# Patient Record
Sex: FEMALE | Race: WHITE | NOT HISPANIC OR LATINO | Employment: OTHER | URBAN - NONMETROPOLITAN AREA
[De-identification: names, ages, dates, MRNs, and addresses within clinical notes are randomized per-mention and may not be internally consistent; named-entity substitution may affect disease eponyms.]

---

## 2017-02-17 ENCOUNTER — TELEPHONE (OUTPATIENT)
Dept: ALLERGY | Facility: OTHER | Age: 52
End: 2017-02-17

## 2017-02-17 DIAGNOSIS — J30.89 PERENNIAL ALLERGIC RHINITIS: ICD-10-CM

## 2017-03-08 ENCOUNTER — TELEPHONE (OUTPATIENT)
Dept: ALLERGY | Facility: OTHER | Age: 52
End: 2017-03-08

## 2017-03-08 NOTE — TELEPHONE ENCOUNTER
Patient calls requesting refill of Sublingual immunotherapy drops. Patient is up to date with follow ups.  [facility] pending approval of [facility]. Drops will be sent to patients home via USPS.     Jasmyne Maldonado RN-BSN

## 2017-03-22 ENCOUNTER — OFFICE VISIT (OUTPATIENT)
Dept: FAMILY MEDICINE | Facility: OTHER | Age: 52
End: 2017-03-22
Attending: FAMILY MEDICINE
Payer: COMMERCIAL

## 2017-03-22 VITALS
DIASTOLIC BLOOD PRESSURE: 64 MMHG | TEMPERATURE: 97.7 F | BODY MASS INDEX: 25.16 KG/M2 | WEIGHT: 166 LBS | SYSTOLIC BLOOD PRESSURE: 96 MMHG | OXYGEN SATURATION: 100 % | HEIGHT: 68 IN | RESPIRATION RATE: 16 BRPM | HEART RATE: 67 BPM

## 2017-03-22 DIAGNOSIS — L72.9 SKIN CYST: ICD-10-CM

## 2017-03-22 DIAGNOSIS — J01.00 ACUTE MAXILLARY SINUSITIS, RECURRENCE NOT SPECIFIED: Primary | ICD-10-CM

## 2017-03-22 PROCEDURE — 99213 OFFICE O/P EST LOW 20 MIN: CPT | Performed by: FAMILY MEDICINE

## 2017-03-22 ASSESSMENT — PAIN SCALES - GENERAL: PAINLEVEL: SEVERE PAIN (6)

## 2017-03-22 NOTE — PROGRESS NOTES
Luly Freeman    2017    Chief Complaint   Patient presents with     Sinus Problem     Pt has left sinus pain and ear pain. Pt has had a cold since January. Pt does not have dental pain. Pt has tried a mouthgaurd without success. Pt has PND which causes ST.     Mass     Pt has a lump in her right calf for 10 weeks. The lump has increased in size. Lump is painful with touch.       SUBJECTIVE:  Here for ongoing sinus sx.  Having some facial pain.  Has had many issues with allergies and sinuses.  Sx since January.  Affecting left side of face.  Also, lump in right calf for aobut 2 months or more.  It has grown.  It is slightly tender.   No redness.  No excess swelling.  Has known varicosities and spider veins.      Past Medical History:   Diagnosis Date     Allergic rhinitis 2011     Lump or mass in breast 2007     Recurrent sinusitis 2011     Varicose veins 2011       Past Surgical History:   Procedure Laterality Date     BUNIONECTOMY RT/LT  2008     ENT SURGERY  10/2013    nose surgery, Dr. Elliott            laparoscopic supracervical hysterectomy      Fibroids, ovaries intact     Left Breast Bx  3/2007    Fibrocystic breast disease     PE tube placement       ventilation tube, left         Current Outpatient Prescriptions   Medication Sig Dispense Refill     amoxicillin-clavulanate (AUGMENTIN) 875-125 MG per tablet Take 1 tablet by mouth 2 times daily 20 tablet 0     SUBLINGUAL IMMUNOTHERAPY, SLIT, Continue SLIT, 1 drop 3 x day (SL), following standard maintenance protocol. 1 Bottle 3     fluticasone (FLONASE) 50 MCG/ACT nasal spray Spray 2 sprays into both nostrils daily       ALBUTEROL 108 (90 BASE) MCG/ACT inhaler INHALE 2 PUFFS BY MOUTH EVERY 6 HOURS AS NEEDED FOR SHORTNESS OF BREATH 8.5 g 0     EPINEPHrine (AUVI-Q) 0.3 MG/0.3ML injection Inject 0.3 mLs into the muscle once as needed for anaphylaxis for 1 dose. 2 each 2     fexofenadine (ALLEGRA) 180 MG  tablet Take 1 tablet by mouth every morning. 30 tablet 11       No Known Allergies    Family History   Problem Relation Age of Onset     C.A.D. Father      DIABETES No family hx of      CEREBROVASCULAR DISEASE No family hx of        Social History     Social History     Marital status:      Spouse name: N/A     Number of children: N/A     Years of education: N/A     Occupational History     cosmotologist      Full-time     Social History Main Topics     Smoking status: Never Smoker     Smokeless tobacco: Never Used      Comment: no passive exposure     Alcohol use Yes      Comment: rarely     Drug use: No     Sexual activity: Not on file     Other Topics Concern     Caffeine Concern Yes     coffee, 1 cup daily     Parent/Sibling W/ Cabg, Mi Or Angioplasty Before 65f 55m? No     Social History Narrative       5 point ROS negative except as noted above in HPI, including Gen., Resp., CV, GI &  system review.     OBJECTIVE:  B/P: 96/64, T: 97.7, P: 67, R: 16    GENERAL APPEARANCE: Alert, no acute distress  HEENT: probably some serous otitis, minimal.  Nothing infected.  Normal throat.    CV: regular rate and rhythm, no murmur, rub or gallop  RESP: lungs clear to auscultation bilaterally  ABDOMEN: normal bowel sounds, soft, nontender, no hepatosplenomegaly or other masses  SKIN: no suspicious lesions or rashes to visualized skin.  Probable cyst in the skin mid calf on the right.  Some spider veins around the area.    NEURO: Alert, oriented x 3, speech and mentation normal    ASSESSMENT and PLAN:  (J01.00) Acute maxillary sinusitis, recurrence not specified  (primary encounter diagnosis)  Comment: discussed.    Plan: amoxicillin-clavulanate (AUGMENTIN) 875-125 MG         per tablet        I think this is likely but I am not sure and she knows it.  It is worth a try.  If the abx fail she is going to f/u with Dr. Matos, whom she has seen many times for similar.     (L72.9) Skin cyst  Comment: right calf  area  Plan: GENERAL SURG ADULT REFERRAL        Discussed.  She would like removed.  I am not 100% sure it's a cyst, so excision certainly appropriate.

## 2017-03-22 NOTE — MR AVS SNAPSHOT
After Visit Summary   3/22/2017    Luly Freeman    MRN: [id number]           Patient Information     Date Of Birth          1965        Visit Information        Provider Department      3/22/2017 11:15 AM Zackary Smyth MD [facility]        Today's Diagnoses     Acute maxillary sinusitis, recurrence not specified    -  1    Skin cyst          Care Instructions    F/u with ongoing concerns.         Follow-ups after your visit        Additional Services     GENERAL SURG ADULT REFERRAL       Your provider has referred you to: Dr. Jenkins    Please be aware that coverage of these services is subject to the terms and limitations of your health insurance plan.  Call member services at your health plan with any benefit or coverage questions.      Please bring the following with you to your appointment:    (1) Any X-Rays, CTs or MRIs which have been performed.  Contact the facility where they were done to arrange for  prior to your scheduled appointment.   (2) List of current medications   (3) This referral request   (4) Any documents/labs given to you for this referral                  Your next 10 appointments already scheduled     Apr 19, 2017  9:30 AM CDT   (Arrive by 9:15 AM)   New Visit with Erma Jenkins MD   [facility]    [address] [place] [address] [facility] [address]   [phone]              Who to contact     If you have questions or need follow up information about today's clinic visit or your schedule please contact [facility] directly at [phone].  Normal or non-critical lab and imaging results will be communicated to you by MyChart, letter or phone within 4 business days after the clinic has received the results. If you do not hear from us within 7 days, please contact the clinic through MyChart or phone. If you have a critical or abnormal lab result, we will notify you by phone as soon as possible.  Submit refill  "requests through Innovega or call your pharmacy and they will forward the refill request to us. Please allow 3 business days for your refill to be completed.          Additional Information About Your Visit        Satori Brandshart Information     In[employer]ovega gives you secure access to your electronic health record. If you see a primary care provider, you can also send messages to your care team and make appointments. If you have questions, please call your primary care clinic.  If you do not have a primary care provider, please call [phone] and they will assist you.        Care EveryWhere ID     This is your Care EveryWhere ID. This could be used by other organizations to access your [facility] medical records  [id number]        Your Vitals Were     Pulse Temperature Respirations Height Pulse Oximetry BMI (Body Mass Index)    67 97.7  F (36.5  C) (Tympanic) 16 5' 8\" (1.727 m) 100% 25.24 kg/m2       Blood Pressure from Last 3 Encounters:   03/22/17 96/64   11/02/16 112/74   10/19/16 96/62    Weight from Last 3 Encounters:   03/22/17 166 lb (75.3 kg)   11/02/16 160 lb (72.6 kg)   10/19/16 155 lb (70.3 kg)              We Performed the Following     GENERAL SURG ADULT REFERRAL          Today's Medication Changes          These changes are accurate as of: 3/22/17 11:49 AM.  If you have any questions, ask your nurse or doctor.               Start taking these medicines.        Dose/Directions    amoxicillin-clavulanate 875-125 MG per tablet   Commonly known as:  AUGMENTIN   Used for:  Acute maxillary sinusitis, recurrence not specified   Started by:  Zackary Smyth MD        Dose:  1 tablet   Take 1 tablet by mouth 2 times daily   Quantity:  20 tablet   Refills:  0            Where to get your medicines      These medications were sent to [id number] - [place] - [address] AT [facility] [address] [place] [address]     Phone:  [phone]     amoxicillin-clavulanate 875-125 MG per " tablet                Primary Care Provider Office Phone # Fax #    Tere Herrera [phone]       [facility] [place] [address]  [place] [address]        Thank you!     Thank you for choosing [facility]  for your care. Our goal is always to provide you with excellent care. Hearing back from our patients is one way we can continue to improve our services. Please take a few minutes to complete the written survey that you may receive in the mail after your visit with us. Thank you!             Your Updated Medication List - Protect others around you: Learn how to safely use, store and throw away your medicines at www.disposemymeds.org.          This list is accurate as of: 3/22/17 11:49 AM.  Always use your most recent med list.                   Brand Name Dispense Instructions for use    albuterol 108 (90 BASE) MCG/ACT Inhaler   Generic drug:  albuterol     8.5 g    INHALE 2 PUFFS BY MOUTH EVERY 6 HOURS AS NEEDED FOR SHORTNESS OF BREATH       amoxicillin-clavulanate 875-125 MG per tablet    AUGMENTIN    20 tablet    Take 1 tablet by mouth 2 times daily       EPINEPHrine 0.3 MG/0.3ML injection    AUVI-Q    2 each    Inject 0.3 mLs into the muscle once as needed for anaphylaxis for 1 dose.       fexofenadine 180 MG tablet    ALLEGRA    30 tablet    Take 1 tablet by mouth every morning.       fluticasone 50 MCG/ACT spray    FLONASE     Spray 2 sprays into both nostrils daily       SUBLINGUAL IMMUNOTHERAPY (SLIT)     1 Bottle    Continue SLIT, 1 drop 3 x day (SL), following standard maintenance protocol.

## 2017-03-22 NOTE — NURSING NOTE
"Chief Complaint   Patient presents with     Sinus Problem     Pt has left sinus pain and ear pain. Pt has had a cold since January. Pt does not have dental pain. Pt has tried a mouthgaurd without success. Pt has PND which causes ST.     Mass     Pt has a lump in her right calf for 10 weeks. The lump has increased in size. Lump is painful with touch.       Initial BP 96/64 (BP Location: Right arm, Patient Position: Chair, Cuff Size: Adult Regular)  Pulse 67  Temp 97.7  F (36.5  C) (Tympanic)  Resp 16  Ht 5' 8\" (1.727 m)  Wt 166 lb (75.3 kg)  SpO2 100%  BMI 25.24 kg/m2 Estimated body mass index is 25.24 kg/(m^2) as calculated from the following:    Height as of this encounter: 5' 8\" (1.727 m).    Weight as of this encounter: 166 lb (75.3 kg).  Medication Reconciliation: complete   Danielle Hatfield    "

## 2017-04-19 ENCOUNTER — OFFICE VISIT (OUTPATIENT)
Dept: SURGERY | Facility: OTHER | Age: 52
End: 2017-04-19
Attending: FAMILY MEDICINE
Payer: COMMERCIAL

## 2017-04-19 ENCOUNTER — TELEPHONE (OUTPATIENT)
Dept: FAMILY MEDICINE | Facility: OTHER | Age: 52
End: 2017-04-19

## 2017-04-19 VITALS
HEIGHT: 68 IN | SYSTOLIC BLOOD PRESSURE: 98 MMHG | TEMPERATURE: 97.5 F | DIASTOLIC BLOOD PRESSURE: 66 MMHG | HEART RATE: 80 BPM | BODY MASS INDEX: 24.25 KG/M2 | WEIGHT: 160 LBS | OXYGEN SATURATION: 100 %

## 2017-04-19 DIAGNOSIS — L72.9 SKIN CYST: ICD-10-CM

## 2017-04-19 PROCEDURE — 99244 OFF/OP CNSLTJ NEW/EST MOD 40: CPT | Performed by: SURGERY

## 2017-04-19 NOTE — MR AVS SNAPSHOT
After Visit Summary   4/19/2017    Luly Freeman    MRN: [id number]           Patient Information     Date Of Birth          1965        Visit Information        Provider Department      4/19/2017 9:30 AM Erma Jenkins MD [facility]        Today's Diagnoses     Skin cyst          Care Instructions    Thank you for allowing Dr. Jenkins and our surgical team to participate in your care.  If you have a scheduling or an appointment question please contact our Health Unit Coordinator, Margaret,  at her direct line [phone].   ALL nursing questions or concerns can be directed to your surgical nurse at: [phone] -Yamilet    You are scheduled for a:   Excision soft tissue lesion, right lower leg.  Your procedure date is:   Monday, May 22, 2017    We will update our paper work the morning of the procedure - an additional appointment in this office is not required.      Nothing to eat or drink after midnight the evening prior to excision.      HOW TO PREPARE-    You need to have a scheduled Pre-Op with your primary care physician 10 days of the procedure.      You need a friend or family member available to drive you home AND stay with you for 24 hours after you leave the hospital. You will not be allowed to drive yourself. IF you need to take a taxi or the bus you MUST have a responsible person to ride with you. YOUR PROCEDURE WILL BE CANCELLED IF YOU DO NOT HAVE A RESPONSIBLE ADULT TO DRIVE YOU HOME.       You CANNOT have anything to eat or drink after midnight the night before your surgery, ncluding water and coffee. Your stomach needs to be completely empty. Do NOT chew gum, suck on hard candy, or smoke. You can brush your teeth the morning of surgery.       You need to call our Surgery Education Nurses 1-2 weeks prior to your surgery date at  [phone] or toll free [phone]. Please have you medication and allergy lists ready.      Stop your aspirin or other  NSAIDs(Ibuprofen, Motrin, Aleve, Celebrex, Naproxen, etc...) 7 days before your surgery.      Hospital admitting will call you the day before your surgery with your arrival time. If you are scheduled on a Monday admitting will call you the Friday before.      Please call your primary care physician if you should become ill within 24 hours of scheduled surgery. (ex.vomiting, diarrhea, fever)          You will need to wash the night before AND the morning of you procedure with the supplied Hibiclens. Wash your Surgical area with your bare hands, apply friction and rinse. KEEP IT AWAY FROM YOUR EYES, EARS, NOSE AND MOUTH.             Follow-ups after your visit        Who to contact     If you have questions or need follow up information about today's clinic visit or your schedule please contact [facility] directly at [phone].  Normal or non-critical lab and imaging results will be communicated to you by Atticoushart, letter or phone within 4 business days after the clinic has received the results. If you do not hear from us within 7 days, please contact the clinic through Atticoushart or phone. If you have a critical or abnormal lab result, we will notify you by phone as soon as possible.  Submit refill requests through eTukTuk or call your pharmacy and they will forward the refill request to us. Please allow 3 business days for your refill to be completed.          Additional Information About Your Visit        eTukTuk Information     eTukTuk gives you secure access to your electronic health record. If you see a primary care provider, you can also send messages to your care team and make appointments. If you have questions, please call your primary care clinic.  If you do not have a primary care provider, please call [phone] and they will assist you.        Care EveryWhere ID     This is your Care EveryWhere ID. This could be used by other organizations to access your [facility]  "records  [id number]        Your Vitals Were     Pulse Temperature Height Pulse Oximetry BMI (Body Mass Index)       80 97.5  F (36.4  C) (Tympanic) 5' 8\" (1.727 m) 100% 24.33 kg/m2        Blood Pressure from Last 3 Encounters:   04/19/17 98/66   03/22/17 96/64   11/02/16 112/74    Weight from Last 3 Encounters:   04/19/17 160 lb (72.6 kg)   03/22/17 166 lb (75.3 kg)   11/02/16 160 lb (72.6 kg)              Today, you had the following     No orders found for display       Primary Care Provider Office Phone # Fax #    Tere Herrera [phone]       [facility] [place] [address] [facility] [address]  [place] [address]        Thank you!     Thank you for choosing [facility]  for your care. Our goal is always to provide you with excellent care. Hearing back from our patients is one way we can continue to improve our services. Please take a few minutes to complete the written survey that you may receive in the mail after your visit with us. Thank you!             Your Updated Medication List - Protect others around you: Learn how to safely use, store and throw away your medicines at www.disposemymeds.org.          This list is accurate as of: 4/19/17  9:52 AM.  Always use your most recent med list.                   Brand Name Dispense Instructions for use    albuterol 108 (90 BASE) MCG/ACT Inhaler   Generic drug:  albuterol     8.5 g    INHALE 2 PUFFS BY MOUTH EVERY 6 HOURS AS NEEDED FOR SHORTNESS OF BREATH       EPINEPHrine 0.3 MG/0.3ML injection    AUVI-Q    2 each    Inject 0.3 mLs into the muscle once as needed for anaphylaxis for 1 dose.       fexofenadine 180 MG tablet    ALLEGRA    30 tablet    Take 1 tablet by mouth every morning.       fluticasone 50 MCG/ACT spray    FLONASE     Spray 2 sprays into both nostrils daily       SUBLINGUAL IMMUNOTHERAPY (SLIT)     1 Bottle    Continue SLIT, 1 drop 3 x day (SL), following standard maintenance protocol.         "

## 2017-04-19 NOTE — NURSING NOTE
"Chief Complaint   Patient presents with     Consult     Lump right calf X10 weeks, increasing in size and painful to touch       Initial BP 98/66 (BP Location: Left arm, Cuff Size: Adult Regular)  Pulse 80  Temp 97.5  F (36.4  C) (Tympanic)  Ht 5' 8\" (1.727 m)  Wt 160 lb (72.6 kg)  SpO2 100%  BMI 24.33 kg/m2 Estimated body mass index is 24.33 kg/(m^2) as calculated from the following:    Height as of this encounter: 5' 8\" (1.727 m).    Weight as of this encounter: 160 lb (72.6 kg).  Medication Reconciliation: complete   Ara Souza LPN      "

## 2017-04-19 NOTE — PATIENT INSTRUCTIONS
Thank you for allowing Dr. Jenkins and our surgical team to participate in your care.  If you have a scheduling or an appointment question please contact our Health Unit Coordinator, Margaret,  at her direct line [phone].   ALL nursing questions or concerns can be directed to your surgical nurse at: [phone] -Yamilet    You are scheduled for a:   Excision soft tissue lesion, right lower leg.  Your procedure date is:   Monday, May 22, 2017    We will update our paper work the morning of the procedure - an additional appointment in this office is not required.      Nothing to eat or drink after midnight the evening prior to excision.      HOW TO PREPARE-    You need to have a scheduled Pre-Op with your primary care physician 10 days of the procedure.      You need a friend or family member available to drive you home AND stay with you for 24 hours after you leave the hospital. You will not be allowed to drive yourself. IF you need to take a taxi or the bus you MUST have a responsible person to ride with you. YOUR PROCEDURE WILL BE CANCELLED IF YOU DO NOT HAVE A RESPONSIBLE ADULT TO DRIVE YOU HOME.       You CANNOT have anything to eat or drink after midnight the night before your surgery, ncluding water and coffee. Your stomach needs to be completely empty. Do NOT chew gum, suck on hard candy, or smoke. You can brush your teeth the morning of surgery.       You need to call our Surgery Education Nurses 1-2 weeks prior to your surgery date at  [phone] or toll free [phone]. Please have you medication and allergy lists ready.      Stop your aspirin or other NSAIDs(Ibuprofen, Motrin, Aleve, Celebrex, Naproxen, etc...) 7 days before your surgery.      Hospital admitting will call you the day before your surgery with your arrival time. If you are scheduled on a Monday admitting will call you the Friday before.      Please call your primary care physician if you should become ill within 24 hours of scheduled  surgery. (ex.vomiting, diarrhea, fever)          You will need to wash the night before AND the morning of you procedure with the supplied Hibiclens. Wash your Surgical area with your bare hands, apply friction and rinse. KEEP IT AWAY FROM YOUR EYES, EARS, NOSE AND MOUTH.

## 2017-04-19 NOTE — TELEPHONE ENCOUNTER
Pt calling stating that she has a pre op on 05/15/17 and wondering if she could also have her physical at the same time including her pap and breast exam. Please call the pt to let her know if this is possible and ok to leave a message on phone.

## 2017-04-19 NOTE — PROGRESS NOTES
"[facility] Consultation    CC:  Soft tissue mass, right lower leg    HPI:  This 51 year old year old female is seen at the request of Dr. Smyth and ultimately Dr. Herrera for evaluation of an enlarging soft tissue lesion, right lower leg.  The patient noted a \"pea sized\" lesion under the skin on her right lower leg while shaving.  Her massage therapist monitored and she had it examined by Dr. Smyth while being evaluated for a sinus infection.  Definitive excision requested as this has enlarged to now 2.5 - 3.0 cm diameter.  She has superficial spider varicosities.  Excision will require main operating room.  The process was first noted 10 weeks ago with the enlargement described.    Past Medical History:   Diagnosis Date     Allergic rhinitis 2011     Lump or mass in breast 2007     Recurrent sinusitis 2011     Varicose veins 2011     Past Surgical History:   Procedure Laterality Date     BUNIONECTOMY RT/LT  2008     ENT SURGERY  10/2013    nose surgery, Dr. Elliott            laparoscopic supracervical hysterectomy      Fibroids, ovaries intact     Left Breast Bx  3/2007    Fibrocystic breast disease     PE tube placement       ventilation tube, left       Current Outpatient Prescriptions   Medication     SUBLINGUAL IMMUNOTHERAPY, SLIT,     fluticasone (FLONASE) 50 MCG/ACT nasal spray     ALBUTEROL 108 (90 BASE) MCG/ACT inhaler     EPINEPHrine (AUVI-Q) 0.3 MG/0.3ML injection     fexofenadine (ALLEGRA) 180 MG tablet     No current facility-administered medications for this visit.      Allergies   Allergen Reactions     Seasonal Allergies      HABITS:    Social History   Substance Use Topics     Smoking status: Never Smoker     Smokeless tobacco: Never Used      Comment: no passive exposure     Alcohol use Yes      Comment: rarely       Family History   Problem Relation Age of Onset     C.A.D. Father      DIABETES No family hx of      CEREBROVASCULAR " "DISEASE No family hx of        REVIEW OF SYSTEMS:  Ten point review of systems negative except those mentioned in the HPI.     OBJECTIVE:    BP 98/66 (BP Location: Left arm, Cuff Size: Adult Regular)  Pulse 80  Temp 97.5  F (36.4  C) (Tympanic)  Ht 5' 8\" (1.727 m)  Wt 160 lb (72.6 kg)  SpO2 100%  BMI 24.33 kg/m2    GENERAL: Generally appears well, in no distress with appropriate affect.  HEENT:   Sclerae anicteric - No cervical, supra/infraclavciular lymphadenopathy, no thyroid masses  Respiratory:  Lungs clear to ausculation bilaterally with good air excursion  Cardiovascular:  Regular Rate and Rhythm with no murmurs gallops or rubs, normal   Abdomen:  :  deferred  Extremities:  Right lower leg - mid portion laterally, subcutaneous ill defined, infiltrative fibrotic, nontender lesion of uncertain etiology.  2.5 x 3.0 cm and may extend to fascia.  Skin:  no suspicious lesions or rashes; spider veins scattered about lower extremity superficially.  Neurological: grossly intact    Psych:  Alert, oriented, affect appropriate with normal decision making ability.    IMPRESSION:  Soft tissue lesion right lower leg, uncertain etiology with spider varicosities prohibiting safe office procedure (as well as firm, infiltrative, uncertain underlying process) requiring operative excision.    PLAN:  Scheduled, main operating room, local sedation.  Medical decision making exhaustively reviewed as well as approach, cosmetic and recuperative expectations.  She is entirely in agreement with plan.  Preoperative evaluation to be scheduled.    Erma Jenkins MD, FACS    4/19/2017  9:42 AM    cc:  Dr. Herrera  "

## 2017-05-04 NOTE — TELEPHONE ENCOUNTER
Called and left message to schedule an appt for a physical separate. Unable to do the preop and physical together

## 2017-05-04 NOTE — TELEPHONE ENCOUNTER
We have to do 2 separate computer templates each addressing different issues one for surgery and one for health care for this so would need to make 2 appointments

## 2017-05-15 ENCOUNTER — OFFICE VISIT (OUTPATIENT)
Dept: FAMILY MEDICINE | Facility: OTHER | Age: 52
End: 2017-05-15
Attending: FAMILY MEDICINE
Payer: COMMERCIAL

## 2017-05-15 VITALS
OXYGEN SATURATION: 100 % | TEMPERATURE: 97.5 F | HEART RATE: 77 BPM | BODY MASS INDEX: 24.33 KG/M2 | DIASTOLIC BLOOD PRESSURE: 70 MMHG | WEIGHT: 160 LBS | SYSTOLIC BLOOD PRESSURE: 102 MMHG

## 2017-05-15 DIAGNOSIS — Z01.818 PREOP GENERAL PHYSICAL EXAM: Primary | ICD-10-CM

## 2017-05-15 DIAGNOSIS — Z12.31 VISIT FOR SCREENING MAMMOGRAM: Primary | ICD-10-CM

## 2017-05-15 LAB
ERYTHROCYTE [DISTWIDTH] IN BLOOD BY AUTOMATED COUNT: 12.2 % (ref 10–15)
HCT VFR BLD AUTO: 38 % (ref 35–47)
HGB BLD-MCNC: 12.9 G/DL (ref 11.7–15.7)
MCH RBC QN AUTO: 31.3 PG (ref 26.5–33)
MCHC RBC AUTO-ENTMCNC: 33.9 G/DL (ref 31.5–36.5)
MCV RBC AUTO: 92 FL (ref 78–100)
PLATELET # BLD AUTO: 225 10E9/L (ref 150–450)
RBC # BLD AUTO: 4.12 10E12/L (ref 3.8–5.2)
WBC # BLD AUTO: 8.6 10E9/L (ref 4–11)

## 2017-05-15 PROCEDURE — 85027 COMPLETE CBC AUTOMATED: CPT | Performed by: FAMILY MEDICINE

## 2017-05-15 PROCEDURE — 99214 OFFICE O/P EST MOD 30 MIN: CPT | Performed by: FAMILY MEDICINE

## 2017-05-15 PROCEDURE — 36415 COLL VENOUS BLD VENIPUNCTURE: CPT | Performed by: FAMILY MEDICINE

## 2017-05-15 NOTE — MR AVS SNAPSHOT
After Visit Summary   5/15/2017    Luly Freeman    MRN: [id number]           Patient Information     Date Of Birth          1965        Visit Information        Provider Department      5/15/2017 11:00 AM Tere Herrera MD [facility]        Today's Diagnoses     Preop general physical exam    -  1      Care Instructions      Before Your Surgery      Call your surgeon if there is any change in your health. This includes signs of a cold or flu (such as a sore throat, runny nose, cough, rash or fever).    Do not smoke, drink alcohol or take over the counter medicine (unless your surgeon or primary care doctor tells you to) for the 24 hours before and after surgery.    If you take prescribed drugs: Follow your doctor s orders about which medicines to take and which to stop until after surgery.    Eating and drinking prior to surgery: follow the instructions from your surgeon    Take a shower or bath the night before surgery. Use the soap your surgeon gave you to gently clean your skin. If you do not have soap from your surgeon, use your regular soap. Do not shave or scrub the surgery site.  Wear clean pajamas and have clean sheets on your bed.     Hold all medications the morning of surgery    Nothing to eat or drink after midnight the night prior to surgery        Follow-ups after your visit        Your next 10 appointments already scheduled     May 22, 2017   Procedure with Erma Jenkins MD   [facility] ([address] [id number] [phone]              Who to contact     If you have questions or need follow up information about today's clinic visit or your schedule please contact [facility] directly at [phone].  Normal or non-critical lab and imaging results will be communicated to you by MyChart, letter or phone within 4 business days after the clinic has received the results. If you do not hear from us  within 7 days, please contact the clinic through [employer] or phone. If you have a critical or abnormal lab result, we will notify you by phone as soon as possible.  Submit refill requests through [employer] or call your pharmacy and they will forward the refill request to us. Please allow 3 business days for your refill to be completed.          Additional Information About Your Visit        [employer] Information     [employer] gives you secure access to your electronic health record. If you see a primary care provider, you can also send messages to your care team and make appointments. If you have questions, please call your primary care clinic.  If you do not have a primary care provider, please call [phone] and they will assist you.        Care EveryWhere ID     This is your Care EveryWhere ID. This could be used by other organizations to access your [facility] medical records  [id number]        Your Vitals Were     Pulse Temperature Pulse Oximetry BMI (Body Mass Index)          77 97.5  F (36.4  C) (Tympanic) 100% 24.33 kg/m2         Blood Pressure from Last 3 Encounters:   05/15/17 102/70   04/19/17 98/66   03/22/17 96/64    Weight from Last 3 Encounters:   05/15/17 160 lb (72.6 kg)   04/19/17 160 lb (72.6 kg)   03/22/17 166 lb (75.3 kg)              Today, you had the following     No orders found for display       Primary Care Provider Office Phone # Fax #    Tere Herrera [phone]       [facility] [place] [address] [place] [id number]        Thank you!     Thank you for choosing [facility]  for your care. Our goal is always to provide you with excellent care. Hearing back from our patients is one way we can continue to improve our services. Please take a few minutes to complete the written survey that you may receive in the mail after your visit with us. Thank you!             Your Updated Medication List - Protect others around you: Learn how to safely use, store and  throw away your medicines at www.disposemymeds.org.          This list is accurate as of: 5/15/17 11:28 AM.  Always use your most recent med list.                   Brand Name Dispense Instructions for use    albuterol 108 (90 BASE) MCG/ACT Inhaler   Generic drug:  albuterol     8.5 g    INHALE 2 PUFFS BY MOUTH EVERY 6 HOURS AS NEEDED FOR SHORTNESS OF BREATH       EPINEPHrine 0.3 MG/0.3ML injection    AUVI-Q    2 each    Inject 0.3 mLs into the muscle once as needed for anaphylaxis for 1 dose.       fexofenadine 180 MG tablet    ALLEGRA    30 tablet    Take 1 tablet by mouth every morning.       fluticasone 50 MCG/ACT spray    FLONASE     Spray 2 sprays into both nostrils daily       SUBLINGUAL IMMUNOTHERAPY (SLIT)     1 Bottle    Continue SLIT, 1 drop 3 x day (SL), following standard maintenance protocol.

## 2017-05-15 NOTE — PATIENT INSTRUCTIONS
Before Your Surgery      Call your surgeon if there is any change in your health. This includes signs of a cold or flu (such as a sore throat, runny nose, cough, rash or fever).    Do not smoke, drink alcohol or take over the counter medicine (unless your surgeon or primary care doctor tells you to) for the 24 hours before and after surgery.    If you take prescribed drugs: Follow your doctor s orders about which medicines to take and which to stop until after surgery.    Eating and drinking prior to surgery: follow the instructions from your surgeon    Take a shower or bath the night before surgery. Use the soap your surgeon gave you to gently clean your skin. If you do not have soap from your surgeon, use your regular soap. Do not shave or scrub the surgery site.  Wear clean pajamas and have clean sheets on your bed.     Hold all medications the morning of surgery    Nothing to eat or drink after midnight the night prior to surgery

## 2017-05-15 NOTE — PROGRESS NOTES
[facility] [place]  [address]  [place] [address]  [phone]  Dept: [phone]    PRE-OP EVALUATION:  Today's date: 5/15/2017    Luly Freeman (: 1965) presents for pre-operative evaluation assessment as requested by Dr. Jenkins.  She requires evaluation and anesthesia risk assessment prior to undergoing surgery/procedure for treatment of mass LE .  Proposed procedure: excise lower mass Rt leg    Date of Surgery/ Procedure: 17  Time of Surgery/ Procedure: [facility]  [facility]  Primary Physician: Tere Herrera  Type of Anesthesia Anticipated: to be determined    Patient has a Health Care Directive or Living Will:  NO    Preop Questions 2017   1.  Do you have a history of heart attack, stroke, stent, bypass or surgery on an artery in the head, neck, heart or legs? No   2.  Do you ever have any pain or discomfort in your chest? No   3.  Do you have a history of  Heart Failure? No   4.   Are you troubled by shortness of breath when:  walking on a level surface, or up a slight hill, or at night? No   5.  Do you currently have a cold, bronchitis or other respiratory infection? No   6.  Do you have a cough, shortness of breath, or wheezing? No   7.  Do you sometimes get pains in the calves of your legs when you walk? No   8. Do you or anyone in your family have previous history of blood clots? No   9.  Do you or does anyone in your family have a serious bleeding problem such as prolonged bleeding following surgeries or cuts? No   10. Have you ever had problems with anemia or been told to take iron pills? No   11. Have you had any abnormal blood loss such as black, tarry or bloody stools, or abnormal vaginal bleeding? No   12. Have you ever had a blood transfusion? No   13. Have you or any of your relatives ever had problems with anesthesia? No   14. Do you have sleep apnea, excessive snoring or daytime drowsiness? No   15. Do you have any prosthetic heart valves? No    16. Do you have prosthetic joints? No   17. Is there any chance that you may be pregnant? No         HPI:                                                      Brief HPI related to upcoming procedure: mass of right posterior calf          MEDICAL HISTORY:                                                      Patient Active Problem List    Diagnosis Date Noted     ACP (advance care planning) 2016     Priority: Medium     Advance Care Planning 2016: ACP Review of Chart / Resources Provided:  Reviewed chart for advance care plan.  Luly Freeman has no plan or code status on file. Discussed available resources and provided with information. Confirmed code status reflects current choices pending further ACP discussions.  Confirmed/documented legally designated decision makers.  Added by Judith Richey             Multiple respiratory allergies 2013     Priority: Medium      Past Medical History:   Diagnosis Date     Allergic rhinitis 2011     Lump or mass in breast 2007     Recurrent sinusitis 2011     Varicose veins 2011     Past Surgical History:   Procedure Laterality Date     BUNIONECTOMY RT/LT  2008     ENT SURGERY  10/2013    nose surgery, Dr. Elliott            laparoscopic supracervical hysterectomy      Fibroids, ovaries intact     Left Breast Bx  3/2007    Fibrocystic breast disease     PE tube placement       ventilation tube, left       Current Outpatient Prescriptions   Medication Sig Dispense Refill     SUBLINGUAL IMMUNOTHERAPY, SLIT, Continue SLIT, 1 drop 3 x day (SL), following standard maintenance protocol. 1 Bottle 3     fluticasone (FLONASE) 50 MCG/ACT nasal spray Spray 2 sprays into both nostrils daily       ALBUTEROL 108 (90 BASE) MCG/ACT inhaler INHALE 2 PUFFS BY MOUTH EVERY 6 HOURS AS NEEDED FOR SHORTNESS OF BREATH 8.5 g 0     EPINEPHrine (AUVI-Q) 0.3 MG/0.3ML injection Inject 0.3 mLs into the muscle once as needed for anaphylaxis for 1  dose. 2 each 2     fexofenadine (ALLEGRA) 180 MG tablet Take 1 tablet by mouth every morning. 30 tablet 11     OTC products: None, except as noted above    Allergies   Allergen Reactions     Seasonal Allergies       Latex Allergy: NO    Social History   Substance Use Topics     Smoking status: Never Smoker     Smokeless tobacco: Never Used      Comment: no passive exposure     Alcohol use Yes      Comment: rarely     History   Drug Use No       REVIEW OF SYSTEMS:                                                    C: NEGATIVE for fever, chills, change in weight  I: NEGATIVE for worrisome rashes, moles or lesions  E: NEGATIVE for vision changes or irritation  E/M: NEGATIVE for ear, mouth and throat problems  R: NEGATIVE for significant cough or SOB  CV: NEGATIVE for chest pain, palpitations or peripheral edema  GI: NEGATIVE for nausea, abdominal pain, heartburn, or change in bowel habits  : NEGATIVE for frequency, dysuria, or hematuria  M: NEGATIVE for significant arthralgias or myalgia  N: NEGATIVE for weakness, dizziness or paresthesias  E: NEGATIVE for temperature intolerance, skin/hair changes  H: NEGATIVE for bleeding problems  P: NEGATIVE for changes in mood or affect    EXAM:                                                    /70 (BP Location: Left leg, Patient Position: Chair)  Pulse 77  Temp 97.5  F (36.4  C) (Tympanic)  Wt 160 lb (72.6 kg)  SpO2 100%  BMI 24.33 kg/m2    GENERAL APPEARANCE: healthy, alert and no distress     EYES: EOMI, PERRL     HENT: ear canals and TM's normal and nose and mouth without ulcers or lesions     NECK: no adenopathy, no asymmetry, masses, or scars and thyroid normal to palpation     RESP: lungs clear to auscultation - no rales, rhonchi or wheezes     CV: regular rates and rhythm, normal S1 S2, no S3 or S4 and no murmur, click or rub     ABDOMEN:  soft, nontender, no HSM or masses and bowel sounds normal     MS: extremities normal- no gross deformities noted, no  evidence of inflammation in joints, FROM in all extremities.     SKIN: no suspicious lesions or rashes     NEURO: Normal strength and tone, sensory exam grossly normal, mentation intact and speech normal     PSYCH: mentation appears normal. and affect normal/bright     LYMPHATICS: No axillary, cervical, or supraclavicular nodes    DIAGNOSTICS:                                                    EKG: Not indicated due to non-vascular surgery and low risk of event (age <65 and without cardiac risk factors)    Results for orders placed or performed in visit on 05/15/17   CBC with platelets   Result Value Ref Range    WBC 8.6 4.0 - 11.0 10e9/L    RBC Count 4.12 3.8 - 5.2 10e12/L    Hemoglobin 12.9 11.7 - 15.7 g/dL    Hematocrit 38.0 35.0 - 47.0 %    MCV 92 78 - 100 fl    MCH 31.3 26.5 - 33.0 pg    MCHC 33.9 31.5 - 36.5 g/dL    RDW 12.2 10.0 - 15.0 %    Platelet Count 225 150 - 450 10e9/L         IMPRESSION:                                                    Reason for surgery/procedure: mass of right posterior calf    The proposed surgical procedure is considered LOW risk.    REVISED CARDIAC RISK INDEX  The patient has the following serious cardiovascular risks for perioperative complications such as (MI, PE, VFib and 3  AV Block):  No serious cardiac risks  INTERPRETATION: 0 risks: Class I (very low risk - 0.4% complication rate)    The patient has the following additional risks for perioperative complications:  No identified additional risks    No diagnosis found.    RECOMMENDATIONS:                                                          Hold all medications the morning of surgery    Nothing to eat or drink after midnight the night prior to surgery      APPROVAL GIVEN to proceed with proposed procedure, without further diagnostic evaluation       Signed Electronically by: Tere Herrera MD    Copy of this evaluation report is provided to requesting physician.    [place] Preop Guidelines

## 2017-05-15 NOTE — NURSING NOTE
"Chief Complaint   Patient presents with     Pre-Op Exam     5/22/17 Jenkins Mass excision       Initial /70 (BP Location: Left leg, Patient Position: Chair)  Pulse 77  Temp 97.5  F (36.4  C) (Tympanic)  Wt 160 lb (72.6 kg)  SpO2 100%  BMI 24.33 kg/m2 Estimated body mass index is 24.33 kg/(m^2) as calculated from the following:    Height as of 4/19/17: 5' 8\" (1.727 m).    Weight as of this encounter: 160 lb (72.6 kg).  Medication Reconciliation: complete     Scarlet Clayton        "

## 2017-05-16 NOTE — H&P (VIEW-ONLY)
[facility] [place]  [address]  [place] [address]  [phone]  Dept: [phone]    PRE-OP EVALUATION:  Today's date: 5/15/2017    Luly Freeman (: 1965) presents for pre-operative evaluation assessment as requested by Dr. Jenkins.  She requires evaluation and anesthesia risk assessment prior to undergoing surgery/procedure for treatment of mass LE .  Proposed procedure: excise lower mass Rt leg    Date of Surgery/ Procedure: 17  Time of Surgery/ Procedure: [facility]  [place] [facility]  Primary Physician: Tere Herrera  Type of Anesthesia Anticipated: to be determined    Patient has a Health Care Directive or Living Will:  NO    Preop Questions 2017   1.  Do you have a history of heart attack, stroke, stent, bypass or surgery on an artery in the head, neck, heart or legs? No   2.  Do you ever have any pain or discomfort in your chest? No   3.  Do you have a history of  Heart Failure? No   4.   Are you troubled by shortness of breath when:  walking on a level surface, or up a slight hill, or at night? No   5.  Do you currently have a cold, bronchitis or other respiratory infection? No   6.  Do you have a cough, shortness of breath, or wheezing? No   7.  Do you sometimes get pains in the calves of your legs when you walk? No   8. Do you or anyone in your family have previous history of blood clots? No   9.  Do you or does anyone in your family have a serious bleeding problem such as prolonged bleeding following surgeries or cuts? No   10. Have you ever had problems with anemia or been told to take iron pills? No   11. Have you had any abnormal blood loss such as black, tarry or bloody stools, or abnormal vaginal bleeding? No   12. Have you ever had a blood transfusion? No   13. Have you or any of your relatives ever had problems with anesthesia? No   14. Do you have sleep apnea, excessive snoring or daytime drowsiness? No   15. Do you have any prosthetic heart valves? No    16. Do you have prosthetic joints? No   17. Is there any chance that you may be pregnant? No         HPI:                                                      Brief HPI related to upcoming procedure: mass of right posterior calf          MEDICAL HISTORY:                                                      Patient Active Problem List    Diagnosis Date Noted     ACP (advance care planning) 2016     Priority: Medium     Advance Care Planning 2016: ACP Review of Chart / Resources Provided:  Reviewed chart for advance care plan.  Luly Freeman has no plan or code status on file. Discussed available resources and provided with information. Confirmed code status reflects current choices pending further ACP discussions.  Confirmed/documented legally designated decision makers.  Added by Judith Richey             Multiple respiratory allergies 2013     Priority: Medium      Past Medical History:   Diagnosis Date     Allergic rhinitis 2011     Lump or mass in breast 2007     Recurrent sinusitis 2011     Varicose veins 2011     Past Surgical History:   Procedure Laterality Date     BUNIONECTOMY RT/LT  2008     ENT SURGERY  10/2013    nose surgery, Dr. Elliott            laparoscopic supracervical hysterectomy      Fibroids, ovaries intact     Left Breast Bx  3/2007    Fibrocystic breast disease     PE tube placement       ventilation tube, left       Current Outpatient Prescriptions   Medication Sig Dispense Refill     SUBLINGUAL IMMUNOTHERAPY, SLIT, Continue SLIT, 1 drop 3 x day (SL), following standard maintenance protocol. 1 Bottle 3     fluticasone (FLONASE) 50 MCG/ACT nasal spray Spray 2 sprays into both nostrils daily       ALBUTEROL 108 (90 BASE) MCG/ACT inhaler INHALE 2 PUFFS BY MOUTH EVERY 6 HOURS AS NEEDED FOR SHORTNESS OF BREATH 8.5 g 0     EPINEPHrine (AUVI-Q) 0.3 MG/0.3ML injection Inject 0.3 mLs into the muscle once as needed for anaphylaxis for 1  dose. 2 each 2     fexofenadine (ALLEGRA) 180 MG tablet Take 1 tablet by mouth every morning. 30 tablet 11     OTC products: None, except as noted above    Allergies   Allergen Reactions     Seasonal Allergies       Latex Allergy: NO    Social History   Substance Use Topics     Smoking status: Never Smoker     Smokeless tobacco: Never Used      Comment: no passive exposure     Alcohol use Yes      Comment: rarely     History   Drug Use No       REVIEW OF SYSTEMS:                                                    C: NEGATIVE for fever, chills, change in weight  I: NEGATIVE for worrisome rashes, moles or lesions  E: NEGATIVE for vision changes or irritation  E/M: NEGATIVE for ear, mouth and throat problems  R: NEGATIVE for significant cough or SOB  CV: NEGATIVE for chest pain, palpitations or peripheral edema  GI: NEGATIVE for nausea, abdominal pain, heartburn, or change in bowel habits  : NEGATIVE for frequency, dysuria, or hematuria  M: NEGATIVE for significant arthralgias or myalgia  N: NEGATIVE for weakness, dizziness or paresthesias  E: NEGATIVE for temperature intolerance, skin/hair changes  H: NEGATIVE for bleeding problems  P: NEGATIVE for changes in mood or affect    EXAM:                                                    /70 (BP Location: Left leg, Patient Position: Chair)  Pulse 77  Temp 97.5  F (36.4  C) (Tympanic)  Wt 160 lb (72.6 kg)  SpO2 100%  BMI 24.33 kg/m2    GENERAL APPEARANCE: healthy, alert and no distress     EYES: EOMI, PERRL     HENT: ear canals and TM's normal and nose and mouth without ulcers or lesions     NECK: no adenopathy, no asymmetry, masses, or scars and thyroid normal to palpation     RESP: lungs clear to auscultation - no rales, rhonchi or wheezes     CV: regular rates and rhythm, normal S1 S2, no S3 or S4 and no murmur, click or rub     ABDOMEN:  soft, nontender, no HSM or masses and bowel sounds normal     MS: extremities normal- no gross deformities noted, no  evidence of inflammation in joints, FROM in all extremities.     SKIN: no suspicious lesions or rashes     NEURO: Normal strength and tone, sensory exam grossly normal, mentation intact and speech normal     PSYCH: mentation appears normal. and affect normal/bright     LYMPHATICS: No axillary, cervical, or supraclavicular nodes    DIAGNOSTICS:                                                    EKG: Not indicated due to non-vascular surgery and low risk of event (age <65 and without cardiac risk factors)    Results for orders placed or performed in visit on 05/15/17   CBC with platelets   Result Value Ref Range    WBC 8.6 4.0 - 11.0 10e9/L    RBC Count 4.12 3.8 - 5.2 10e12/L    Hemoglobin 12.9 11.7 - 15.7 g/dL    Hematocrit 38.0 35.0 - 47.0 %    MCV 92 78 - 100 fl    MCH 31.3 26.5 - 33.0 pg    MCHC 33.9 31.5 - 36.5 g/dL    RDW 12.2 10.0 - 15.0 %    Platelet Count 225 150 - 450 10e9/L         IMPRESSION:                                                    Reason for surgery/procedure: mass of right posterior calf    The proposed surgical procedure is considered LOW risk.    REVISED CARDIAC RISK INDEX  The patient has the following serious cardiovascular risks for perioperative complications such as (MI, PE, VFib and 3  AV Block):  No serious cardiac risks  INTERPRETATION: 0 risks: Class I (very low risk - 0.4% complication rate)    The patient has the following additional risks for perioperative complications:  No identified additional risks    No diagnosis found.    RECOMMENDATIONS:                                                          Hold all medications the morning of surgery    Nothing to eat or drink after midnight the night prior to surgery      APPROVAL GIVEN to proceed with proposed procedure, without further diagnostic evaluation       Signed Electronically by: Tere Herrera MD    Copy of this evaluation report is provided to requesting physician.    [place] Preop Guidelines

## 2017-05-22 ENCOUNTER — HOSPITAL ENCOUNTER (OUTPATIENT)
Facility: HOSPITAL | Age: 52
Discharge: HOME | End: 2017-05-22
Attending: SURGERY | Admitting: SURGERY
Payer: COMMERCIAL

## 2017-05-22 ENCOUNTER — ANESTHESIA (OUTPATIENT)
Dept: SURGERY | Facility: HOSPITAL | Age: 52
End: 2017-05-22
Payer: COMMERCIAL

## 2017-05-22 ENCOUNTER — ANESTHESIA EVENT (OUTPATIENT)
Dept: SURGERY | Facility: HOSPITAL | Age: 52
End: 2017-05-22
Payer: COMMERCIAL

## 2017-05-22 VITALS
OXYGEN SATURATION: 99 % | SYSTOLIC BLOOD PRESSURE: 112 MMHG | HEIGHT: 68 IN | RESPIRATION RATE: 18 BRPM | DIASTOLIC BLOOD PRESSURE: 74 MMHG | BODY MASS INDEX: 24.44 KG/M2 | WEIGHT: 161.3 LBS | TEMPERATURE: 97.4 F

## 2017-05-22 DIAGNOSIS — M79.89 SOFT TISSUE MASS: Primary | ICD-10-CM

## 2017-05-22 PROCEDURE — 71000027 ZZH RECOVERY PHASE 2 EACH 15 MINS: Performed by: SURGERY

## 2017-05-22 PROCEDURE — 37000009 ZZH ANESTHESIA TECHNICAL FEE, EACH ADDTL 15 MIN: Performed by: SURGERY

## 2017-05-22 PROCEDURE — 25000128 H RX IP 250 OP 636: Performed by: NURSE ANESTHETIST, CERTIFIED REGISTERED

## 2017-05-22 PROCEDURE — 27632 EXC LEG/ANKLE LES SC 3 CM/>: CPT | Mod: RT | Performed by: SURGERY

## 2017-05-22 PROCEDURE — 36000050 ZZH SURGERY LEVEL 2 1ST 30 MIN: Performed by: SURGERY

## 2017-05-22 PROCEDURE — 25000128 H RX IP 250 OP 636: Performed by: ANESTHESIOLOGY

## 2017-05-22 PROCEDURE — 27210995 ZZH RX 272: Performed by: SURGERY

## 2017-05-22 PROCEDURE — 27618 EXC LEG/ANKLE TUM < 3 CM: CPT | Performed by: ANESTHESIOLOGY

## 2017-05-22 PROCEDURE — 25000128 H RX IP 250 OP 636: Performed by: SURGERY

## 2017-05-22 PROCEDURE — 01999 UNLISTED ANES PROCEDURE: CPT | Performed by: NURSE ANESTHETIST, CERTIFIED REGISTERED

## 2017-05-22 PROCEDURE — 25000125 ZZHC RX 250: Performed by: NURSE ANESTHETIST, CERTIFIED REGISTERED

## 2017-05-22 PROCEDURE — 37000008 ZZH ANESTHESIA TECHNICAL FEE, 1ST 30 MIN: Performed by: SURGERY

## 2017-05-22 PROCEDURE — 40000305 ZZH STATISTIC PRE PROC ASSESS I: Performed by: SURGERY

## 2017-05-22 PROCEDURE — 88304 TISSUE EXAM BY PATHOLOGIST: CPT | Mod: TC | Performed by: SURGERY

## 2017-05-22 PROCEDURE — 27110028 ZZH OR GENERAL SUPPLY NON-STERILE: Performed by: SURGERY

## 2017-05-22 PROCEDURE — 36000052 ZZH SURGERY LEVEL 2 EA 15 ADDTL MIN: Performed by: SURGERY

## 2017-05-22 PROCEDURE — 25000125 ZZHC RX 250: Performed by: ANESTHESIOLOGY

## 2017-05-22 RX ORDER — SODIUM CHLORIDE, SODIUM LACTATE, POTASSIUM CHLORIDE, CALCIUM CHLORIDE 600; 310; 30; 20 MG/100ML; MG/100ML; MG/100ML; MG/100ML
INJECTION, SOLUTION INTRAVENOUS CONTINUOUS
Status: DISCONTINUED | OUTPATIENT
Start: 2017-05-22 | End: 2017-05-22 | Stop reason: HOSPADM

## 2017-05-22 RX ORDER — KETOROLAC TROMETHAMINE 30 MG/ML
30 INJECTION, SOLUTION INTRAMUSCULAR; INTRAVENOUS EVERY 6 HOURS PRN
Status: DISCONTINUED | OUTPATIENT
Start: 2017-05-22 | End: 2017-05-22 | Stop reason: HOSPADM

## 2017-05-22 RX ORDER — LIDOCAINE HYDROCHLORIDE 20 MG/ML
INJECTION, SOLUTION INFILTRATION; PERINEURAL PRN
Status: DISCONTINUED | OUTPATIENT
Start: 2017-05-22 | End: 2017-05-22

## 2017-05-22 RX ORDER — FENTANYL CITRATE 50 UG/ML
INJECTION, SOLUTION INTRAMUSCULAR; INTRAVENOUS PRN
Status: DISCONTINUED | OUTPATIENT
Start: 2017-05-22 | End: 2017-05-22

## 2017-05-22 RX ORDER — HYDRALAZINE HYDROCHLORIDE 20 MG/ML
2.5-5 INJECTION INTRAMUSCULAR; INTRAVENOUS EVERY 10 MIN PRN
Status: DISCONTINUED | OUTPATIENT
Start: 2017-05-22 | End: 2017-05-22 | Stop reason: HOSPADM

## 2017-05-22 RX ORDER — LABETALOL HYDROCHLORIDE 5 MG/ML
10 INJECTION, SOLUTION INTRAVENOUS
Status: DISCONTINUED | OUTPATIENT
Start: 2017-05-22 | End: 2017-05-22 | Stop reason: HOSPADM

## 2017-05-22 RX ORDER — MEPERIDINE HYDROCHLORIDE 25 MG/ML
12.5 INJECTION INTRAMUSCULAR; INTRAVENOUS; SUBCUTANEOUS
Status: DISCONTINUED | OUTPATIENT
Start: 2017-05-22 | End: 2017-05-22 | Stop reason: HOSPADM

## 2017-05-22 RX ORDER — FENTANYL CITRATE 50 UG/ML
25-50 INJECTION, SOLUTION INTRAMUSCULAR; INTRAVENOUS
Status: DISCONTINUED | OUTPATIENT
Start: 2017-05-22 | End: 2017-05-22 | Stop reason: HOSPADM

## 2017-05-22 RX ORDER — ONDANSETRON 4 MG/1
4 TABLET, ORALLY DISINTEGRATING ORAL EVERY 30 MIN PRN
Status: DISCONTINUED | OUTPATIENT
Start: 2017-05-22 | End: 2017-05-22 | Stop reason: HOSPADM

## 2017-05-22 RX ORDER — ONDANSETRON 2 MG/ML
4 INJECTION INTRAMUSCULAR; INTRAVENOUS EVERY 30 MIN PRN
Status: DISCONTINUED | OUTPATIENT
Start: 2017-05-22 | End: 2017-05-22 | Stop reason: HOSPADM

## 2017-05-22 RX ORDER — PROPOFOL 10 MG/ML
INJECTION, EMULSION INTRAVENOUS CONTINUOUS PRN
Status: DISCONTINUED | OUTPATIENT
Start: 2017-05-22 | End: 2017-05-22

## 2017-05-22 RX ORDER — DEXAMETHASONE SODIUM PHOSPHATE 4 MG/ML
4 INJECTION, SOLUTION INTRA-ARTICULAR; INTRALESIONAL; INTRAMUSCULAR; INTRAVENOUS; SOFT TISSUE EVERY 10 MIN PRN
Status: DISCONTINUED | OUTPATIENT
Start: 2017-05-22 | End: 2017-05-22 | Stop reason: HOSPADM

## 2017-05-22 RX ORDER — PROMETHAZINE HYDROCHLORIDE 25 MG/ML
12.5 INJECTION, SOLUTION INTRAMUSCULAR; INTRAVENOUS
Status: DISCONTINUED | OUTPATIENT
Start: 2017-05-22 | End: 2017-05-22 | Stop reason: HOSPADM

## 2017-05-22 RX ORDER — SCOLOPAMINE TRANSDERMAL SYSTEM 1 MG/1
1 PATCH, EXTENDED RELEASE TRANSDERMAL ONCE
Status: COMPLETED | OUTPATIENT
Start: 2017-05-22 | End: 2017-05-22

## 2017-05-22 RX ORDER — ALBUTEROL SULFATE 0.83 MG/ML
2.5 SOLUTION RESPIRATORY (INHALATION) EVERY 4 HOURS PRN
Status: DISCONTINUED | OUTPATIENT
Start: 2017-05-22 | End: 2017-05-22 | Stop reason: HOSPADM

## 2017-05-22 RX ORDER — DEXAMETHASONE SODIUM PHOSPHATE 10 MG/ML
INJECTION, SOLUTION INTRAMUSCULAR; INTRAVENOUS PRN
Status: DISCONTINUED | OUTPATIENT
Start: 2017-05-22 | End: 2017-05-22

## 2017-05-22 RX ORDER — NALOXONE HYDROCHLORIDE 0.4 MG/ML
.1-.4 INJECTION, SOLUTION INTRAMUSCULAR; INTRAVENOUS; SUBCUTANEOUS
Status: DISCONTINUED | OUTPATIENT
Start: 2017-05-22 | End: 2017-05-22 | Stop reason: HOSPADM

## 2017-05-22 RX ORDER — ONDANSETRON 2 MG/ML
INJECTION INTRAMUSCULAR; INTRAVENOUS PRN
Status: DISCONTINUED | OUTPATIENT
Start: 2017-05-22 | End: 2017-05-22

## 2017-05-22 RX ORDER — CEFAZOLIN SODIUM 1 G/3ML
INJECTION, POWDER, FOR SOLUTION INTRAMUSCULAR; INTRAVENOUS PRN
Status: DISCONTINUED | OUTPATIENT
Start: 2017-05-22 | End: 2017-05-22

## 2017-05-22 RX ORDER — TRAMADOL HYDROCHLORIDE 50 MG/1
50-100 TABLET ORAL EVERY 6 HOURS PRN
Qty: 20 TABLET | Refills: 0 | Status: SHIPPED | OUTPATIENT
Start: 2017-05-22 | End: 2017-05-31

## 2017-05-22 RX ORDER — PROPOFOL 10 MG/ML
INJECTION, EMULSION INTRAVENOUS PRN
Status: DISCONTINUED | OUTPATIENT
Start: 2017-05-22 | End: 2017-05-22

## 2017-05-22 RX ADMIN — CEFAZOLIN 1 G: 1 INJECTION, POWDER, FOR SOLUTION INTRAMUSCULAR; INTRAVENOUS at 10:08

## 2017-05-22 RX ADMIN — PROPOFOL 50 MG: 10 INJECTION, EMULSION INTRAVENOUS at 09:55

## 2017-05-22 RX ADMIN — ONDANSETRON 4 MG: 2 INJECTION INTRAMUSCULAR; INTRAVENOUS at 09:59

## 2017-05-22 RX ADMIN — DEXAMETHASONE SODIUM PHOSPHATE 6 MG: 10 INJECTION, SOLUTION INTRAMUSCULAR; INTRAVENOUS at 10:00

## 2017-05-22 RX ADMIN — SODIUM CHLORIDE, POTASSIUM CHLORIDE, SODIUM LACTATE AND CALCIUM CHLORIDE: 600; 310; 30; 20 INJECTION, SOLUTION INTRAVENOUS at 10:15

## 2017-05-22 RX ADMIN — MIDAZOLAM HYDROCHLORIDE 2 MG: 1 INJECTION, SOLUTION INTRAMUSCULAR; INTRAVENOUS at 09:51

## 2017-05-22 RX ADMIN — SCOPALAMINE 1 PATCH: 1 PATCH, EXTENDED RELEASE TRANSDERMAL at 08:55

## 2017-05-22 RX ADMIN — SODIUM CHLORIDE, POTASSIUM CHLORIDE, SODIUM LACTATE AND CALCIUM CHLORIDE: 600; 310; 30; 20 INJECTION, SOLUTION INTRAVENOUS at 08:56

## 2017-05-22 RX ADMIN — LIDOCAINE HYDROCHLORIDE 40 MG: 20 INJECTION, SOLUTION INFILTRATION; PERINEURAL at 09:54

## 2017-05-22 RX ADMIN — FENTANYL CITRATE 50 MCG: 50 INJECTION, SOLUTION INTRAMUSCULAR; INTRAVENOUS at 09:51

## 2017-05-22 RX ADMIN — FENTANYL CITRATE 50 MCG: 50 INJECTION, SOLUTION INTRAMUSCULAR; INTRAVENOUS at 10:07

## 2017-05-22 RX ADMIN — PROPOFOL 100 MCG/KG/MIN: 10 INJECTION, EMULSION INTRAVENOUS at 09:56

## 2017-05-22 NOTE — IP AVS SNAPSHOT
MRN:[id number]                      After Visit Summary   5/22/2017    Luly Freeman    MRN: [id number]           Thank you!     Thank you for choosing [facility] for your care. Our goal is always to provide you with excellent care. Hearing back from our patients is one way we can continue to improve our services. Please take a few minutes to complete the written survey that you may receive in the mail after you visit with us. Thank you!        Patient Information     Date Of Birth          1965        About your hospital stay     You were admitted on:  May 22, 2017 You last received care in the:  [facility]    You were discharged on:  May 22, 2017       Who to Call     For medical emergencies, please call 911.  For non-urgent questions about your medical care, please call your primary care provider or clinic, [phone]  For questions related to your surgery, please call your surgery clinic        Attending Provider     Provider Specialty    Erma Jenkins MD General Surgery       Primary Care Provider Office Phone # Fax #    Tere Herrera [phone]       [facility] [place] [address]  [place] [address]        Your next 10 appointments already scheduled     May 31, 2017  9:00 AM CDT   (Arrive by 8:45 AM)   Post Op with Erma Jenkins MD   [facility] ([facility] [place] [facility])    [address]  [place] [address]   [phone]            Jun 05, 2017  2:00 PM CDT   MAMMOGRAM with  MAMMO[facility] ([facility] [place] [facility])    [address]  [place] [address]   [phone]           Do not wear any body powder, lotions, deodorant or perfume the day of the exam. Bring a list of all medications, especially hormones.  If your last mammogram was not done at [facility], please bring your mammogram films. We will need the name of your MD/PA to send a copy of your report.            Jun 05, 2017  3:00 PM CDT   (Arrive by  2:45 PM)   PHYSICAL with Tere eHrrera MD   [facility] ([facility])    [place] [address]   [phone]              Further instructions from your care team       Remove the scopolamine patch behind your left  ear after 24 hours after application.   After removing the patch, wash your hands and the area behind your ear thoroughly with soap and water.   The patch will still contain some medicine after use.   To avoid accidental contact or ingestion by children or pets, fold the used patch in half with the sticky side together and throw away in the trash out of the reach of children and pets.       Post-Anesthesia Patient Instructions    IMMEDIATELY FOLLOWING SURGERY:  Do not drive or operate machinery for the first twenty four hours after surgery.  Do not make any important decisions for twenty four hours after surgery or while taking narcotic pain medications or sedatives.  If you develop intractable nausea and vomiting or a severe headache please notify your doctor immediately.    FOLLOW-UP:  Please make an appointment with your surgeon as instructed. You do not need to follow up with anesthesia unless specifically instructed to do so.    WOUND CARE INSTRUCTIONS (if applicable):  Keep a dry clean dressing on the anesthesia/puncture wound site if there is drainage.  Once the wound has quit draining you may leave it open to air.  Generally you should leave the bandage intact for twenty four hours unless there is drainage.  If the epidural site drains for more than 36-48 hours please call the anesthesia department.    QUESTIONS?:  Please feel free to call your physician or the hospital  if you have any questions, and they will be happy to assist you.   Thank you for allowing Dr Jenkins and our surgical team to participate in your care. Please call with any questions or concerns to our direct line at [phone].      Please contact the office or return for questions,  "concerns or new complaints.  Margaret  may be reached at [phone]  Leave your dressing on for 48 hours, remove dressing and keep the area clean and dry. Do not soak and scrub the area.  You may shower with wound to air on Thursday morning.  Elevate right leg when seated to reduce edema.    Return to clinic in 10 days to have sutures (5-31-17) removed and Dr Jenkins will check your site.  We will also have the results of the pathology examination at that time.    Pending Results     No orders found from 5/20/2017 to 5/23/2017.            Admission Information     Date & Time Provider Department Dept. Phone    5/22/2017 Erma Jenkins MD [facility] [phone]      Your Vitals Were     Blood Pressure Temperature Respirations Height Weight Pulse Oximetry    113/70 97.4  F (36.3  C) (Oral) 18 1.727 m (5' 8\") 73.2 kg (161 lb 4.8 oz) 100%    BMI (Body Mass Index)                   24.53 kg/m2           [employer] Information     [employer] gives you secure access to your electronic health record. If you see a primary care provider, you can also send messages to your care team and make appointments. If you have questions, please call your primary care clinic.  If you do not have a primary care provider, please call [phone] and they will assist you.        Care EveryWhere ID     This is your Care EveryWhere ID. This could be used by other organizations to access your [facility] medical records  [id number]           Review of your medicines      START taking        Dose / Directions    traMADol 50 MG tablet   Commonly known as:  ULTRAM   Used for:  Soft tissue mass        Dose:   mg   Take 1-2 tablets ( mg) by mouth every 6 hours as needed for pain maximum 12 tablet(s) per day   Quantity:  20 tablet   Refills:  0         CONTINUE these medicines which have NOT CHANGED        Dose / Directions    albuterol 108 (90 BASE) MCG/ACT Inhaler   Used for:  Acute bronchitis   Generic drug:  " albuterol        INHALE 2 PUFFS BY MOUTH EVERY 6 HOURS AS NEEDED FOR SHORTNESS OF BREATH   Quantity:  8.5 g   Refills:  0       EPINEPHrine 0.3 MG/0.3ML injection   Commonly known as:  AUVI-Q   Used for:  Anaphylactic reaction        Dose:  0.3 mg   Inject 0.3 mLs into the muscle once as needed for anaphylaxis for 1 dose.   Quantity:  2 each   Refills:  2       fexofenadine 180 MG tablet   Commonly known as:  ALLEGRA        Dose:  180 mg   Take 1 tablet by mouth every morning.   Quantity:  30 tablet   Refills:  11       fluticasone 50 MCG/ACT spray   Commonly known as:  FLONASE        Dose:  2 spray   Spray 2 sprays into both nostrils daily   Refills:  0       SUBLINGUAL IMMUNOTHERAPY (SLIT)   Used for:  Perennial allergic rhinitis        Continue SLIT, 1 drop 3 x day (SL), following standard maintenance protocol.   Quantity:  1 Bottle   Refills:  3            Where to get your medicines      Some of these will need a paper prescription and others can be bought over the counter. Ask your nurse if you have questions.     Bring a paper prescription for each of these medications     traMADol 50 MG tablet                Protect others around you: Learn how to safely use, store and throw away your medicines at www.disposemymeds.org.             Medication List: This is a list of all your medications and when to take them. Check marks below indicate your daily home schedule. Keep this list as a reference.      Medications           Morning Afternoon Evening Bedtime As Needed    albuterol 108 (90 BASE) MCG/ACT Inhaler   INHALE 2 PUFFS BY MOUTH EVERY 6 HOURS AS NEEDED FOR SHORTNESS OF BREATH   Generic drug:  albuterol                                EPINEPHrine 0.3 MG/0.3ML injection   Commonly known as:  AUVI-Q   Inject 0.3 mLs into the muscle once as needed for anaphylaxis for 1 dose.                                fexofenadine 180 MG tablet   Commonly known as:  ALLEGRA   Take 1 tablet by mouth every morning.                                 fluticasone 50 MCG/ACT spray   Commonly known as:  FLONASE   Spray 2 sprays into both nostrils daily                                SUBLINGUAL IMMUNOTHERAPY (SLIT)   Continue SLIT, 1 drop 3 x day (SL), following standard maintenance protocol.                                traMADol 50 MG tablet   Commonly known as:  ULTRAM   Take 1-2 tablets ( mg) by mouth every 6 hours as needed for pain maximum 12 tablet(s) per day

## 2017-05-22 NOTE — ANESTHESIA PREPROCEDURE EVALUATION
Anesthesia Evaluation     . Pt has had prior anesthetic.     History of anesthetic complications   - PONV        ROS/MED HX    ENT/Pulmonary:     (+)allergic rhinitis, other ENT- s/p FESS 10/2013, asthma Last exacerbation: RAD,, . .    Neurologic:  - neg neurologic ROS     Cardiovascular:  - neg cardiovascular ROS       METS/Exercise Tolerance:     Hematologic:  - neg hematologic  ROS       Musculoskeletal:   (+) , , other musculoskeletal- SOFT TISSUE MASS RIGHT LOWER LEG      GI/Hepatic:  - neg GI/hepatic ROS       Renal/Genitourinary:  - ROS Renal section negative       Endo:  - neg endo ROS       Psychiatric:  - neg psychiatric ROS       Infectious Disease:  - neg infectious disease ROS       Malignancy:      - no malignancy   Other: Comment: S/p laparoscopic supracervical hysterectomy   (+) No chance of pregnancy   - neg other ROS                 Physical Exam  Normal systems: cardiovascular, pulmonary and dental    Airway   Mallampati: II  TM distance: >3 FB  Neck ROM: full    Dental     Cardiovascular   Rhythm and rate: regular and normal      Pulmonary    breath sounds clear to auscultation                    Anesthesia Plan      History & Physical Review  History and physical reviewed and following examination; no interval change.    ASA Status:  2 .    NPO Status:  > 8 hours    Plan for MAC with Intravenous and Propofol induction. Maintenance will be TIVA.  Reason for MAC:  Deep or markedly invasive procedure (G8)  PONV prophylaxis:  Ondansetron (or other 5HT-3), Scopolamine patch and Dexamethasone or Solumedrol  Patient notes significant PONV and requests additional prophylaxis       Postoperative Care  Postoperative pain management:  IV analgesics and Oral pain medications.      Consents  Anesthetic plan, risks, benefits and alternatives discussed with:  Patient..                          .

## 2017-05-22 NOTE — BRIEF OP NOTE
[facility] Brief Operative Note    Pre-operative diagnosis: Soft tissue mass, right lower extremity.   Post-operative diagnosis Soft tissue mass, right lower extremity, varicose vein, RLE   Procedure: Procedure(s):  EXCISION SOFT TISSUE MASS RIGHT LOWER LEG - Wound Class: I-Clean   Surgeon: Erma Jenkins MD   Anesthesia: Local, MAC   Estimated blood loss: 1 mL    Specimens: Soft tissue mass, RLE   Findings: Fat necrosis with overlying varicose vein.     Erma Jenkins MD, FACS    5/22/2017  10:36 AM

## 2017-05-22 NOTE — DISCHARGE INSTRUCTIONS
Remove the scopolamine patch behind your left  ear after 24 hours after application.   After removing the patch, wash your hands and the area behind your ear thoroughly with soap and water.   The patch will still contain some medicine after use.   To avoid accidental contact or ingestion by children or pets, fold the used patch in half with the sticky side together and throw away in the trash out of the reach of children and pets.       Post-Anesthesia Patient Instructions    IMMEDIATELY FOLLOWING SURGERY:  Do not drive or operate machinery for the first twenty four hours after surgery.  Do not make any important decisions for twenty four hours after surgery or while taking narcotic pain medications or sedatives.  If you develop intractable nausea and vomiting or a severe headache please notify your doctor immediately.    FOLLOW-UP:  Please make an appointment with your surgeon as instructed. You do not need to follow up with anesthesia unless specifically instructed to do so.    WOUND CARE INSTRUCTIONS (if applicable):  Keep a dry clean dressing on the anesthesia/puncture wound site if there is drainage.  Once the wound has quit draining you may leave it open to air.  Generally you should leave the bandage intact for twenty four hours unless there is drainage.  If the epidural site drains for more than 36-48 hours please call the anesthesia department.    QUESTIONS?:  Please feel free to call your physician or the hospital  if you have any questions, and they will be happy to assist you.   Thank you for allowing Dr Jenkins and our surgical team to participate in your care. Please call with any questions or concerns to our direct line at [phone].      Please contact the office or return for questions, concerns or new complaints.  Lucius Roblesr may be reached at [phone]  Leave your dressing on for 48 hours, remove dressing and keep the area clean and dry. Do not soak and scrub the area.  You may  shower with wound to air on Thursday morning.  Elevate right leg when seated to reduce edema.    Return to clinic in 10 days to have sutures (5-31-17) removed and Dr Jenkins will check your site.  We will also have the results of the pathology examination at that time.

## 2017-05-22 NOTE — CONSULTS
"[facility] Consultation    CC:  Right lower leg soft tissue mass for excision.    HPI:  This 52 year old year old female presents for excision of a soft tissue mass, right lower extremity.  She was seen in the office and the size, nature and proximity to multiple spider veins prompted suggestion that the procedure would be more safely performed in the main OR with monitored anesthetic care and local anesthesia. The patient noted a \"pea sized\" lesion under the skin on her right lower leg while shaving. Her massage therapist monitored and she had it examined by Dr. Smyth while being evaluated for a sinus infection. Definitive excision requested as this has enlarged to now 2.5 - 3.0 cm diameter. The process was first noted 10 weeks ago with the enlargement described.    Past Medical History:   Diagnosis Date     Allergic rhinitis 2011     Lump or mass in breast 2007     PONV (postoperative nausea and vomiting)      Recurrent sinusitis 2011     Varicose veins 2011     Past Surgical History:   Procedure Laterality Date     BUNIONECTOMY RT/LT  2008     ENT SURGERY  10/2013    nose surgery, Dr. Elliott            laparoscopic supracervical hysterectomy  2011    Fibroids, ovaries intact     Left Breast Bx  3/2007    Fibrocystic breast disease     PE tube placement       ventilation tube, left       Current Facility-Administered Medications   Medication     lidocaine 1 % 1 mL     sodium chloride (PF) 0.9% PF flush 3 mL     lactated ringers infusion     scopolamine (TRANSDERM) 1 MG/3DAYS 72 hr patch 1 patch     Allergies   Allergen Reactions     Seasonal Allergies        HABITS:    Social History   Substance Use Topics     Smoking status: Never Smoker     Smokeless tobacco: Never Used      Comment: no passive exposure     Alcohol use Yes      Comment: rarely       Family History   Problem Relation Age of Onset     C.A.D. Father      DIABETES No family hx of      " "CEREBROVASCULAR DISEASE No family hx of        REVIEW OF SYSTEMS:  Ten point review of systems negative except those mentioned in the HPI.     OBJECTIVE:    Ht 1.727 m (5' 8\")  Wt 73.2 kg (161 lb 4.8 oz)  BMI 24.53 kg/m2    GENERAL: Generally appears well, in no distress with appropriate affect.  HEENT:   Sclerae anicteric - No cervical, supra/infraclavciular lymphadenopathy, no thyroid masses  Respiratory:  Lungs clear to ausculation bilaterally with good air excursion  Cardiovascular:  Regular Rate and Rhythm with no murmurs gallops or rubs, normal   Extremities:  Right lower leg soft tissue mass for excision located mid portion laterally, subcutaneous ill defined, infiltrative fibrotic, nontender lesion of uncertain etiology. 2.5 x 3.0 cm and may extend to fascia.    Skin:  no suspicious lesions or rashes  Neurological: grossly intact    Psych:  Alert, oriented, affect appropriate with normal decision making ability.    IMPRESSION:  Soft tissue mass, Right lower leg for excision.    PLAN:  Risk, benefit and recuperative expectations outlined.  Will proceed.  Rick Jenkins MD, FACS    5/22/2017  8:48 AM    cc:  Dr. Herrera  "

## 2017-05-22 NOTE — ANESTHESIA POSTPROCEDURE EVALUATION
Patient: Luly Freeman    Procedure(s):  EXCISION SOFT TISSUE MASS RIGHT LOWER LEG - Wound Class: I-Clean    Diagnosis:SKIN CYST  Diagnosis Additional Information: No value filed.    Anesthesia Type:  MAC    Note:  Anesthesia Post Evaluation    Patient location during evaluation: Phase 2 and Bedside  Patient participation: Able to fully participate in evaluation  Level of consciousness: awake and alert  Pain management: adequate  Airway patency: patent  Cardiovascular status: acceptable  Respiratory status: acceptable  Hydration status: stable  PONV: none     Anesthetic complications: None          Last vitals:  Vitals:    05/22/17 0839 05/22/17 1035   BP: 114/80 (!) 85/65   Resp: 18    Temp: 97.4  F (36.3  C)    SpO2: 100% 100%         Electronically Signed By: Ravi Leija MD  May 22, 2017  10:47 AM

## 2017-05-22 NOTE — ANESTHESIA CARE TRANSFER NOTE
Patient: Luly Freeman    Procedure(s):  EXCISION SOFT TISSUE MASS RIGHT LOWER LEG - Wound Class: I-Clean    Diagnosis: SKIN CYST  Diagnosis Additional Information: No value filed.    Anesthesia Type:   MAC     Note:  Airway :Nasal Cannula  Patient transferred to:Phase II        Vitals: (Last set prior to Anesthesia Care Transfer)    CRNA VITALS  5/22/2017 1002 - 5/22/2017 1037      5/22/2017             Resp Rate (set): 8                Electronically Signed By: JORDYN Kraft CRNA  May 22, 2017  10:37 AM

## 2017-05-22 NOTE — IP AVS SNAPSHOT
[facility] Operating Room    [address] [place] [id number]    Phone:  [phone]                                       After Visit Summary   5/22/2017    Luly Freeman    MRN: [id number]           After Visit Summary Signature Page     I have received my discharge instructions, and my questions have been answered. I have discussed any challenges I see with this plan with the nurse or doctor.    ..........................................................................................................................................  Patient/Patient Representative Signature      ..........................................................................................................................................  Patient Representative Print Name and Relationship to Patient    ..................................................               ................................................  Date                                            Time    ..........................................................................................................................................  Reviewed by Signature/Title    ...................................................              ..............................................  Date                                                            Time

## 2017-05-22 NOTE — OP NOTE
PREOPERATIVE DIAGNOSIS:  Soft tissue mass, right lower extremity.      POSTOPERATIVE DIAGNOSES:   1.  Soft tissue mass, right lower extremity, fat necrosis.   2.  Varicose vein overlying soft tissue mass.      PROCEDURE:     1.  Excision soft tissue mass, right lower extremity.   2.  Ligation, division of venous varicosity, right lower extremity.      HISTORY OF PRESENT ILLNESS:  Luly Freeman is a 52-year-old female who noted a deep soft tissue mass in the right lower extremity of changing size.  She presents for excision.      OPERATIVE FINDINGS:  The lesion was grossly consistent with a discrete 1 cm diameter region of fat necrosis forming a firm mass with venous varicosities overlying.  This accounted for the palpable mass and the history of changing size during long duration of standing.      PROCEDURE IN DETAIL:  With the patient in the supine position on the operating table, IV sedation was administered by the nurse anesthetist.  The right lower extremity was prepped and draped sterilely and the requisite time out pause observed during which the patient's correct identity, planned procedure and the cam denoting the correct side and site of procedure were confirmed verbally by all members of the operating room team.  Local anesthesia was obtained with infiltration of 1% xylocaine.  A linear incision was made over the presenting portion of the mass and carried through full thickness skin to the subcutaneous tissue.  Using blunt and sharp dissection, the soft tissue mass, including fibrous tissue and fat necrosis identified, was excised in toto and normal fascia visualized.  A single venous varicosity overlay the mass and this was doubly ligated with 3-0 vicryl and divided.  The specimen was identified and submitted for pathologic section in formalin.  The wound was irrigated with antibiotic/saline solution and distal hemostasis obtained with suture ligatures of 3-0 Vicryl.  The wound was closed after  infiltration of additional 1% Xylocaine with interrupted 3-0 Vicryl in the subcutaneous tissue.  The skin was reapproximated with interrupted simple sutures of 4-0 nylon.      Sterile dressings were applied, and the patient was returned to day surgery in good condition. The estimated blood loss was 1 cc and there were no apparent complications.  The procedure was well tolerated and the patient left the operating room in good condition upon confirmation that the final sponge, needle and instrument counts were correct and upon completion of the postsurgical debriefing with concordance achieved between all members of the operating team.         MARISSA CALLES MD, FACS             D: 2017 10:39   T: 2017 10:50   MT: cw      Name:     ANIYA RIVERA   MRN:      [id number]        Account:        [id number]   :      1965           Procedure Date: 2017      Document: [id number]       cc: Tere Herrera MD

## 2017-05-24 LAB — COPATH REPORT: NORMAL

## 2017-05-31 ENCOUNTER — OFFICE VISIT (OUTPATIENT)
Dept: SURGERY | Facility: OTHER | Age: 52
End: 2017-05-31
Attending: SURGERY
Payer: COMMERCIAL

## 2017-05-31 VITALS
DIASTOLIC BLOOD PRESSURE: 64 MMHG | OXYGEN SATURATION: 99 % | SYSTOLIC BLOOD PRESSURE: 104 MMHG | BODY MASS INDEX: 24.4 KG/M2 | HEART RATE: 69 BPM | TEMPERATURE: 98.9 F | HEIGHT: 68 IN | WEIGHT: 161 LBS

## 2017-05-31 DIAGNOSIS — M79.9 LESION OF SOFT TISSUE OF LOWER LEG AND ANKLE: Primary | ICD-10-CM

## 2017-05-31 DIAGNOSIS — I83.90 ASYMPTOMATIC VARICOSE VEINS OF LOWER EXTREMITY, UNSPECIFIED LATERALITY: ICD-10-CM

## 2017-05-31 PROCEDURE — 99024 POSTOP FOLLOW-UP VISIT: CPT | Performed by: SURGERY

## 2017-05-31 ASSESSMENT — PAIN SCALES - GENERAL: PAINLEVEL: NO PAIN (0)

## 2017-05-31 NOTE — PATIENT INSTRUCTIONS
Thank you for allowing Dr. Jenkins and our surgical team to participate in your care.  If you have a scheduling or an appointment question please contact Margaret, our Health Unit Coordinator at her direct line [phone].   ALL nursing questions or concerns can be directed to your surgical nurse at: [phone]

## 2017-05-31 NOTE — PROGRESS NOTES
"  HPI:  Returns for first post surgical examination following Excision soft tissue lesion right lower leg without complaint.  Denies wound complication, fever, chills, nausea or vomiting.  A venous varicosity was ligated and fat necrosis identified.  ROS:   10 point ROS neg other than the symptoms noted above in the HPI.    Examination:  /64 (BP Location: Right arm, Patient Position: Chair, Cuff Size: Adult Regular)  Pulse 69  Temp 98.9  F (37.2  C) (Tympanic)  Ht 5' 8\" (1.727 m)  Wt 161 lb (73 kg)  SpO2 99%  BMI 24.48 kg/m2    Constitutional: healthy, alert and no distress  HEENT:  No obvious(s masses, lesions,  or abnormalities  Wound healing nicely without evidence of infection.  Sutures removed; steri strips applied.  Impression:  Satisfactory course.  Recommendations:  The technical aspects of the procedure and operative and pathologic findings were reviewed.  She was reminded to elevate the extremity to reduce edema; leave steri strips in place to support skin closure.  Return for questions, concerns or new complaints.  Erma Jenkins MD, FACS    5/31/2017  9:35 AM             "

## 2017-05-31 NOTE — NURSING NOTE
"Chief Complaint   Patient presents with     Surgical Followup     s/p-excision of soft tissue mass right lower extemity, ligation, division of venous varicosities right lower extremity 5/22/17.  Feeling better.        Initial /64 (BP Location: Right arm, Patient Position: Chair, Cuff Size: Adult Regular)  Pulse 69  Temp 98.9  F (37.2  C) (Tympanic)  Ht 5' 8\" (1.727 m)  Wt 161 lb (73 kg)  SpO2 99%  BMI 24.48 kg/m2 Estimated body mass index is 24.48 kg/(m^2) as calculated from the following:    Height as of this encounter: 5' 8\" (1.727 m).    Weight as of this encounter: 161 lb (73 kg).  Medication Reconciliation: franny MARTINEZ      "

## 2017-05-31 NOTE — MR AVS SNAPSHOT
After Visit Summary   5/31/2017    Luly Freeman    MRN: [id number]           Patient Information     Date Of Birth          1965        Visit Information        Provider Department      5/31/2017 9:00 AM Erma Jenkins MD [facility]        Care Instructions    Thank you for allowing Dr. Jenkins and our surgical team to participate in your care.  If you have a scheduling or an appointment question please contact Margaret, our Health Unit Coordinator at her direct line [phone].   ALL nursing questions or concerns can be directed to your surgical nurse at: [phone]          Follow-ups after your visit        Your next 10 appointments already scheduled     Jun 05, 2017  2:00 PM CDT   MAMMOGRAM with  MAMMOGRAM [facility] ([facility])    [address]  [facility] [address]   [phone]           Do not wear any body powder, lotions, deodorant or perfume the day of the exam. Bring a list of all medications, especially hormones.  If your last mammogram was not done at [place], please bring your mammogram films. We will need the name of your MD/PA to send a copy of your report.            Jun 05, 2017  3:00 PM CDT   (Arrive by 2:45 PM)   PHYSICAL with Tere Herrera MD   [facility] ([facility])    [address]  [facility] [address]   [phone]              Who to contact     If you have questions or need follow up information about today's clinic visit or your schedule please contact [facility] directly at [phone].  Normal or non-critical lab and imaging results will be communicated to you by MyChart, letter or phone within 4 business days after the clinic has received the results. If you do not hear from us within 7 days, please contact the clinic through MyChart or phone. If you have a critical or abnormal lab result, we will notify you by phone as soon as possible.  Submit refill requests  "through Carmudi or call your pharmacy and they will forward the refill request to us. Please allow 3 business days for your refill to be completed.          Additional Information About Your Visit        MATIvisionhart Information     Carmudi gives you secure access to your electronic health record. If you see a primary care provider, you can also send messages to your care team and make appointments. If you have questions, please call your primary care clinic.  If you do not have a primary care provider, please call [phone] and they will assist you.        Care EveryWhere ID     This is your Care EveryWhere ID. This could be used by other organizations to access your [facility] medical records  [id number]        Your Vitals Were     Pulse Temperature Height Pulse Oximetry BMI (Body Mass Index)       69 98.9  F (37.2  C) (Tympanic) 5' 8\" (1.727 m) 99% 24.48 kg/m2        Blood Pressure from Last 3 Encounters:   05/31/17 104/64   05/22/17 112/74   05/15/17 102/70    Weight from Last 3 Encounters:   05/31/17 161 lb (73 kg)   05/22/17 161 lb 4.8 oz (73.2 kg)   05/15/17 160 lb (72.6 kg)              Today, you had the following     No orders found for display       Primary Care Provider Office Phone # Fax #    Tere Herrera [phone]       [facility] [place] [address]  [place] [address]        Thank you!     [facility] you for [facility]  [facility] your [facility]. Our goal is always to provide you with excellent care. Hearing back from our patients is one way we can continue to improve our services. Please take a few minutes to complete the written survey that you may receive in the mail after your visit with us. Thank you!             Your Updated Medication List - Protect others around you: Learn how to safely use, store and throw away your medicines at www.disposemymeds.org.          This list is accurate as of: 5/31/17  9:39 AM.  Always use your most recent med list.                "    Brand Name Dispense Instructions for use    albuterol 108 (90 BASE) MCG/ACT Inhaler   Generic drug:  albuterol     8.5 g    INHALE 2 PUFFS BY MOUTH EVERY 6 HOURS AS NEEDED FOR SHORTNESS OF BREATH       EPINEPHrine 0.3 MG/0.3ML injection    AUVI-Q    2 each    Inject 0.3 mLs into the muscle once as needed for anaphylaxis for 1 dose.       fexofenadine 180 MG tablet    ALLEGRA    30 tablet    Take 1 tablet by mouth every morning.       fluticasone 50 MCG/ACT spray    FLONASE     Spray 2 sprays into both nostrils daily       SUBLINGUAL IMMUNOTHERAPY (SLIT)     1 Bottle    Continue SLIT, 1 drop 3 x day (SL), following standard maintenance protocol.

## 2017-06-05 ENCOUNTER — OFFICE VISIT (OUTPATIENT)
Dept: FAMILY MEDICINE | Facility: OTHER | Age: 52
End: 2017-06-05
Attending: FAMILY MEDICINE
Payer: COMMERCIAL

## 2017-06-05 VITALS
DIASTOLIC BLOOD PRESSURE: 60 MMHG | WEIGHT: 160 LBS | HEART RATE: 66 BPM | HEIGHT: 68 IN | RESPIRATION RATE: 20 BRPM | BODY MASS INDEX: 24.25 KG/M2 | SYSTOLIC BLOOD PRESSURE: 120 MMHG | OXYGEN SATURATION: 100 %

## 2017-06-05 DIAGNOSIS — Z12.31 VISIT FOR SCREENING MAMMOGRAM: Primary | ICD-10-CM

## 2017-06-05 DIAGNOSIS — Z00.00 ROUTINE GENERAL MEDICAL EXAMINATION AT A HEALTH CARE FACILITY: Primary | ICD-10-CM

## 2017-06-05 DIAGNOSIS — L98.9 SKIN LESION: ICD-10-CM

## 2017-06-05 DIAGNOSIS — D22.5 MELANOCYTIC NEVUS OF TRUNK: ICD-10-CM

## 2017-06-05 DIAGNOSIS — Z12.11 SPECIAL SCREENING FOR MALIGNANT NEOPLASMS, COLON: ICD-10-CM

## 2017-06-05 PROCEDURE — 87624 HPV HI-RISK TYP POOLED RSLT: CPT | Mod: 90 | Performed by: FAMILY MEDICINE

## 2017-06-05 PROCEDURE — G0123 SCREEN CERV/VAG THIN LAYER: HCPCS | Performed by: FAMILY MEDICINE

## 2017-06-05 PROCEDURE — 99000 SPECIMEN HANDLING OFFICE-LAB: CPT | Performed by: FAMILY MEDICINE

## 2017-06-05 PROCEDURE — 99396 PREV VISIT EST AGE 40-64: CPT | Performed by: FAMILY MEDICINE

## 2017-06-05 PROCEDURE — G0202 SCR MAMMO BI INCL CAD: HCPCS | Mod: TC | Performed by: RADIOLOGY

## 2017-06-05 ASSESSMENT — PAIN SCALES - GENERAL: PAINLEVEL: NO PAIN (0)

## 2017-06-05 NOTE — MR AVS SNAPSHOT
After Visit Summary   6/5/2017    Luly Freeman    MRN: [id number]           Patient Information     Date Of Birth          1965        Visit Information        Provider Department      6/5/2017 3:00 PM Tere Herrera MD [facility]        Today's Diagnoses     Routine general medical examination at a health care facility    -  1    Special screening for malignant neoplasms, colon          Care Instructions      Preventive Health Recommendations  Female Ages 50 - 64    Yearly exam: See your health care provider every year in order to  o Review health changes.   o Discuss preventive care.    o Review your medicines if your doctor has prescribed any.      Get a Pap test every three years (unless you have an abnormal result and your provider advises testing more often).    If you get Pap tests with HPV test, you only need to test every 5 years, unless you have an abnormal result.     You do not need a Pap test if your uterus was removed (hysterectomy) and you have not had cancer.    You should be tested each year for STDs (sexually transmitted diseases) if you're at risk.     Have a mammogram every 1 to 2 years.    Have a colonoscopy at age 50, or have a yearly FIT test (stool test). These exams screen for colon cancer.      Have a cholesterol test every 5 years, or more often if advised.    Have a diabetes test (fasting glucose) every three years. If you are at risk for diabetes, you should have this test more often.     If you are at risk for osteoporosis (brittle bone disease), think about having a bone density scan (DEXA).    Shots: Get a flu shot each year. Get a tetanus shot every 10 years.    Nutrition:     Eat at least 5 servings of fruits and vegetables each day.    Eat whole-grain bread, whole-wheat pasta and brown rice instead of white grains and rice.    Talk to your provider about Calcium and Vitamin D.     Lifestyle    Exercise at least 150 minutes a week (30 minutes a day,  5 days a week). This will help you control your weight and prevent disease.    Limit alcohol to one drink per day.    No smoking.     Wear sunscreen to prevent skin cancer.     See your dentist every six months for an exam and cleaning.    See your eye doctor every 1 to 2 years.            Follow-ups after your visit        Additional Services     GENERAL SURG ADULT REFERRAL       Your provider has referred you to: [facility]: [facility] [phone]   [email or web address]      PLEASE SCHEDULE IN THE FALL  Please be aware that coverage of these services is subject to the terms and limitations of your health insurance plan.  Call member services at your health plan with any benefit or coverage questions.      Please bring the following with you to your appointment:    (1) Any X-Rays, CTs or MRIs which have been performed.  Contact the facility where they were done to arrange for  prior to your scheduled appointment.   (2) List of current medications   (3) This referral request   (4) Any documents/labs given to you for this referral                  Your next 10 appointments already scheduled     Jul 03, 2017  3:30 PM CDT   (Arrive by 3:15 PM)   PHYSICAL with Tere Herrera MD   [facility] ([facility])    [address]  [facility] [id number] [phone]              Who to contact     If you have questions or need follow up information about today's clinic visit or your schedule please contact [facility] directly at [phone].  Normal or non-critical lab and imaging results will be communicated to you by MyChart, letter or phone within 4 business days after the clinic has received the results. If you do not hear from us within 7 days, please contact the clinic through MyChart or phone. If you have a critical or abnormal lab result, we will notify you by phone as soon as possible.  Submit refill requests  "through GBS or call your pharmacy and they will forward the refill request to us. Please allow 3 business days for your refill to be completed.          Additional Information About Your Visit        Hook Mobilehart Information     GBS gives you secure access to your electronic health record. If you see a primary care provider, you can also send messages to your care team and make appointments. If you have questions, please call your primary care clinic.  If you do not have a primary care provider, please call [phone] and they will assist you.        Care EveryWhere ID     This is your Care EveryWhere ID. This could be used by other organizations to access your [facility] medical records  [id number]        Your Vitals Were     Pulse Respirations Height Pulse Oximetry BMI (Body Mass Index)       66 20 5' 8\" (1.727 m) 100% 24.33 kg/m2        Blood Pressure from Last 3 Encounters:   06/05/17 120/60   05/31/17 104/64   05/22/17 112/74    Weight from Last 3 Encounters:   06/05/17 160 lb (72.6 kg)   05/31/17 161 lb (73 kg)   05/22/17 161 lb 4.8 oz (73.2 kg)              We Performed the Following     GENERAL SURG ADULT REFERRAL        Primary Care Provider Office Phone # Fax #    Tere Herrera [phone]       [facility] [place] [address] [facility] [address]        Thank you!     Thank you for choosing [facility]  for your care. Our goal is always to provide you with excellent care. Hearing back from our patients is one way we can continue to improve our services. Please take a few minutes to complete the written survey that you may receive in the mail after your visit with us. Thank you!             Your Updated Medication List - Protect others around you: Learn how to safely use, store and throw away your medicines at www.disposemymeds.org.          This list is accurate as of: 6/5/17  3:42 PM.  Always use your most recent med list.                   Brand Name Dispense " Instructions for use    albuterol 108 (90 BASE) MCG/ACT Inhaler   Generic drug:  albuterol     8.5 g    INHALE 2 PUFFS BY MOUTH EVERY 6 HOURS AS NEEDED FOR SHORTNESS OF BREATH       EPINEPHrine 0.3 MG/0.3ML injection    AUVI-Q    2 each    Inject 0.3 mLs into the muscle once as needed for anaphylaxis for 1 dose.       fexofenadine 180 MG tablet    ALLEGRA    30 tablet    Take 1 tablet by mouth every morning.       fluticasone 50 MCG/ACT spray    FLONASE     Spray 2 sprays into both nostrils daily       SUBLINGUAL IMMUNOTHERAPY (SLIT)     1 Bottle    Continue SLIT, 1 drop 3 x day (SL), following standard maintenance protocol.

## 2017-06-05 NOTE — NURSING NOTE
"Chief Complaint   Patient presents with     Physical       Initial /60  Pulse 66  Resp 20  Ht 5' 8\" (1.727 m)  Wt 160 lb (72.6 kg)  SpO2 100%  BMI 24.33 kg/m2 Estimated body mass index is 24.33 kg/(m^2) as calculated from the following:    Height as of this encounter: 5' 8\" (1.727 m).    Weight as of this encounter: 160 lb (72.6 kg).  Medication Reconciliation: complete   Kimberly Ham      "

## 2017-06-05 NOTE — PROGRESS NOTES
SUBJECTIVE:     CC: Luly Freeman is an 52 year old woman who presents for preventive health visit.     Healthy Habits:    Do you get at least three servings of calcium containing foods daily (dairy, green leafy vegetables, etc.)? yes    Amount of exercise or daily activities, outside of work: 3 day(s) per week    Problems taking medications regularly No    Medication side effects: No    Have you had an eye exam in the past two years? yes    Do you see a dentist twice per year? yes    Do you have sleep apnea, excessive snoring or daytime drowsiness?no            Today's PHQ-2 Score:   PHQ-2 ( 1999 Pf[employer]) 2/24/2014   Q1: Little interest or pleasure in doing things 0   Q2: Feeling down, depressed or hopeless 0   PHQ-2 Score 0       Abuse: Current or Past(Physical, Sexual or Emotional)- No  Do you feel safe in your environment - Yes    Social History   Substance Use Topics     Smoking status: Never Smoker     Smokeless tobacco: Never Used      Comment: no passive exposure     Alcohol use Yes      Comment: rarely     social drinker    Recent Labs   Lab Test  02/24/14   1023   CHOL  148   HDL  67   LDL  67   TRIG  68   CHOLHDLRATIO  2.2       Reviewed orders with patient.  Reviewed health maintenance and updated orders accordingly - Yes    Mammo Decision Support:  Patient over age 50, mutual decision to screen reflected in health maintenance.    Pertinent mammograms are reviewed under the imaging tab.  History of abnormal Pap smear: NO - age 30- 65 PAP every 3 years recommended    Reviewed and updated as needed this visit by clinical staff  Tobacco  Allergies  Meds  Med Hx  Surg Hx  Fam Hx  Soc Hx        Reviewed and updated as needed this visit by Provider        Past Medical History:   Diagnosis Date     Allergic rhinitis 06/22/2011     Lump or mass in breast 03/12/2007     PONV (postoperative nausea and vomiting)      Recurrent sinusitis 07/20/2011     Varicose veins 06/22/2011      Past Surgical History:    Procedure Laterality Date     BUNIONECTOMY RT/LT  2008     ENT SURGERY  10/2013    nose surgery, Dr. Elliott     EXCISE MASS LOWER EXTREMITY Right 2017    Procedure: EXCISE MASS LOWER EXTREMITY;  EXCISION SOFT TISSUE MASS RIGHT LOWER LEG;  Surgeon: Erma Jenkins MD;  Location: [facility] OR            laparoscopic supracervical hysterectomy      Fibroids, ovaries intact     Left Breast Bx  3/2007    Fibrocystic breast disease     PE tube placement       ventilation tube, left       Obstetric History     No data available          ROS:  C: NEGATIVE for fever, chills, change in weight  I: NEGATIVE for worrisome rashes, moles or lesions  E: NEGATIVE for vision changes or irritation  ENT: NEGATIVE for ear, mouth and throat problems  R: NEGATIVE for significant cough or SOB  B: NEGATIVE for masses, tenderness or discharge  CV: NEGATIVE for chest pain, palpitations or peripheral edema  GI: NEGATIVE for nausea, abdominal pain, heartburn, or change in bowel habits  : NEGATIVE for unusual urinary or vaginal symptoms. Periods are regular.  M: NEGATIVE for significant arthralgias or myalgia  N: NEGATIVE for weakness, dizziness or paresthesias  E: NEGATIVE for temperature intolerance, skin/hair changes  H: NEGATIVE for bleeding problems  P: NEGATIVE for changes in mood or affect    BP Readings from Last 3 Encounters:   17 120/60   17 104/64   17 112/74    Wt Readings from Last 3 Encounters:   17 160 lb (72.6 kg)   17 161 lb (73 kg)   17 161 lb 4.8 oz (73.2 kg)                  Patient Active Problem List   Diagnosis     Multiple respiratory allergies     ACP (advance care planning)     Past Surgical History:   Procedure Laterality Date     BUNIONECTOMY RT/LT  2008     ENT SURGERY  10/2013    nose surgeryDr. Elliott     EXCISE MASS LOWER EXTREMITY Right 2017    Procedure: EXCISE MASS LOWER EXTREMITY;  EXCISION SOFT TISSUE MASS RIGHT LOWER LEG;  Surgeon:  "Erma Jenkins MD;  Location: [facility] OR            laparoscopic supracervical hysterectomy      Fibroids, ovaries intact     Left Breast Bx  3/2007    Fibrocystic breast disease     PE tube placement       ventilation tube, left         Social History   Substance Use Topics     Smoking status: Never Smoker     Smokeless tobacco: Never Used      Comment: no passive exposure     Alcohol use Yes      Comment: rarely     Family History   Problem Relation Age of Onset     C.A.D. Father      DIABETES No family hx of      CEREBROVASCULAR DISEASE No family hx of          Current Outpatient Prescriptions   Medication Sig Dispense Refill     SUBLINGUAL IMMUNOTHERAPY, SLIT, Continue SLIT, 1 drop 3 x day (SL), following standard maintenance protocol. 1 Bottle 3     fluticasone (FLONASE) 50 MCG/ACT nasal spray Spray 2 sprays into both nostrils daily       ALBUTEROL 108 (90 BASE) MCG/ACT inhaler INHALE 2 PUFFS BY MOUTH EVERY 6 HOURS AS NEEDED FOR SHORTNESS OF BREATH 8.5 g 0     EPINEPHrine (AUVI-Q) 0.3 MG/0.3ML injection Inject 0.3 mLs into the muscle once as needed for anaphylaxis for 1 dose. 2 each 2     fexofenadine (ALLEGRA) 180 MG tablet Take 1 tablet by mouth every morning. 30 tablet 11     Allergies   Allergen Reactions     Seasonal Allergies      OBJECTIVE:     /60  Pulse 66  Resp 20  Ht 5' 8\" (1.727 m)  Wt 160 lb (72.6 kg)  SpO2 100%  BMI 24.33 kg/m2  EXAM:  GENERAL: healthy, alert and no distress  EYES: Eyes grossly normal to inspection, PERRL and conjunctivae and sclerae normal  HENT: ear canals and TM's normal, nose and mouth without ulcers or lesions  NECK: no adenopathy, no asymmetry, masses, or scars and thyroid normal to palpation  RESP: lungs clear to auscultation - no rales, rhonchi or wheezes  BREAST: normal without masses, tenderness or nipple discharge and no palpable axillary masses or adenopathy  CV: regular rate and rhythm, normal S1 S2, no S3 or S4, no murmur, click or rub, no " "peripheral edema and peripheral pulses strong  ABDOMEN: soft, nontender, no hepatosplenomegaly, no masses and bowel sounds normal   (female): normal female external genitalia, normal urethral meatus, vaginal mucosa pink, moist, well rugated, and normal cervix/adnexa/uterus without masses or discharge  MS: no gross musculoskeletal defects noted, no edema  SKIN: lesion, 0.5 cm,scaly with central umbilication nonhealing of the left mid anterior thigh, scattered benign moles of the trunk  NEURO: Normal strength and tone, mentation intact and speech normal  PSYCH: mentation appears normal, affect normal/bright  LYMPH: no cervical, supraclavicular, axillary, or inguinal adenopathy    ASSESSMENT/PLAN:     1. Routine general medical examination at a health care facility  Mammogram was done this afternoon  - A pap thin layer screen with  HPV - recommended age 30 - 65 years (select HPV order below)  - HPV High Risk Types DNA Cervical    2. Special screening for malignant neoplasms, colon  Referral done for scope in the fall  - GENERAL SURG ADULT REFERRAL    3. Skin lesion  Left anterior thigh, schedule for removal    4. Melanocytic nevus of trunk  Multiple, Benign, follow      COUNSELING:   Reviewed preventive health counseling, as reflected in patient instructions       Regular exercise       Healthy diet/nutrition       Colon cancer screening         reports that she has never smoked. She has never used smokeless tobacco.    Estimated body mass index is 24.33 kg/(m^2) as calculated from the following:    Height as of this encounter: 5' 8\" (1.727 m).    Weight as of this encounter: 160 lb (72.6 kg).       Counseling Resources:  ATP IV Guidelines  Pooled Cohorts Equation Calculator  Breast Cancer Risk Calculator  FRAX Risk Assessment  ICSI Preventive Guidelines  Dietary Guidelines for Americans, 2010  USDA's MyPlate  ASA Prophylaxis  Lung CA Screening    Tere Herrera MD  [facility] [place]  "

## 2017-06-05 NOTE — LETTER
[facility] [place]  [address]  [facility] [address]  [phone]        June 13, 2017    Luly Freeman  [address]  [facility] [address]          Dear Luly Freeman    Your pap smear is normal.  It is generally recommended that women have a yearly pelvic exam.  If your provider  requested that you have a pap smear more frequently because of any previous problems please schedule that appointment as requested.  If you have any questions please call the clinic at [phone].      Sincerely,        Tere Herrera MD

## 2017-06-13 LAB
COPATH REPORT: NORMAL
FINAL DIAGNOSIS: NORMAL
HPV HR 12 DNA CVX QL NAA+PROBE: NEGATIVE
HPV16 DNA SPEC QL NAA+PROBE: NEGATIVE
HPV18 DNA SPEC QL NAA+PROBE: NEGATIVE
PAP: NORMAL
SPECIMEN DESCRIPTION: NORMAL

## 2017-06-15 ENCOUNTER — TELEPHONE (OUTPATIENT)
Dept: ALLERGY | Facility: OTHER | Age: 52
End: 2017-06-15

## 2017-06-15 NOTE — TELEPHONE ENCOUNTER
[facility] requests a refill of SLIT drops.  This will be done after a signature is obtained from the ENT provider.    Their last follow-up visit was 9/2016 with NAZANIN Tucker.  She is not due for an appointment at this time.   I tried to reach Luly to see how she's doing on drops, and confirm that she has current epi pens.  NA/LM to call.  Lydia Lama RN

## 2017-06-22 ENCOUNTER — TELEPHONE (OUTPATIENT)
Dept: SURGERY | Facility: OTHER | Age: 52
End: 2017-06-22

## 2017-06-22 NOTE — TELEPHONE ENCOUNTER
Left message for patient to return our call to set up her meet and greet colonoscopy.  Patient was approved by surgery education to be able to have a meet and greet colonoscopy.

## 2017-07-31 ENCOUNTER — OFFICE VISIT (OUTPATIENT)
Dept: FAMILY MEDICINE | Facility: OTHER | Age: 52
End: 2017-07-31
Attending: FAMILY MEDICINE
Payer: COMMERCIAL

## 2017-07-31 VITALS
HEIGHT: 68 IN | HEART RATE: 75 BPM | OXYGEN SATURATION: 100 % | WEIGHT: 160 LBS | BODY MASS INDEX: 24.25 KG/M2 | DIASTOLIC BLOOD PRESSURE: 60 MMHG | RESPIRATION RATE: 19 BRPM | SYSTOLIC BLOOD PRESSURE: 108 MMHG

## 2017-07-31 DIAGNOSIS — L98.9 SKIN LESION OF LEFT LOWER EXTREMITY: Primary | ICD-10-CM

## 2017-07-31 DIAGNOSIS — D48.5 NEOPLASM OF UNCERTAIN BEHAVIOR OF SKIN: ICD-10-CM

## 2017-07-31 DIAGNOSIS — T81.89XA NONHEALING SURGICAL WOUND, INITIAL ENCOUNTER: ICD-10-CM

## 2017-07-31 PROCEDURE — 88305 TISSUE EXAM BY PATHOLOGIST: CPT | Mod: TC | Performed by: FAMILY MEDICINE

## 2017-07-31 PROCEDURE — 11402 EXC TR-EXT B9+MARG 1.1-2 CM: CPT | Performed by: FAMILY MEDICINE

## 2017-07-31 ASSESSMENT — ANXIETY QUESTIONNAIRES
GAD7 TOTAL SCORE: 2
1. FEELING NERVOUS, ANXIOUS, OR ON EDGE: NOT AT ALL
6. BECOMING EASILY ANNOYED OR IRRITABLE: SEVERAL DAYS
5. BEING SO RESTLESS THAT IT IS HARD TO SIT STILL: NOT AT ALL
7. FEELING AFRAID AS IF SOMETHING AWFUL MIGHT HAPPEN: NOT AT ALL
3. WORRYING TOO MUCH ABOUT DIFFERENT THINGS: SEVERAL DAYS
2. NOT BEING ABLE TO STOP OR CONTROL WORRYING: NOT AT ALL
IF YOU CHECKED OFF ANY PROBLEMS ON THIS QUESTIONNAIRE, HOW DIFFICULT HAVE THESE PROBLEMS MADE IT FOR YOU TO DO YOUR WORK, TAKE CARE OF THINGS AT HOME, OR GET ALONG WITH OTHER PEOPLE: NOT DIFFICULT AT ALL

## 2017-07-31 ASSESSMENT — PATIENT HEALTH QUESTIONNAIRE - PHQ9: 5. POOR APPETITE OR OVEREATING: NOT AT ALL

## 2017-07-31 ASSESSMENT — PAIN SCALES - GENERAL: PAINLEVEL: NO PAIN (0)

## 2017-07-31 NOTE — MR AVS SNAPSHOT
After Visit Summary   7/31/2017    Luly Freeman    MRN: [id number]           Patient Information     Date Of Birth          1965        Visit Information        Provider Department      7/31/2017 8:30 AM Tere Herrera MD [facility] [place]        Today's Diagnoses     Skin lesion of left lower extremity    -  1    Nonhealing surgical wound, initial encounter        Neoplasm of uncertain behavior of skin           Follow-ups after your visit        Follow-up notes from your care team     Return in about 10 days (around 8/10/2017).      Your next 10 appointments already scheduled     Aug 02, 2017  2:15 PM CDT   (Arrive by 2:00 PM)   Return Visit with Erma Jenkins MD   [facility] [place] )    [address]  [place] [address]   [phone]            Aug 07, 2017  8:30 AM CDT   (Arrive by 8:15 AM)   SHORT with Tere Herrera MD   [facility] ([facility] [place] )    [address]  [place] [address]   [phone]              Who to contact     If you have questions or need follow up information about today's clinic visit or your schedule please contact [facility] directly at [phone].  Normal or non-critical lab and imaging results will be communicated to you by MyChart, letter or phone within 4 business days after the clinic has received the results. If you do not hear from us within 7 days, please contact the clinic through MyChart or phone. If you have a critical or abnormal lab result, we will notify you by phone as soon as possible.  Submit refill requests through [employer] or call your pharmacy and they will forward the refill request to us. Please allow 3 business days for your refill to be completed.          Additional Information About Your Visit        MyChart Information     [employer] gives you secure access to your electronic health record. If you see a primary care provider, you can also  "send messages to your care team and make appointments. If you have questions, please call your primary care clinic.  If you do not have a primary care provider, please call [phone] and they will assist you.        Care EveryWhere ID     This is your Care EveryWhere ID. This could be used by other organizations to access your [place] medical records  [id number]        Your Vitals Were     Pulse Respirations Height Pulse Oximetry Breastfeeding? BMI (Body Mass Index)    75 19 5' 8\" (1.727 m) 100% No 24.33 kg/m2       Blood Pressure from Last 3 Encounters:   07/31/17 108/60   06/05/17 120/60   05/31/17 104/64    Weight from Last 3 Encounters:   07/31/17 160 lb (72.6 kg)   06/05/17 160 lb (72.6 kg)   05/31/17 161 lb (73 kg)              We Performed the Following     trunk, arms, legs        Primary Care Provider Office Phone # Fax #    Tere Herrera [phone]       [facility] [place] [address] [facility] [address]        Equal Access to Services     [facility] : Hadii aad ku hadasho Soomaali, waaxda luqadaha, qaybta kaalmada adeelsieyada, suleman ng . So [facility] [phone].    ATENCIÓN: Si habla español, tiene a morocho disposición servicios gratuitos de asistencia lingüística. [facility] [phone].    We comply with applicable federal civil rights laws and [place] laws. We do not discriminate on the basis of race, color, national origin, age, disability sex, sexual orientation or gender identity.            Thank you!     Thank you for choosing [facility] [place]  for your care. Our goal is always to provide you with excellent care. Hearing back from our patients is one way we can continue to improve our services. Please take a few minutes to complete the written survey that you may receive in the mail after your visit with us. Thank you!             Your Updated Medication List - Protect others around you: Learn how to safely use, store and throw " away your medicines at www.disposemymeds.org.          This list is accurate as of: 7/31/17  9:19 AM.  Always use your most recent med list.                   Brand Name Dispense Instructions for use Diagnosis    albuterol 108 (90 BASE) MCG/ACT Inhaler   Generic drug:  albuterol     8.5 g    INHALE 2 PUFFS BY MOUTH EVERY 6 HOURS AS NEEDED FOR SHORTNESS OF BREATH    Acute bronchitis       EPINEPHrine 0.3 MG/0.3ML injection    AUVI-Q    2 each    Inject 0.3 mLs into the muscle once as needed for anaphylaxis for 1 dose.    Anaphylactic reaction       fexofenadine 180 MG tablet    ALLEGRA    30 tablet    Take 1 tablet by mouth every morning.        fluticasone 50 MCG/ACT spray    FLONASE     Spray 2 sprays into both nostrils daily        SUBLINGUAL IMMUNOTHERAPY (SLIT)     1 Bottle    Continue SLIT, 1 drop 3 x day (SL), following standard maintenance protocol.    Perennial allergic rhinitis

## 2017-07-31 NOTE — NURSING NOTE
"Chief Complaint   Patient presents with     Lesion Removal     left anterior thigh       Initial /60  Pulse 75  Resp 19  Ht 5' 8\" (1.727 m)  Wt 160 lb (72.6 kg)  SpO2 100%  Breastfeeding? No  BMI 24.33 kg/m2 Estimated body mass index is 24.33 kg/(m^2) as calculated from the following:    Height as of this encounter: 5' 8\" (1.727 m).    Weight as of this encounter: 160 lb (72.6 kg).  Medication Reconciliation: complete   Kimberly Ham      "

## 2017-07-31 NOTE — PROGRESS NOTES
Subjective: Luly Freeman a 52 year old female who presents today for lesion removal. The lesion(s) is/are located on the upper left leg number 1 and measures 0.6cm She The patient reports the lesion is nonhealing and denies other significant symptoms on ROS. She notes another nonhealing site of the right lower posterior leg with the distal area that hasn't healed   Medications reviewed.    Pause for the cause has been completed prior to the prceedure.   1. Luly was identified by both name and date of birth   2. The correct site was identified   3. Site was marked by provider    4. Written informed consent correct and signed or verbal authorization  to proceed was obtained   5. Verifed necessary supplies, equipment, and diagnostics are available    6. Time out was performed immediately prior to procedure    Objective: The lesion(s) is/are of the above mentioned size and location and is/are erythematous. The area was prepped and appropriately anesthetized. Using the usual technique, full thickness elliptical excision in total was performed. An appropriate dressing was applied. The procedure was well tolerated and without complications.     Assessment:   (L98.9) Skin lesion of left lower extremity  (primary encounter diagnosis)  Comment:   Plan: sutures out in 10 days, keep dry 48 hours, apply antibiotic ointment bid    (T81.89XA) Nonhealing surgical wound, initial encounter  Comment: right lower extremitiy  Plan: she is advised to recheck with Dr. Jenkins regarding this, this was a fatty tumor removed in May        Plan: Follow up: The specimen is labelled and sent to pathology for evaluation., Return for suture removal in 10 days.. Wound care instructions provided.  Patient was instructed to be alert for any signs of cutaneous infection.   Wound Care Instructions    1.  Keep area dry today.    2.  Starting tomorrow wash gently with soap and water once daily.      3.  Apply Vaseline or antibiotic ointment to the  area and cover with a bandage if desired.  Do not let it dry out and form a scab as this will make the resulting scar more noticeable.    4. Protect the area from sun for up to one year afterward as the scar is continuing to remodel.  Sun exposure will also make the resulting scar more noticeable.    5.  Call if the area is very red, tender, has a discharge or is very itchy while healing, or if you have any other questions.  These may be signs of early infection or allergy.

## 2017-08-01 ASSESSMENT — PATIENT HEALTH QUESTIONNAIRE - PHQ9: SUM OF ALL RESPONSES TO PHQ QUESTIONS 1-9: 2

## 2017-08-01 ASSESSMENT — ANXIETY QUESTIONNAIRES: GAD7 TOTAL SCORE: 2

## 2017-08-02 ENCOUNTER — OFFICE VISIT (OUTPATIENT)
Dept: SURGERY | Facility: OTHER | Age: 52
End: 2017-08-02
Attending: SURGERY
Payer: COMMERCIAL

## 2017-08-02 VITALS
OXYGEN SATURATION: 99 % | RESPIRATION RATE: 18 BRPM | WEIGHT: 160 LBS | HEART RATE: 63 BPM | BODY MASS INDEX: 24.25 KG/M2 | HEIGHT: 68 IN | TEMPERATURE: 97.5 F | SYSTOLIC BLOOD PRESSURE: 102 MMHG | DIASTOLIC BLOOD PRESSURE: 68 MMHG

## 2017-08-02 DIAGNOSIS — M79.89: Primary | ICD-10-CM

## 2017-08-02 LAB — COPATH REPORT: NORMAL

## 2017-08-02 PROCEDURE — 99024 POSTOP FOLLOW-UP VISIT: CPT | Performed by: SURGERY

## 2017-08-02 ASSESSMENT — PAIN SCALES - GENERAL: PAINLEVEL: NO PAIN (0)

## 2017-08-02 NOTE — NURSING NOTE
"Chief Complaint   Patient presents with     Surgical Followup     s/p- soft tissue mass excion right RLE 5/22/17       Initial /68 (BP Location: Right arm, Patient Position: Chair, Cuff Size: Adult Regular)  Pulse 63  Temp 97.5  F (36.4  C) (Tympanic)  Resp 18  Ht 5' 8\" (1.727 m)  Wt 160 lb (72.6 kg)  SpO2 99%  BMI 24.33 kg/m2 Estimated body mass index is 24.33 kg/(m^2) as calculated from the following:    Height as of this encounter: 5' 8\" (1.727 m).    Weight as of this encounter: 160 lb (72.6 kg).  Medication Reconciliation: complete   Mely Fernando    "

## 2017-08-02 NOTE — PATIENT INSTRUCTIONS
Thank you for allowing Dr. Jenkins and our surgical team to participate in your care.  If you have a scheduling or an appointment question please contact Margaret, our Health Unit Coordinator at her direct line [phone].   ALL nursing questions or concerns can be directed to your surgical nurse at: [phone]

## 2017-08-02 NOTE — MR AVS SNAPSHOT
After Visit Summary   8/2/2017    Luly Freeman    MRN: [id number]           Patient Information     Date Of Birth          1965        Visit Information        Provider Department      8/2/2017 2:15 PM Erma Jenkins MD [facility]        Care Instructions    Thank you for allowing Dr. Jenkins and our surgical team to participate in your care.  If you have a scheduling or an appointment question please contact Margaret, our Health Unit Coordinator at her direct line [phone].   ALL nursing questions or concerns can be directed to your surgical nurse at: [phone]-Yamilet          Follow-ups after your visit        Your next 10 appointments already scheduled     Aug 07, 2017  8:30 AM CDT   (Arrive by 8:15 AM)   SHORT with Tere Herrera MD   [facility] [place] ([facility] )    [address]  [place] [address] [phone]              Who to contact     If you have questions or need follow up information about today's clinic visit or your schedule please contact [facility] directly at [phone].  Normal or non-critical lab and imaging results will be communicated to you by MyChart, letter or phone within 4 business days after the clinic has received the results. If you do not hear from us within 7 days, please contact the clinic through [employer] or phone. If you have a critical or abnormal lab result, we will notify you by phone as soon as possible.  Submit refill requests through [employer] or call your pharmacy and they will forward the refill request to us. Please allow 3 business days for your refill to be completed.          Additional Information About Your Visit        MyChart Information     [employer] gives you secure access to your electronic health record. If you see a primary care provider, you can also send messages to your care team and make appointments. If you have questions, please call your primary care clinic.  If you  "do not have a primary care provider, please call [phone] and they will assist you.        Care EveryWhere ID     This is your Care EveryWhere ID. This could be used by other organizations to access your [place] medical records  [id number]        Your Vitals Were     Pulse Temperature Respirations Height Pulse Oximetry BMI (Body Mass Index)    63 97.5  F (36.4  C) (Tympanic) 18 5' 8\" (1.727 m) 99% 24.33 kg/m2       Blood Pressure from Last 3 Encounters:   08/02/17 102/68   07/31/17 108/60   06/05/17 120/60    Weight from Last 3 Encounters:   08/02/17 160 lb (72.6 kg)   07/31/17 160 lb (72.6 kg)   06/05/17 160 lb (72.6 kg)              Today, you had the following     No orders found for display       Primary Care Provider Office Phone # Fax #    Tere Herrera [phone]       [facility] [place] [address] [facility] [address]        Equal Access to Services     [facility]: Hadii dmitri ku hadasho Soshine, waaxda luqadaha, qaybta kaalmada adediogenes, suleman ng . So [facility] [place] [facility] [phone].    ATENCIÓN: Si habla español, tiene a morocho disposición servicios gratuitos de asistencia lingüística. Llame al [phone].    We comply with applicable federal civil rights laws and [place] laws. We do not discriminate on the basis of race, color, national origin, age, disability sex, sexual orientation or gender identity.            Thank you!     Thank you for choosing [facility] [place]  for your care. Our goal is always to provide you with excellent care. Hearing back from our patients is one way we can continue to improve our services. Please take a few minutes to complete the written survey that you may receive in the mail after your visit with us. Thank you!             Your Updated Medication List - Protect others around you: Learn how to safely use, store and throw away your medicines at www.disposemymeds.org.          This list is accurate as of: 8/2/17  " 2:19 PM.  Always use your most recent med list.                   Brand Name Dispense Instructions for use Diagnosis    albuterol 108 (90 BASE) MCG/ACT Inhaler   Generic drug:  albuterol     8.5 g    INHALE 2 PUFFS BY MOUTH EVERY 6 HOURS AS NEEDED FOR SHORTNESS OF BREATH    Acute bronchitis       EPINEPHrine 0.3 MG/0.3ML injection    AUVI-Q    2 each    Inject 0.3 mLs into the muscle once as needed for anaphylaxis for 1 dose.    Anaphylactic reaction       fexofenadine 180 MG tablet    ALLEGRA    30 tablet    Take 1 tablet by mouth every morning.        fluticasone 50 MCG/ACT spray    FLONASE     Spray 2 sprays into both nostrils daily        SUBLINGUAL IMMUNOTHERAPY (SLIT)     1 Bottle    Continue SLIT, 1 drop 3 x day (SL), following standard maintenance protocol.    Perennial allergic rhinitis

## 2017-08-02 NOTE — PROGRESS NOTES
"HPI  Returns following excision of a right lower leg lesion May 22, 2017.  A small subcutaneous knot was debrided and she believes additional suture may be present.  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    ROS  10 point ROS neg other than the symptoms noted above in the HPI.    Physical Exam  /68 (BP Location: Right arm, Patient Position: Chair, Cuff Size: Adult Regular)  Pulse 63  Temp 97.5  F (36.4  C) (Tympanic)  Resp 18  Ht 5' 8\" (1.727 m)  Wt 160 lb (72.6 kg)  SpO2 99%  BMI 24.33 kg/m2    1mm scab debrided.  Wound clean - remainder healed.   No suggestion of infection or complication.      Pathologic findings reviewed.  Return prn.    Erma Jenkins MD, FACS    8/2/2017  2:24 PM        "

## 2017-08-07 ENCOUNTER — OFFICE VISIT (OUTPATIENT)
Dept: FAMILY MEDICINE | Facility: OTHER | Age: 52
End: 2017-08-07
Attending: FAMILY MEDICINE
Payer: COMMERCIAL

## 2017-08-07 VITALS
BODY MASS INDEX: 24.25 KG/M2 | HEART RATE: 68 BPM | HEIGHT: 68 IN | RESPIRATION RATE: 12 BRPM | DIASTOLIC BLOOD PRESSURE: 60 MMHG | WEIGHT: 160 LBS | TEMPERATURE: 97.9 F | SYSTOLIC BLOOD PRESSURE: 98 MMHG

## 2017-08-07 DIAGNOSIS — Z23 IMMUNIZATION DUE: ICD-10-CM

## 2017-08-07 DIAGNOSIS — Z48.02 VISIT FOR SUTURE REMOVAL: Primary | ICD-10-CM

## 2017-08-07 PROCEDURE — 90715 TDAP VACCINE 7 YRS/> IM: CPT | Performed by: FAMILY MEDICINE

## 2017-08-07 PROCEDURE — 90471 IMMUNIZATION ADMIN: CPT | Performed by: FAMILY MEDICINE

## 2017-08-07 PROCEDURE — 99024 POSTOP FOLLOW-UP VISIT: CPT | Mod: 25 | Performed by: FAMILY MEDICINE

## 2017-08-07 ASSESSMENT — PATIENT HEALTH QUESTIONNAIRE - PHQ9
SUM OF ALL RESPONSES TO PHQ QUESTIONS 1-9: 1
5. POOR APPETITE OR OVEREATING: NOT AT ALL

## 2017-08-07 ASSESSMENT — PAIN SCALES - GENERAL: PAINLEVEL: NO PAIN (0)

## 2017-08-07 ASSESSMENT — ANXIETY QUESTIONNAIRES
5. BEING SO RESTLESS THAT IT IS HARD TO SIT STILL: NOT AT ALL
3. WORRYING TOO MUCH ABOUT DIFFERENT THINGS: NOT AT ALL
GAD7 TOTAL SCORE: 0
6. BECOMING EASILY ANNOYED OR IRRITABLE: NOT AT ALL
2. NOT BEING ABLE TO STOP OR CONTROL WORRYING: NOT AT ALL
7. FEELING AFRAID AS IF SOMETHING AWFUL MIGHT HAPPEN: NOT AT ALL
1. FEELING NERVOUS, ANXIOUS, OR ON EDGE: NOT AT ALL

## 2017-08-07 NOTE — PROGRESS NOTES
[facility]    Luly Freeman, 52 year old, female presents with   Chief Complaint   Patient presents with     Suture Removal     left leg, healing well, Pathology revealed a dermatofibroma, benign. She is also due for a tetanus update       PAST MEDICAL HISTORY:  Past Medical History:   Diagnosis Date     Allergic rhinitis 2011     Lump or mass in breast 2007     PONV (postoperative nausea and vomiting)      Recurrent sinusitis 2011     Varicose veins 2011       PAST SURGICAL HISTORY:  Past Surgical History:   Procedure Laterality Date     BUNIONECTOMY RT/LT  2008     ENT SURGERY  10/2013    nose surgery, Dr. Elliott     EXCISE MASS LOWER EXTREMITY Right 2017    Procedure: EXCISE MASS LOWER EXTREMITY;  EXCISION SOFT TISSUE MASS RIGHT LOWER LEG;  Surgeon: Erma Jenkins MD;  Location: [facility]            laparoscopic supracervical hysterectomy      Fibroids, ovaries intact     Left Breast Bx  3/2007    Fibrocystic breast disease     PE tube placement       ventilation tube, left         SOCIAL HISTORY  Social History     Social History     Marital status:      Spouse name: N/A     Number of children: N/A     Years of education: N/A     Occupational History     cosmotologist      Full-time     Social History Main Topics     Smoking status: Never Smoker     Smokeless tobacco: Never Used      Comment: no passive exposure     Alcohol use Yes      Comment: rarely     Drug use: No     Sexual activity: Not on file     Other Topics Concern     Caffeine Concern Yes     coffee, 1 cup daily     Parent/Sibling W/ Cabg, Mi Or Angioplasty Before 65f 55m? No     Social History Narrative       MEDICATIONS:  Prior to Admission medications    Medication Sig Start Date End Date Taking? Authorizing Provider   SUBLINGUAL IMMUNOTHERAPY, SLIT, Continue SLIT, 1 drop 3 x day (SL), following standard maintenance protocol. 17  Yes Mary Ashley PA-C   fluticasone  "(FLONASE) 50 MCG/ACT nasal spray Spray 2 sprays into both nostrils daily   Yes Reported, Patient   ALBUTEROL 108 (90 BASE) MCG/ACT inhaler INHALE 2 PUFFS BY MOUTH EVERY 6 HOURS AS NEEDED FOR SHORTNESS OF BREATH 6/13/16  Yes Tere Herrera MD   EPINEPHrine (AUVI-Q) 0.3 MG/0.3ML injection Inject 0.3 mLs into the muscle once as needed for anaphylaxis for 1 dose. 7/16/13  Yes Mary Ashley PA-C   fexofenadine (ALLEGRA) 180 MG tablet Take 1 tablet by mouth every morning. 7/1/13  Yes Ana Elliott MD       ALLERGIES:     Allergies   Allergen Reactions     Seasonal Allergies        ROS:  C: NEGATIVE for fever, chills, change in weight  I: NEGATIVE for worrisome rashes, moles or lesions  N: NEGATIVE for weakness, dizziness or paresthesias  P: NEGATIVE for changes in mood or affect    EXAM:  BP 98/60  Pulse 68  Temp 97.9  F (36.6  C) (Tympanic)  Resp 12  Ht 5' 8\" (1.727 m)  Wt 160 lb (72.6 kg)  BMI 24.33 kg/m2 Body mass index is 24.33 kg/(m^2).   GENERAL APPEARANCE: healthy, alert and no distress  SKIN: wound of the left leg is healing well, sutures removed, steristrips placed  NEURO: Normal strength and tone, mentation intact and speech normal  PSYCH: mentation appears normal and affect normal/bright    LABS AND IMAGING:     Results for orders placed or performed in visit on 07/31/17   SURGICAL PATHOLOGY EXAM [[id number]]   Result Value Ref Range    Copath Report       Patient Name: ANIYA RIVERA  MR#: [id number]  Specimen #: [id number]  Collected: 7/31/2017  Received: 8/1/2017  Reported: 8/2/2017 16:35  Ordering Phy(s): TERE HRERERA    For improved result formatting, select 'View Enhanced Report Format'  under Linked Documents section.    SPECIMEN(S):  Skin, thigh    FINAL DIAGNOSIS:  Skin, thigh, excision  - Dermatofibroma    Electronically signed out by:    Kwame Hdez M.D.    CLINICAL HISTORY:  Non healing skin leision [sic]; non healing surgical wound, initial  encounter.    GROSS:  There is a piece of le " skin which is 1 x 0.5 x 0.3 cm.  After the  margin is inked it is cross sectioned. (4TE in 1 block). (Dictated by:  Kwame Hdez MD 8/1/2017 05:29 PM)    MICROSCOPIC:  Microscopic sections show skin.  There is mild hyperkeratosis and solar  elastosis.  In the dermis beneath the area of hyperkeratosis there is a  proliferation of spindled cells.    CPT Codes  A: 41681-RQ9    TESTING LAB LOCATION:  [address] [address]  [phone]    COLLECTION SITE:  Client: [facility]  Location: [facility] (B)           ASSESSMENT/PLAN:  (Z48.02) Visit for suture removal  (primary encounter diagnosis)  Comment: benign wound  Plan: leave steristrips in place for one week, follow up for concerns    (Z23) Immunization due  Comment:   Plan: Tdap given today      Tere Herrera MD  August 7, 2017

## 2017-08-07 NOTE — MR AVS SNAPSHOT
"              After Visit Summary   8/7/2017    Luly Freeman    MRN: [id number]           Patient Information     Date Of Birth          1965        Visit Information        Provider Department      8/7/2017 8:30 AM Tere Herrera MD [facility] [place]        Today's Diagnoses     Visit for suture removal    -  1    Immunization due           Follow-ups after your visit        Who to contact     If you have questions or need follow up information about today's clinic visit or your schedule please contact [facility] [place] directly at [phone].  Normal or non-critical lab and imaging results will be communicated to you by MyChart, letter or phone within 4 business days after the clinic has received the results. If you do not hear from us within 7 days, please contact the clinic through [employer] or phone. If you have a critical or abnormal lab result, we will notify you by phone as soon as possible.  Submit refill requests through [employer] or call your pharmacy and they will forward the refill request to us. Please allow 3 business days for your refill to be completed.          Additional Information About Your Visit        MyChart Information     [employer] gives you secure access to your electronic health record. If you see a primary care provider, you can also send messages to your care team and make appointments. If you have questions, please call your primary care clinic.  If you do not have a primary care provider, please call [phone] and they will assist you.        Care EveryWhere ID     This is your Care EveryWhere ID. This could be used by other organizations to access your [place] medical records  [id number]        Your Vitals Were     Pulse Temperature Respirations Height BMI (Body Mass Index)       68 97.9  F (36.6  C) (Tympanic) 12 5' 8\" (1.727 m) 24.33 kg/m2        Blood Pressure from Last 3 Encounters:   08/07/17 98/60   08/02/17 102/68   07/31/17 108/60    Weight from Last 3 " Encounters:   08/07/17 160 lb (72.6 kg)   08/02/17 160 lb (72.6 kg)   07/31/17 160 lb (72.6 kg)              We Performed the Following     ADMIN 1st VACCINE     TDAP VACCINE (BOOSTRIX)        Primary Care Provider Office Phone # Fax #    Tere Herrera [phone]       [facility] [place] [address]  [place] [address]        Equal Access to Services     [facility]: Hadii aad ku hadasho Soomaali, waaxda luqadaha, qaybta kaalmada adeegyada, waxay idiin hayaan adeeg kharash la'aan . So [facility] [phone].    ATENCIÓN: Si habla español, tiene a morocho disposición servicios gratuitos de asistencia lingüística. [facility] [phone].    We comply with applicable federal civil rights laws and [place] laws. We do not discriminate on the basis of race, color, national origin, age, disability sex, sexual orientation or gender identity.            Thank you!     Thank you for choosing [facility] [place]  for your care. Our goal is always to provide you with excellent care. Hearing back from our patients is one way we can continue to improve our services. Please take a few minutes to complete the written survey that you may receive in the mail after your visit with us. Thank you!             Your Updated Medication List - Protect others around you: Learn how to safely use, store and throw away your medicines at www.disposemymeds.org.          This list is accurate as of: 8/7/17  9:12 AM.  Always use your most recent med list.                   Brand Name Dispense Instructions for use Diagnosis    albuterol 108 (90 BASE) MCG/ACT Inhaler   Generic drug:  albuterol     8.5 g    INHALE 2 PUFFS BY MOUTH EVERY 6 HOURS AS NEEDED FOR SHORTNESS OF BREATH    Acute bronchitis       EPINEPHrine 0.3 MG/0.3ML injection    AUVI-Q    2 each    Inject 0.3 mLs into the muscle once as needed for anaphylaxis for 1 dose.    Anaphylactic reaction       fexofenadine 180 MG tablet    ALLEGRA    30 tablet    Take 1  tablet by mouth every morning.        fluticasone 50 MCG/ACT spray    FLONASE     Spray 2 sprays into both nostrils daily        SUBLINGUAL IMMUNOTHERAPY (SLIT)     1 Bottle    Continue SLIT, 1 drop 3 x day (SL), following standard maintenance protocol.    Perennial allergic rhinitis

## 2017-08-07 NOTE — NURSING NOTE
"Chief Complaint   Patient presents with     Suture Removal     left eye       Initial BP 98/60  Pulse 68  Temp 97.9  F (36.6  C) (Tympanic)  Resp 12  Ht 5' 8\" (1.727 m)  Wt 160 lb (72.6 kg)  BMI 24.33 kg/m2 Estimated body mass index is 24.33 kg/(m^2) as calculated from the following:    Height as of this encounter: 5' 8\" (1.727 m).    Weight as of this encounter: 160 lb (72.6 kg).  Medication Reconciliation: complete     KIRBY SIMMONS      "

## 2017-08-08 ASSESSMENT — ANXIETY QUESTIONNAIRES: GAD7 TOTAL SCORE: 0

## 2017-08-14 ENCOUNTER — TELEPHONE (OUTPATIENT)
Dept: SURGERY | Facility: OTHER | Age: 52
End: 2017-08-14

## 2017-08-14 DIAGNOSIS — Z12.11 SCREENING FOR COLON CANCER: Primary | ICD-10-CM

## 2017-08-14 DIAGNOSIS — Z12.11 SPECIAL SCREENING FOR MALIGNANT NEOPLASMS, COLON: Primary | ICD-10-CM

## 2017-08-14 RX ORDER — SODIUM, POTASSIUM,MAG SULFATES 17.5-3.13G
2 SOLUTION, RECONSTITUTED, ORAL ORAL SEE ADMIN INSTRUCTIONS
Qty: 2 BOTTLE | Refills: 0 | Status: ON HOLD | OUTPATIENT
Start: 2017-08-14 | End: 2017-10-30

## 2017-08-14 NOTE — MR AVS SNAPSHOT
After Visit Summary   8/14/2017    Luly Freeman    MRN: [id number]           Patient Information     Date Of Birth          1965        Visit Information        Provider Department      8/14/2017 3:09 PM Erma Jenkins MD [facility] [place]        Care Instructions    Thank you for allowing Dr Jenkins and our surgical team to participate in your care.  If you have a scheduling or an appointment question please contact Margaret, our Health Unit Coordinator, at her direct line [phone].   ALL nursing questions or concerns can be directed to your surgical nurse at: [phone]-Yamilet    You are scheduled for a: Colonoscopy with possible biopsy   Your procedure date is: October 30, 2017    You have been ordered Suprep as your mechanical bowel prep. Please pick this up from your preferred pharmacy.     Bowel Prep    Clear liquid diet only on Sunday, October 29, 2017  the day before the examination.    Adame prep - one bottle at 6pm Sunday, October 29, 2017 the evening prior to the examination and follow with Two 16 ounce glasses water.    Adame prep - one bottle at 0400 on Monday, October 30, 2017 the day of the exam.  Follow with Two 16 ounce glasses of water.    Nothing by mouth after finishing the second 16 ounce glass of water the morning of the procedure.         HOW TO PREPARE-        You need a friend or family member available to drive you home AND stay with you for 4 hours after you leave the hospital. You will not be allowed to drive yourself. IF you need to take a taxi or the bus you MUST have a responsible person to ride with you. YOUR PROCEDURE WILL BE CANCELLED IF YOU DO NOT HAVE A RESPONSIBLE ADULT TO DRIVE YOU HOME.       You need to call our Surgery Education Nurses 1-2 weeks prior to your surgery date at [phone] or toll free [phone]. Please have you medication and allergy lists ready.       Stop your aspirin or other NSAIDs(Ibuprofen, Motrin, Aleve, Celebrex, Naproxen,  etc...) 7 days before your surgery.      Hospital admitting will call you the day before your surgery with your arrival time. If you are scheduled on a Monday admitting will call you the Friday before.      Please call your primary care physician if you should become ill within 24 hours of scheduled surgery. (ex.vomiting, diarrhea, fever)            Follow-ups after your visit        Who to contact     If you have questions or need follow up information about today's clinic visit or your schedule please contact [facility] directly at [phone].  Normal or non-critical lab and imaging results will be communicated to you by [employer], letter or phone within 4 business days after the clinic has received the results. If you do not hear from us within 7 days, please contact the clinic through [employer] or phone. If you have a critical or abnormal lab result, we will notify you by phone as soon as possible.  Submit refill requests through [employer] or call your pharmacy and they will forward the refill request to us. Please allow 3 business days for your refill to be completed.          Additional Information About Your Visit        [employer] Information     [employer] gives you secure access to your electronic health record. If you see a primary care provider, you can also send messages to your care team and make appointments. If you have questions, please call your primary care clinic.  If you do not have a primary care provider, please call [phone] and they will assist you.        Care EveryWhere ID     This is your Care EveryWhere ID. This could be used by other organizations to access your [facility] medical records  [id number]         Blood Pressure from Last 3 Encounters:   08/07/17 98/60   08/02/17 102/68   07/31/17 108/60    Weight from Last 3 Encounters:   08/07/17 160 lb (72.6 kg)   08/02/17 160 lb (72.6 kg)   07/31/17 160 lb (72.6 kg)              Today, you had the following     No orders found for  display       Primary Care Provider Office Phone # Fax #    Tere Herrera [phone]       [facility] [place] [address]  [place] [address]        Equal Access to Services     ORION ALATORRE : Hadii dmitri ku hadsandyo Soskipali, waaxda luqadaha, qaybta kaalmada adeegyada, suleman migueln alejandrinaelsie hu hernandez thomas. So [facility] [phone].    ATENCIÓN: Si habla español, tiene a morocho disposición servicios gratuitos de asistencia lingüística. Llame al [phone].    We comply with applicable federal civil rights laws and [place] laws. We do not discriminate on the basis of race, color, national origin, age, disability sex, sexual orientation or gender identity.            Thank you!     Thank you for choosing [facility]  for your care. Our goal is always to provide you with excellent care. Hearing back from our patients is one way we can continue to improve our services. Please take a few minutes to complete the written survey that you may receive in the mail after your visit with us. Thank you!             Your Updated Medication List - Protect others around you: Learn how to safely use, store and throw away your medicines at www.disposemymeds.org.          This list is accurate as of: 8/14/17  3:14 PM.  Always use your most recent med list.                   Brand Name Dispense Instructions for use Diagnosis    albuterol 108 (90 BASE) MCG/ACT Inhaler   Generic drug:  albuterol     8.5 g    INHALE 2 PUFFS BY MOUTH EVERY 6 HOURS AS NEEDED FOR SHORTNESS OF BREATH    Acute bronchitis       EPINEPHrine 0.3 MG/0.3ML injection    AUVI-Q    2 each    Inject 0.3 mLs into the muscle once as needed for anaphylaxis for 1 dose.    Anaphylactic reaction       fexofenadine 180 MG tablet    ALLEGRA    30 tablet    Take 1 tablet by mouth every morning.        fluticasone 50 MCG/ACT spray    FLONASE     Spray 2 sprays into both nostrils daily        SUBLINGUAL IMMUNOTHERAPY (SLIT)     1 Bottle     Continue SLIT, 1 drop 3 x day (SL), following standard maintenance protocol.    Perennial allergic rhinitis

## 2017-08-14 NOTE — PATIENT INSTRUCTIONS
Thank you for allowing Dr Jenkins and our surgical team to participate in your care.  If you have a scheduling or an appointment question please contact Margaret, our Health Unit Coordinator, at her direct line [phone].   ALL nursing questions or concerns can be directed to your surgical nurse at: [phone]    You are scheduled for a: Colonoscopy with possible biopsy   Your procedure date is: October 30, 2017    You have been ordered Suprep as your mechanical bowel prep. Please pick this up from your preferred pharmacy.     Bowel Prep    Clear liquid diet only on Sunday, October 29, 2017  the day before the examination.    Adame prep - one bottle at 6pm Sunday, October 29, 2017 the evening prior to the examination and follow with Two 16 ounce glasses water.    Adame prep - one bottle at 0400 on Monday, October 30, 2017 the day of the exam.  Follow with Two 16 ounce glasses of water.    Nothing by mouth after finishing the second 16 ounce glass of water the morning of the procedure.         HOW TO PREPARE-        You need a friend or family member available to drive you home AND stay with you for 4 hours after you leave the hospital. You will not be allowed to drive yourself. IF you need to take a taxi or the bus you MUST have a responsible person to ride with you. YOUR PROCEDURE WILL BE CANCELLED IF YOU DO NOT HAVE A RESPONSIBLE ADULT TO DRIVE YOU HOME.       You need to call our Surgery Education Nurses 1-2 weeks prior to your surgery date at [phone] or toll free [phone]. Please have you medication and allergy lists ready.       Stop your aspirin or other NSAIDs(Ibuprofen, Motrin, Aleve, Celebrex, Naproxen, etc...) 7 days before your surgery.      Hospital admitting will call you the day before your surgery with your arrival time. If you are scheduled on a Monday admitting will call you the Friday before.      Please call your primary care physician if you should become ill within 24 hours of  scheduled surgery. (ex.vomiting, diarrhea, fever)

## 2017-08-14 NOTE — TELEPHONE ENCOUNTER
Patient returned the clinic surgical nurses phone call and wishes to schedule her colonoscopy with Dr Jenkins on Monday October 30, 2017.  Patient was approved by the surgery education department for a meet and greet colonoscopy.  A packet is sent to the patient with the surgical handbook, information for the bowel prep, and direct lines to the surgical nurses in the clinic in case of any questions.

## 2017-09-11 ENCOUNTER — TELEPHONE (OUTPATIENT)
Dept: ALLERGY | Facility: OTHER | Age: 52
End: 2017-09-11

## 2017-09-18 NOTE — TELEPHONE ENCOUNTER
Patient called and left message.  Attempted to contact patient at both home and cell phone.  Left message.

## 2017-09-18 NOTE — TELEPHONE ENCOUNTER
Patient returned call.  Patient states drops are going well.  She believes her epi pen is current, but will double check when she gets home.  Advised patient she can get a refill through her pharmacy if needed.  Also reminded patient she is due for a follow-up with NAZANIN Tucker.  Patient states she will call to schedule once she is at home.      Zahira Sharma RN

## 2017-09-19 ENCOUNTER — TELEPHONE (OUTPATIENT)
Dept: ALLERGY | Facility: OTHER | Age: 52
End: 2017-09-19

## 2017-09-19 ENCOUNTER — MEDICAL CORRESPONDENCE (OUTPATIENT)
Dept: HEALTH INFORMATION MANAGEMENT | Facility: HOSPITAL | Age: 52
End: 2017-09-19

## 2017-09-19 DIAGNOSIS — J30.89 PERENNIAL ALLERGIC RHINITIS: ICD-10-CM

## 2017-10-30 ENCOUNTER — SURGERY (OUTPATIENT)
Age: 52
End: 2017-10-30

## 2017-10-30 ENCOUNTER — ANESTHESIA EVENT (OUTPATIENT)
Dept: SURGERY | Facility: HOSPITAL | Age: 52
End: 2017-10-30
Payer: COMMERCIAL

## 2017-10-30 ENCOUNTER — ANESTHESIA (OUTPATIENT)
Dept: SURGERY | Facility: HOSPITAL | Age: 52
End: 2017-10-30
Payer: COMMERCIAL

## 2017-10-30 ENCOUNTER — HOSPITAL ENCOUNTER (OUTPATIENT)
Facility: HOSPITAL | Age: 52
Discharge: HOME | End: 2017-10-30
Attending: SURGERY | Admitting: SURGERY
Payer: COMMERCIAL

## 2017-10-30 VITALS
TEMPERATURE: 97.1 F | DIASTOLIC BLOOD PRESSURE: 71 MMHG | BODY MASS INDEX: 24.36 KG/M2 | HEART RATE: 77 BPM | OXYGEN SATURATION: 100 % | RESPIRATION RATE: 18 BRPM | WEIGHT: 160.2 LBS | SYSTOLIC BLOOD PRESSURE: 98 MMHG

## 2017-10-30 PROCEDURE — 71000027 ZZH RECOVERY PHASE 2 EACH 15 MINS: Performed by: SURGERY

## 2017-10-30 PROCEDURE — 25000128 H RX IP 250 OP 636: Performed by: NURSE ANESTHETIST, CERTIFIED REGISTERED

## 2017-10-30 PROCEDURE — 37000009 ZZH ANESTHESIA TECHNICAL FEE, EACH ADDTL 15 MIN: Performed by: SURGERY

## 2017-10-30 PROCEDURE — G0121 COLON CA SCRN NOT HI RSK IND: HCPCS | Performed by: SURGERY

## 2017-10-30 PROCEDURE — 01999 UNLISTED ANES PROCEDURE: CPT | Performed by: NURSE ANESTHETIST, CERTIFIED REGISTERED

## 2017-10-30 PROCEDURE — 37000008 ZZH ANESTHESIA TECHNICAL FEE, 1ST 30 MIN: Performed by: SURGERY

## 2017-10-30 PROCEDURE — 45378 DIAGNOSTIC COLONOSCOPY: CPT | Mod: 33 | Performed by: ANESTHESIOLOGY

## 2017-10-30 PROCEDURE — 25000128 H RX IP 250 OP 636: Performed by: ANESTHESIOLOGY

## 2017-10-30 PROCEDURE — 36000050 ZZH SURGERY LEVEL 2 1ST 30 MIN: Performed by: SURGERY

## 2017-10-30 PROCEDURE — 40000305 ZZH STATISTIC PRE PROC ASSESS I: Performed by: SURGERY

## 2017-10-30 RX ORDER — FENTANYL CITRATE 50 UG/ML
INJECTION, SOLUTION INTRAMUSCULAR; INTRAVENOUS PRN
Status: DISCONTINUED | OUTPATIENT
Start: 2017-10-30 | End: 2017-10-30

## 2017-10-30 RX ORDER — ONDANSETRON 2 MG/ML
4 INJECTION INTRAMUSCULAR; INTRAVENOUS EVERY 30 MIN PRN
Status: DISCONTINUED | OUTPATIENT
Start: 2017-10-30 | End: 2017-10-30 | Stop reason: HOSPADM

## 2017-10-30 RX ORDER — DEXAMETHASONE SODIUM PHOSPHATE 10 MG/ML
INJECTION, SOLUTION INTRAMUSCULAR; INTRAVENOUS PRN
Status: DISCONTINUED | OUTPATIENT
Start: 2017-10-30 | End: 2017-10-30

## 2017-10-30 RX ORDER — HYDRALAZINE HYDROCHLORIDE 20 MG/ML
2.5-5 INJECTION INTRAMUSCULAR; INTRAVENOUS EVERY 10 MIN PRN
Status: DISCONTINUED | OUTPATIENT
Start: 2017-10-30 | End: 2017-10-30 | Stop reason: HOSPADM

## 2017-10-30 RX ORDER — ALBUTEROL SULFATE 0.83 MG/ML
2.5 SOLUTION RESPIRATORY (INHALATION) EVERY 4 HOURS PRN
Status: DISCONTINUED | OUTPATIENT
Start: 2017-10-30 | End: 2017-10-30 | Stop reason: HOSPADM

## 2017-10-30 RX ORDER — KETOROLAC TROMETHAMINE 30 MG/ML
30 INJECTION, SOLUTION INTRAMUSCULAR; INTRAVENOUS EVERY 6 HOURS PRN
Status: DISCONTINUED | OUTPATIENT
Start: 2017-10-30 | End: 2017-10-30 | Stop reason: HOSPADM

## 2017-10-30 RX ORDER — ONDANSETRON 4 MG/1
4 TABLET, ORALLY DISINTEGRATING ORAL EVERY 30 MIN PRN
Status: DISCONTINUED | OUTPATIENT
Start: 2017-10-30 | End: 2017-10-30 | Stop reason: HOSPADM

## 2017-10-30 RX ORDER — SODIUM CHLORIDE, SODIUM LACTATE, POTASSIUM CHLORIDE, CALCIUM CHLORIDE 600; 310; 30; 20 MG/100ML; MG/100ML; MG/100ML; MG/100ML
INJECTION, SOLUTION INTRAVENOUS CONTINUOUS
Status: DISCONTINUED | OUTPATIENT
Start: 2017-10-30 | End: 2017-10-30 | Stop reason: HOSPADM

## 2017-10-30 RX ORDER — PROMETHAZINE HYDROCHLORIDE 25 MG/ML
12.5 INJECTION, SOLUTION INTRAMUSCULAR; INTRAVENOUS
Status: DISCONTINUED | OUTPATIENT
Start: 2017-10-30 | End: 2017-10-30 | Stop reason: HOSPADM

## 2017-10-30 RX ORDER — PROPOFOL 10 MG/ML
INJECTION, EMULSION INTRAVENOUS PRN
Status: DISCONTINUED | OUTPATIENT
Start: 2017-10-30 | End: 2017-10-30

## 2017-10-30 RX ORDER — DEXAMETHASONE SODIUM PHOSPHATE 4 MG/ML
4 INJECTION, SOLUTION INTRA-ARTICULAR; INTRALESIONAL; INTRAMUSCULAR; INTRAVENOUS; SOFT TISSUE EVERY 10 MIN PRN
Status: DISCONTINUED | OUTPATIENT
Start: 2017-10-30 | End: 2017-10-30 | Stop reason: HOSPADM

## 2017-10-30 RX ORDER — FENTANYL CITRATE 50 UG/ML
25-50 INJECTION, SOLUTION INTRAMUSCULAR; INTRAVENOUS
Status: DISCONTINUED | OUTPATIENT
Start: 2017-10-30 | End: 2017-10-30 | Stop reason: HOSPADM

## 2017-10-30 RX ORDER — NALOXONE HYDROCHLORIDE 0.4 MG/ML
.1-.4 INJECTION, SOLUTION INTRAMUSCULAR; INTRAVENOUS; SUBCUTANEOUS
Status: DISCONTINUED | OUTPATIENT
Start: 2017-10-30 | End: 2017-10-30 | Stop reason: HOSPADM

## 2017-10-30 RX ORDER — MEPERIDINE HYDROCHLORIDE 25 MG/ML
12.5 INJECTION INTRAMUSCULAR; INTRAVENOUS; SUBCUTANEOUS
Status: DISCONTINUED | OUTPATIENT
Start: 2017-10-30 | End: 2017-10-30 | Stop reason: HOSPADM

## 2017-10-30 RX ORDER — LABETALOL HYDROCHLORIDE 5 MG/ML
10 INJECTION, SOLUTION INTRAVENOUS
Status: DISCONTINUED | OUTPATIENT
Start: 2017-10-30 | End: 2017-10-30 | Stop reason: HOSPADM

## 2017-10-30 RX ORDER — ONDANSETRON 2 MG/ML
INJECTION INTRAMUSCULAR; INTRAVENOUS PRN
Status: DISCONTINUED | OUTPATIENT
Start: 2017-10-30 | End: 2017-10-30

## 2017-10-30 RX ADMIN — PROPOFOL 10 MG: 10 INJECTION, EMULSION INTRAVENOUS at 08:50

## 2017-10-30 RX ADMIN — PROPOFOL 20 MG: 10 INJECTION, EMULSION INTRAVENOUS at 08:39

## 2017-10-30 RX ADMIN — SODIUM CHLORIDE, POTASSIUM CHLORIDE, SODIUM LACTATE AND CALCIUM CHLORIDE: 600; 310; 30; 20 INJECTION, SOLUTION INTRAVENOUS at 07:15

## 2017-10-30 RX ADMIN — DEXAMETHASONE SODIUM PHOSPHATE 10 MG: 10 INJECTION, SOLUTION INTRAMUSCULAR; INTRAVENOUS at 08:38

## 2017-10-30 RX ADMIN — FENTANYL CITRATE 100 MCG: 50 INJECTION, SOLUTION INTRAMUSCULAR; INTRAVENOUS at 08:30

## 2017-10-30 RX ADMIN — SODIUM CHLORIDE, POTASSIUM CHLORIDE, SODIUM LACTATE AND CALCIUM CHLORIDE: 600; 310; 30; 20 INJECTION, SOLUTION INTRAVENOUS at 07:19

## 2017-10-30 RX ADMIN — PROPOFOL 20 MG: 10 INJECTION, EMULSION INTRAVENOUS at 08:36

## 2017-10-30 RX ADMIN — PROPOFOL 30 MG: 10 INJECTION, EMULSION INTRAVENOUS at 08:35

## 2017-10-30 RX ADMIN — PROPOFOL 20 MG: 10 INJECTION, EMULSION INTRAVENOUS at 08:38

## 2017-10-30 RX ADMIN — PROPOFOL 20 MG: 10 INJECTION, EMULSION INTRAVENOUS at 08:37

## 2017-10-30 RX ADMIN — PROPOFOL 10 MG: 10 INJECTION, EMULSION INTRAVENOUS at 08:40

## 2017-10-30 RX ADMIN — ONDANSETRON 4 MG: 2 INJECTION INTRAMUSCULAR; INTRAVENOUS at 08:38

## 2017-10-30 RX ADMIN — PROPOFOL 10 MG: 10 INJECTION, EMULSION INTRAVENOUS at 08:45

## 2017-10-30 RX ADMIN — MIDAZOLAM HYDROCHLORIDE 2 MG: 1 INJECTION, SOLUTION INTRAMUSCULAR; INTRAVENOUS at 08:30

## 2017-10-30 NOTE — ANESTHESIA POSTPROCEDURE EVALUATION
Patient: Luly Freeman    Procedure(s):  WHOLE COLON COLONOSCOPY  - Wound Class: II-Clean Contaminated    Diagnosis:SCREENING FOR COLON CANCER  Diagnosis Additional Information: No value filed.    Anesthesia Type:  MAC    Note:  Anesthesia Post Evaluation    Patient location during evaluation: Phase 2 and Bedside  Patient participation: Able to fully participate in evaluation  Level of consciousness: awake and alert  Pain management: adequate  Airway patency: patent  Cardiovascular status: acceptable  Respiratory status: acceptable  Hydration status: stable  PONV: none     Anesthetic complications: None          Last vitals:  Vitals:    10/30/17 0940 10/30/17 0945 10/30/17 0950   BP: 104/69 104/72 98/71   Pulse:      Resp: 18  18   Temp:      SpO2: 98% 98% 100%         Electronically Signed By: Ravi Leija MD  October 30, 2017  11:33 AM

## 2017-10-30 NOTE — IP AVS SNAPSHOT
[facility]    [address] [address]    Phone:  [phone]                                       After Visit Summary   10/30/2017    Luly Freeman    MRN: [id number]           After Visit Summary Signature Page     I have received my discharge instructions, and my questions have been answered. I have discussed any challenges I see with this plan with the nurse or doctor.    ..........................................................................................................................................  Patient/Patient Representative Signature      ..........................................................................................................................................  Patient Representative Print Name and Relationship to Patient    ..................................................               ................................................  Date                                            Time    ..........................................................................................................................................  Reviewed by Signature/Title    ...................................................              ..............................................  Date                                                            Time

## 2017-10-30 NOTE — CONSULTS
[facility] Surgery Consultation    CC:  Screening colonoscopy    HPI:  This 52 year old year old female is seen at the request of Dr. Herrera for evaluation and performance of her first screening colonoscopy.  She is asymptomatic and without family history of colon malignancy.    The patient admits to extreme anxiety regarding the procedure and, for this reason, monitored anesthetic care will be required to safely perform the procedure and reduce risk of adverse occurrence, perforation, etc.    Past Medical History:   Diagnosis Date     Allergic rhinitis 2011     Lump or mass in breast 2007     PONV (postoperative nausea and vomiting)      Recurrent sinusitis 2011     Varicose veins 2011     Past Surgical History:   Procedure Laterality Date     BUNIONECTOMY RT/LT  2008     ENT SURGERY  10/2013    nose surgery, Dr. Elliott     EXCISE MASS LOWER EXTREMITY Right 2017    Procedure: EXCISE MASS LOWER EXTREMITY;  EXCISION SOFT TISSUE MASS RIGHT LOWER LEG;  Surgeon: Erma Jenkins MD;  Location: [facility] OR            laparoscopic supracervical hysterectomy      Fibroids, ovaries intact     Left Breast Bx  3/2007    Fibrocystic breast disease     PE tube placement       ventilation tube, left       Current Facility-Administered Medications   Medication     lactated ringers infusion     lidocaine 1 % 1 mL     Allergies   Allergen Reactions     Seasonal Allergies      HABITS:    Social History   Substance Use Topics     Smoking status: Never Smoker     Smokeless tobacco: Never Used      Comment: no passive exposure     Alcohol use Yes      Comment: rarely       Family History   Problem Relation Age of Onset     C.A.D. Father      DIABETES No family hx of      CEREBROVASCULAR DISEASE No family hx of        REVIEW OF SYSTEMS:  Ten point review of systems negative except those mentioned in the HPI.     OBJECTIVE:    /69  Pulse 77  Temp 97.1  F (36.2  C) (Oral)  Resp  18  Wt 72.7 kg (160 lb 3.2 oz)  SpO2 98%  BMI 24.36 kg/m2    GENERAL: Generally appears well, in no distress with appropriate affect.  HEENT:   Sclerae anicteric - No cervical, supra/infraclavciular lymphadenopathy, no thyroid masses  Respiratory:  Lungs clear to ausculation bilaterally with good air excursion  Cardiovascular:  Regular Rate and Rhythm with no murmurs gallops or rubs, normal   :  deferred  Extremities:  Extremities normal. No deformities, edema, or skin discoloration.  Skin:  no suspicious lesions or rashes  Neurological: grossly intact    Psych:  Alert, oriented, affect appropriate with normal decision making ability.    IMPRESSION:  Satisfactory candidate for colonoscopy.  Suprep bowel prep complete.  The indications, risks, benefits and technical aspects of whole colon colonoscopy were outlined with risks including, but not limited to, perforation, bleeding and inability to visualize entire colon.  Management of each was reviewed.  The need of mechanical preparation of the colon was reviewed along with the use of monitored anesthetic care.  The patient's questions were asked and answered.  Scheduled first available date.    PLAN:  Will proceed under monitored anesthetic care.    Erma Jenkins MD, FACS    10/30/2017  8:27 AM

## 2017-10-30 NOTE — ANESTHESIA CARE TRANSFER NOTE
Patient: Luly Freeman    Procedure(s):  WHOLE COLON COLONOSCOPY  - Wound Class: II-Clean Contaminated    Diagnosis: SCREENING FOR COLON CANCER  Diagnosis Additional Information: No value filed.    Anesthesia Type:   MAC     Note:  Airway :Nasal Cannula  Patient transferred to:Phase II  Handoff Report: Identifed the Patient, Identified the Reponsible Provider, Reviewed the pertinent medical history, Discussed the surgical course, Reviewed Intra-OP anesthesia mangement and issues during anesthesia, Set expectations for post-procedure period and Allowed opportunity for questions and acknowledgement of understanding      Vitals: (Last set prior to Anesthesia Care Transfer)    CRNA VITALS  10/30/2017 0831 - 10/30/2017 0907      10/30/2017             Ht Rate: 65                Electronically Signed By: JORDYN Calvin CRNA  October 30, 2017  9:07 AM

## 2017-10-30 NOTE — IP AVS SNAPSHOT
MRN:[id number]                      After Visit Summary   10/30/2017    Luly Freeman    MRN: [id number]           Thank you!     Thank you for choosing [facility] for your care. Our goal is always to provide you with excellent care. Hearing back from our patients is one way we can continue to improve our services. Please take a few minutes to complete the written survey that you may receive in the mail after you visit with us. Thank you!        Patient Information     Date Of Birth          1965        About your hospital stay     You were admitted on:  October 30, 2017 You last received care in the:  [facility] Preop/Phase II    You were discharged on:  October 30, 2017       Who to Call     For medical emergencies, please call 911.  For non-urgent questions about your medical care, please call your primary care provider or clinic, [phone]  For questions related to your surgery, please call your surgery clinic        Attending Provider     Provider Specialty    Erma Jenkins MD General Surgery       Primary Care Provider Office Phone # Fax #    Tere Herrera [phone]      Further instructions from your care team           INSTRUCTIONS AFTER COLONOSCOPY    WHEN YOU ARE BACK HOME:    Plan to rest for an hour or two after you get home.    You may have some cramping or pressure until you pass gas.    You may resume your regular medications.    Eat a small, light meal at first, and then gradually return to normal meal sizes.  If you had a polyp removed:    Slight bleeding may occur.  You may have a slight blood stain on the toilet paper after a bowel movement.    To lessen the chance of bleeding, avoid heavy exercise for ONE WEEK.  This includes heavy lifting, vigorous sport activities, and heavy physical labor.  You may resume your normal sexual activity.      Avoid aspirin or aspirin products if instructed by your doctor.    WHAT TO WATCH FOR:  Problems rarely occur after  the exam; however, it is important for you to watch for early signs of possible problems.  If you have     Unusual pain in your abdomen    Nausea and vomiting that persists    Excessive bleeding    Black or bloody bowel movements    Fever or temperature above 100.6 F  Please call your doctor ([facility] [phone]) or go to the nearest hospital emergency room.    Post-Anesthesia Patient Instructions    IMMEDIATELY FOLLOWING SURGERY:  Do not drive or operate machinery for the first twenty four hours after surgery.  Do not make any important decisions for twenty four hours after surgery or while taking narcotic pain medications or sedatives.  If you develop intractable nausea and vomiting or a severe headache please notify your doctor immediately.    FOLLOW-UP:  Please make an appointment with your surgeon as instructed. You do not need to follow up with anesthesia unless specifically instructed to do so.    WOUND CARE INSTRUCTIONS (if applicable):  Keep a dry clean dressing on the anesthesia/puncture wound site if there is drainage.  Once the wound has quit draining you may leave it open to air.  Generally you should leave the bandage intact for twenty four hours unless there is drainage.  If the epidural site drains for more than 36-48 hours please call the anesthesia department.    QUESTIONS?:  Please feel free to call your physician or the hospital  if you have any questions, and they will be happy to assist you.       INSTRUCTIONS AFTER COLONOSCOPY    WHEN YOU ARE BACK HOME:    Plan to rest for an hour or two after you get home.    You may have some cramping or pressure until you pass gas.    You may resume your regular medications.    Eat a small, light meal at first, and then gradually return to normal meal sizes.  If you had a polyp removed:    Slight bleeding may occur.  You may have a slight blood stain on the toilet paper after a bowel movement.    To lessen the chance of bleeding, avoid heavy  exercise for ONE WEEK.  This includes heavy lifting, vigorous sport activities, and heavy physical labor.  You may resume your normal sexual activity.      Avoid aspirin or aspirin products if instructed by your doctor.    WHAT TO WATCH FOR:  Problems rarely occur after the exam; however, it is important for you to watch for early signs of possible problems.  If you have     Unusual pain in your abdomen    Nausea and vomiting that persists    Excessive bleeding    Black or bloody bowel movements    Fever or temperature above 100.6 F  Please call your doctor ([facility] [phone]) or go to the nearest hospital emergency room.    Post-Anesthesia Patient Instructions    IMMEDIATELY FOLLOWING SURGERY:  Do not drive or operate machinery for the first twenty four hours after surgery.  Do not make any important decisions for twenty four hours after surgery or while taking narcotic pain medications or sedatives.  If you develop intractable nausea and vomiting or a severe headache please notify your doctor immediately.    FOLLOW-UP:  Please make an appointment with your surgeon as instructed. You do not need to follow up with anesthesia unless specifically instructed to do so.    WOUND CARE INSTRUCTIONS (if applicable):  Keep a dry clean dressing on the anesthesia/puncture wound site if there is drainage.  Once the wound has quit draining you may leave it open to air.  Generally you should leave the bandage intact for twenty four hours unless there is drainage.  If the epidural site drains for more than 36-48 hours please call the anesthesia department.    QUESTIONS?:  Please feel free to call your physician or the hospital  if you have any questions, and they will be happy to assist you.       Pending Results     No orders found from 10/28/2017 to 10/31/2017.            Admission Information     Date & Time Provider Department Dept. Phone    10/30/2017 Erma Jenkins MD HI Preop/Phase [phone]       Your Vitals Were     Blood Pressure Pulse Temperature Respirations Weight Pulse Oximetry    98/71 77 97.1  F (36.2  C) (Oral) 18 72.7 kg (160 lb 3.2 oz) 100%    BMI (Body Mass Index)                   24.36 kg/m2           [employer] Information     [employer] gives you secure access to your electronic health record. If you see a primary care provider, you can also send messages to your care team and make appointments. If you have questions, please call your primary care clinic.  If you do not have a primary care provider, please call [phone] and they will assist you.        Care EveryWhere ID     This is your Care EveryWhere ID. This could be used by other organizations to access your [facility] medical records  [id number]        Equal Access to Services     ORION ALATORRE : Stefania Dodd, ana joseph, geronimo benitez, suleman thomas. So [facility] [phone].    ATENCIÓN: Si habla español, tiene a morocho disposición servicios gratuitos de asistencia lingüística. Llame al [phone].    We comply with applicable federal civil rights laws and [place] laws. We do not discriminate on the basis of race, color, national origin, age, disability, sex, sexual orientation, or gender identity.               Review of your medicines      CONTINUE these medicines which have NOT CHANGED        Dose / Directions    EPINEPHrine 0.3 MG/0.3ML injection   Commonly known as:  AUVI-Q   Used for:  Anaphylactic reaction        Dose:  0.3 mg   Inject 0.3 mLs into the muscle once as needed for anaphylaxis for 1 dose.   Quantity:  2 each   Refills:  2       fexofenadine 180 MG tablet   Commonly known as:  ALLEGRA        Dose:  180 mg   Take 1 tablet by mouth every morning.   Quantity:  30 tablet   Refills:  11       fluticasone 50 MCG/ACT spray   Commonly known as:  FLONASE        Dose:  2 spray   Spray 2 sprays into both nostrils daily   Refills:  0       PROAIR  (90 BASE) MCG/ACT  Inhaler   Used for:  Acute bronchitis   Generic drug:  albuterol        INHALE 2 PUFFS BY MOUTH EVERY 6 HOURS AS NEEDED FOR SHORTNESS OF BREATH   Quantity:  8.5 g   Refills:  0       SUBLINGUAL IMMUNOTHERAPY (SLIT)   Used for:  Perennial allergic rhinitis        Continue SLIT, 1 drop 3 x day (SL) for 1 vial, then 1 drop 2 x day (SL) for 1 vial, then 1 drop once daily (SL) for 1 vial, then discontinue.   Quantity:  1 Bottle   Refills:  2                Protect others around you: Learn how to safely use, store and throw away your medicines at www.disposemymeds.org.             Medication List: This is a list of all your medications and when to take them. Check marks below indicate your daily home schedule. Keep this list as a reference.      Medications           Morning Afternoon Evening Bedtime As Needed    EPINEPHrine 0.3 MG/0.3ML injection   Commonly known as:  AUVI-Q   Inject 0.3 mLs into the muscle once as needed for anaphylaxis for 1 dose.                                fexofenadine 180 MG tablet   Commonly known as:  ALLEGRA   Take 1 tablet by mouth every morning.                                fluticasone 50 MCG/ACT spray   Commonly known as:  FLONASE   Spray 2 sprays into both nostrils daily                                PROAIR  (90 BASE) MCG/ACT Inhaler   INHALE 2 PUFFS BY MOUTH EVERY 6 HOURS AS NEEDED FOR SHORTNESS OF BREATH   Generic drug:  albuterol                                SUBLINGUAL IMMUNOTHERAPY (SLIT)   Continue SLIT, 1 drop 3 x day (SL) for 1 vial, then 1 drop 2 x day (SL) for 1 vial, then 1 drop once daily (SL) for 1 vial, then discontinue.

## 2017-10-30 NOTE — DISCHARGE INSTRUCTIONS
INSTRUCTIONS AFTER COLONOSCOPY    WHEN YOU ARE BACK HOME:    Plan to rest for an hour or two after you get home.    You may have some cramping or pressure until you pass gas.    You may resume your regular medications.    Eat a small, light meal at first, and then gradually return to normal meal sizes.  If you had a polyp removed:    Slight bleeding may occur.  You may have a slight blood stain on the toilet paper after a bowel movement.    To lessen the chance of bleeding, avoid heavy exercise for ONE WEEK.  This includes heavy lifting, vigorous sport activities, and heavy physical labor.  You may resume your normal sexual activity.      Avoid aspirin or aspirin products if instructed by your doctor.    WHAT TO WATCH FOR:  Problems rarely occur after the exam; however, it is important for you to watch for early signs of possible problems.  If you have     Unusual pain in your abdomen    Nausea and vomiting that persists    Excessive bleeding    Black or bloody bowel movements    Fever or temperature above 100.6 F  Please call your doctor ([facility] [phone]) or go to the nearest hospital emergency room.    Post-Anesthesia Patient Instructions    IMMEDIATELY FOLLOWING SURGERY:  Do not drive or operate machinery for the first twenty four hours after surgery.  Do not make any important decisions for twenty four hours after surgery or while taking narcotic pain medications or sedatives.  If you develop intractable nausea and vomiting or a severe headache please notify your doctor immediately.    FOLLOW-UP:  Please make an appointment with your surgeon as instructed. You do not need to follow up with anesthesia unless specifically instructed to do so.    WOUND CARE INSTRUCTIONS (if applicable):  Keep a dry clean dressing on the anesthesia/puncture wound site if there is drainage.  Once the wound has quit draining you may leave it open to air.  Generally you should leave the bandage intact for twenty four  hours unless there is drainage.  If the epidural site drains for more than 36-48 hours please call the anesthesia department.    QUESTIONS?:  Please feel free to call your physician or the hospital  if you have any questions, and they will be happy to assist you.       INSTRUCTIONS AFTER COLONOSCOPY    WHEN YOU ARE BACK HOME:    Plan to rest for an hour or two after you get home.    You may have some cramping or pressure until you pass gas.    You may resume your regular medications.    Eat a small, light meal at first, and then gradually return to normal meal sizes.  If you had a polyp removed:    Slight bleeding may occur.  You may have a slight blood stain on the toilet paper after a bowel movement.    To lessen the chance of bleeding, avoid heavy exercise for ONE WEEK.  This includes heavy lifting, vigorous sport activities, and heavy physical labor.  You may resume your normal sexual activity.      Avoid aspirin or aspirin products if instructed by your doctor.    WHAT TO WATCH FOR:  Problems rarely occur after the exam; however, it is important for you to watch for early signs of possible problems.  If you have     Unusual pain in your abdomen    Nausea and vomiting that persists    Excessive bleeding    Black or bloody bowel movements    Fever or temperature above 100.6 F  Please call your doctor ([facility] [phone]) or go to the nearest hospital emergency room.    Post-Anesthesia Patient Instructions    IMMEDIATELY FOLLOWING SURGERY:  Do not drive or operate machinery for the first twenty four hours after surgery.  Do not make any important decisions for twenty four hours after surgery or while taking narcotic pain medications or sedatives.  If you develop intractable nausea and vomiting or a severe headache please notify your doctor immediately.    FOLLOW-UP:  Please make an appointment with your surgeon as instructed. You do not need to follow up with anesthesia unless specifically  instructed to do so.    WOUND CARE INSTRUCTIONS (if applicable):  Keep a dry clean dressing on the anesthesia/puncture wound site if there is drainage.  Once the wound has quit draining you may leave it open to air.  Generally you should leave the bandage intact for twenty four hours unless there is drainage.  If the epidural site drains for more than 36-48 hours please call the anesthesia department.    QUESTIONS?:  Please feel free to call your physician or the hospital  if you have any questions, and they will be happy to assist you.

## 2017-10-30 NOTE — OR NURSING
Patient and responsible adult given discharge instructions with no questions regarding instructions. Go score 20. Pain level 0/10.  Discharged from unit via ambualted. Patient discharged to home.

## 2017-10-30 NOTE — ANESTHESIA PREPROCEDURE EVALUATION
Anesthesia Evaluation     . Pt has had prior anesthetic.     History of anesthetic complications   - PONV        ROS/MED HX    ENT/Pulmonary:     (+)allergic rhinitis, other ENT- s/p FESS 10/2013, asthma Last exacerbation: RAD,, . .    Neurologic:  - neg neurologic ROS     Cardiovascular:  - neg cardiovascular ROS       METS/Exercise Tolerance:     Hematologic:  - neg hematologic  ROS       Musculoskeletal:  - neg musculoskeletal ROS       GI/Hepatic:     (+) bowel prep,       Renal/Genitourinary:     (+) Other Renal/ Genitourinary, S/p laparoscopic supracervical hysterectomy       Endo:  - neg endo ROS       Psychiatric:  - neg psychiatric ROS       Infectious Disease:  - neg infectious disease ROS       Malignancy:      - no malignancy   Other: Comment: S/p laparoscopic supracervical hysterectomy    (+) No chance of pregnancy                    Physical Exam  Normal systems: cardiovascular, pulmonary and dental    Airway   Mallampati: I  TM distance: >3 FB  Neck ROM: full    Dental     Cardiovascular   Rhythm and rate: regular and normal      Pulmonary    breath sounds clear to auscultation                    Anesthesia Plan      History & Physical Review  History and physical reviewed and following examination; no interval change.    ASA Status:  2 .    NPO Status:  > 8 hours    Plan for MAC with Intravenous and Propofol induction. Maintenance will be TIVA.    PONV prophylaxis:  Ondansetron (or other 5HT-3)       Postoperative Care  Postoperative pain management:  IV analgesics.      Consents  Anesthetic plan, risks, benefits and alternatives discussed with:  Patient..                          .

## 2017-10-30 NOTE — OP NOTE
Luly Freeman MRN# [id number]   YOB: 1965 Age: 52 year old      Date of Admission:  10/30/2017    Primary care provider: Tere Herrera    PREOPERATIVE DIAGNOSIS:    Screening colonoscopy.         POSTOPERATIVE DIAGNOSIS:    Sigmoid diverticulosis (few in number)  Otherwise normal colonoscopic examination.          PROCEDURE:  Whole colon colonoscopy.         INDICATIONS:  This 52 year old female presents for her first screening colonoscopy.      OPERATIVE FINDINGS:  There were two sigmoid diverticuli; otherwise, the colonic mucosa was normal from anus to cecum.          DESCRIPTION OF PROCEDURE:  With the patient in the supine position on the transport cart, IV sedation was administered by the nurse anesthetist.  Her correct identity and planned procedure were confirmed during the requisite timeout pause and she was rolled to the left lateral position.  The anus was digitally dilated and the fiberoptic colonoscope was introduced and negotiated through the length of the colon to the cecal base.  The cecum was intubated and its landmarks clearly identified.  A circumferential examination of the mucosa on introduction of the colonoscope and on its slow withdrawal confirmed the absence of polyp, neoplasia, inflammation and/or stricture.  There were two sigmoid diverticuli noted.  Retroflex in the rectal ampulla showed no evidence of pathology.  Air was aspirated and the colonoscope was withdrawn; the patient was returned to day surgery in good condition, without suggestion of complication and with our invitation to return in 10 years for followup screening examination.   The post surgical debriefing was held and acknowledged at completion.          MARISSA CALLES MD, FACS     10/30/2017 9:08 AM

## 2017-10-31 NOTE — PHARMACY
Accessed the patient's chart to complete the weekly pharmacy controlled substance audit.  Isabel Hall, Pharm.D.

## 2017-12-18 ENCOUNTER — TELEPHONE (OUTPATIENT)
Dept: ALLERGY | Facility: OTHER | Age: 52
End: 2017-12-18

## 2017-12-18 NOTE — TELEPHONE ENCOUNTER
Luly called and LM requesting a SLIT refill.  I spoke with her.  She states she is doing fine on drops, has current epi pens.  She is overdue for a fu.  Her last visit was 9/2015 with NAZANIN Tucker.  She will need to fu with Mary.  Her last refill was 9/2017.  The order was given at that time for her to begin tapering.   She has been using 1 drop TID for this last vial.    This refill will be 1 drop BID for 1 vial.   Her final vial will be 1 drop daily,  x 1 vial.  She will then DC SLIT.  Her call is transferred to the ENT Brittny GONZALEZ for a fu appointment, and this note is transferred to her.  Luly agrees with this plan and offers no additional questions.  Lydia Lama

## 2018-01-19 ENCOUNTER — OFFICE VISIT (OUTPATIENT)
Dept: OTOLARYNGOLOGY | Facility: OTHER | Age: 53
End: 2018-01-19
Attending: PHYSICIAN ASSISTANT
Payer: COMMERCIAL

## 2018-01-19 VITALS
HEART RATE: 66 BPM | WEIGHT: 160 LBS | SYSTOLIC BLOOD PRESSURE: 98 MMHG | HEIGHT: 68 IN | DIASTOLIC BLOOD PRESSURE: 70 MMHG | BODY MASS INDEX: 24.25 KG/M2 | TEMPERATURE: 97.1 F

## 2018-01-19 DIAGNOSIS — J30.2 SEASONAL ALLERGIC RHINITIS, UNSPECIFIED CHRONICITY, UNSPECIFIED TRIGGER: ICD-10-CM

## 2018-01-19 DIAGNOSIS — M26.609 TMJ (TEMPOROMANDIBULAR JOINT SYNDROME): ICD-10-CM

## 2018-01-19 DIAGNOSIS — H69.92 DYSFUNCTION OF LEFT EUSTACHIAN TUBE: ICD-10-CM

## 2018-01-19 DIAGNOSIS — Z98.890 HISTORY OF MYRINGOTOMY: ICD-10-CM

## 2018-01-19 DIAGNOSIS — J30.89 PERENNIAL ALLERGIC RHINITIS: Primary | ICD-10-CM

## 2018-01-19 DIAGNOSIS — H91.90 HEARING LOSS, UNSPECIFIED HEARING LOSS TYPE, UNSPECIFIED LATERALITY: ICD-10-CM

## 2018-01-19 DIAGNOSIS — T78.40XD ALLERGIC REACTION, SUBSEQUENT ENCOUNTER: ICD-10-CM

## 2018-01-19 PROCEDURE — 99213 OFFICE O/P EST LOW 20 MIN: CPT | Performed by: PHYSICIAN ASSISTANT

## 2018-01-19 RX ORDER — EPINEPHRINE 0.3 MG/.3ML
0.3 INJECTION SUBCUTANEOUS PRN
Qty: 0.6 ML | Refills: 1 | Status: SHIPPED | OUTPATIENT
Start: 2018-01-19 | End: 2018-10-15

## 2018-01-19 ASSESSMENT — PAIN SCALES - GENERAL: PAINLEVEL: NO PAIN (0)

## 2018-01-19 NOTE — NURSING NOTE
"Chief Complaint   Patient presents with     Allergies     SLIT Follow Up       Initial BP 98/70 (Cuff Size: Adult Regular)  Pulse 66  Temp 97.1  F (36.2  C) (Tympanic)  Ht 5' 8\" (1.727 m)  Wt 160 lb (72.6 kg)  BMI 24.33 kg/m2 Estimated body mass index is 24.33 kg/(m^2) as calculated from the following:    Height as of this encounter: 5' 8\" (1.727 m).    Weight as of this encounter: 160 lb (72.6 kg).  Medication Reconciliation: complete   Danna Urena  "

## 2018-01-19 NOTE — PROGRESS NOTES
"Chief Complaint   Patient presents with     Allergies     SLIT Follow Up     Luly ramey for SLIT f/u. She was last seen 9/19/16 by Elizabeth Silverio NP in ENT for SLIT. She was having no concerns at that time and noticing improvement.   Luly has been doing well. Reports fall symptoms were good. No significant seasonal complaints. Used Flonase, Allegra PRN. Denies need for daily use.   Currently using TID of SLIT. She was to decrease to BID dose, but did not.     She is on Maintenance dose since July 2014  No recent sinus. Occasional left ear fullness/ otalgia. Decreased hearing on left side.   Hx of TMJ and uses dental guard use. She does grind her teeth at night, recent stress at home. However, symptoms are improving.   Past audiogram  2011 Normal thresholds with patent tube. Hx of Left tube w/ Dr. Elliott in 2011.     Sinus CT 2013- Normal, mild DNS.       Past Medical History:   Diagnosis Date     Allergic rhinitis 06/22/2011     Lump or mass in breast 03/12/2007     PONV (postoperative nausea and vomiting)      Recurrent sinusitis 07/20/2011     Varicose veins 06/22/2011        Allergies   Allergen Reactions     Seasonal Allergies      Current Outpatient Prescriptions   Medication     SUBLINGUAL IMMUNOTHERAPY, SLIT,     fluticasone (FLONASE) 50 MCG/ACT nasal spray     ALBUTEROL 108 (90 BASE) MCG/ACT inhaler     EPINEPHrine (AUVI-Q) 0.3 MG/0.3ML injection     fexofenadine (ALLEGRA) 180 MG tablet     No current facility-administered medications for this visit.       ROS: 10 point ROS neg other than the symptoms noted above in the HPI.  BP 98/70 (Cuff Size: Adult Regular)  Pulse 66  Temp 97.1  F (36.2  C) (Tympanic)  Ht 5' 8\" (1.727 m)  Wt 160 lb (72.6 kg)  BMI 24.33 kg/m2      General - The patient is well nourished and well developed.  Alert and oriented to person and place, answers questions and cooperates with examination appropriately.   Head and Face - Normocephalic and atraumatic, with no gross " asymmetry noted.  The facial nerve is intact, with strong symmetric movements.  Voice and Breathing - The patient was breathing comfortably without the use of accessory muscles. There was no wheezing, stridor, or stertor.  The patients voice was clear and strong, and had appropriate pitch and quality.  Ears - External ears are normal in appearance. The external auditory canals are patent, the tympanic membranes are intact without effusion, retraction or mass.  Bony landmarks are intact.  Eyes - Extraocular movements intact.  Conjunctiva were not injected.  Mouth - Examination of the oral cavity showed pink, healthy oral mucosa. No lesions or ulcerations noted.  The tongue was mobile and midline, and the dentition were in good condition.    Throat - The walls of the oropharynx were smooth, pink, moist, symmetric, and had no lesions or ulcerations.  The tonsillar pillars and soft palate were symmetric.  The uvula was midline on elevation.    Neck -  Palpation of the occipital, submental, submandibular, internal jugular chain, and supraclavicular nodes did not demonstrate any abnormal lymph nodes or masses.  Palpation of the thyroid was soft and smooth, with no nodules or goiter appreciated.  The trachea was mobile and midline.  Nose - External contour is symmetric, no gross deflection or scars.  Nasal mucosa is pink and moist with no abnormal mucus.  The septum was intact, turbinates of normal size and position.  No polyps, masses, or purulence noted on examination.    ASSESSMENT:    ICD-10-CM    1. Perennial allergic rhinitis J30.89 EPINEPHrine (EPIPEN/ADRENACLICK/OR ANY BX GENERIC EQUIV) 0.3 MG/0.3ML injection 2-pack   2. Seasonal allergic rhinitis, unspecified chronicity, unspecified trigger J30.2    3. Allergic reaction, subsequent encounter T78.40XD EPINEPHrine (EPIPEN/ADRENACLICK/OR ANY BX GENERIC EQUIV) 0.3 MG/0.3ML injection 2-pack   4. Dysfunction of left eustachian tube H69.82 AUDIOLOGY ADULT REFERRAL   5.  Hearing loss, unspecified hearing loss type, unspecified laterality H91.90    6. TMJ (temporomandibular joint syndrome) M26.60    7. History of myringotomy Z98.890      Complete current vial at TID dosing. Next vial will start taper.   Next vial in 3 months will be BID dosing, followed by one drop daily.     Use allegra, flonase PRN    Epipen updated.     Discussed her ear complaints, left discomfort radiating into neck region. Reviewed with patient, normal ear exam and no effusions. Symptoms are greater related to TMJ. She will continue with current use of dental guard at this time.   TMJ instructions reviewed.     Audiogram to be completed. Patient notes a decline in her hearing. Reviewed past audiograms normal.       Mary Ashley PA-C  ENT  [facility]  [phone]

## 2018-01-19 NOTE — PATIENT INSTRUCTIONS
Continue with three drops daily. Next vial will decrease to 2 drops daily.   Use Allegra, Flonase as needed  Epipen is renewed.     Audiogram is recommended.    TMJ precautions.     Based on today's history and physical exam, I can find no evidence of middle ear pathology or eustachian tube dysfunction.  At this point my primary diagnosis is of temporomandibular syndrome.  I have discussed the etiology of TMJ, and the reasons why referred pain can mimic symptoms of ear disease, headaches, and even sinusitis.  i have given the patient an instructional sheet of things to be tried at home, as well a referral to a TMJ specialist should it be needed.  Finally, I counseled the patient that should the therapy not help, or should the symptoms change, that they should return to me.    Thank you for allowing SAM Tucker and our ENT team to participate in your care.  If your medications are too expensive, please give the nurse a call.  We can possibly change this medication.  If you have a scheduling or an appointment question please contact [facility] Unit Coordinator at their direct line [phone].   ALL nursing questions or concerns can be directed to your ENT nurse at: [phone] [place]

## 2018-01-19 NOTE — MR AVS SNAPSHOT
After Visit Summary   1/19/2018    Luly Freeman    MRN: [id number]           Patient Information     Date Of Birth          1965        Visit Information        Provider Department      1/19/2018 8:15 AM Mary Ashley PA-C [facility] [place]        Today's Diagnoses     Perennial allergic rhinitis    -  1    Seasonal allergic rhinitis, unspecified chronicity, unspecified trigger        Allergic reaction, subsequent encounter          Care Instructions    Continue with three drops daily. Next vial will decrease to 2 drops daily.   Use Allegra, Flonase as needed  Epipen is renewed.     Audiogram is recommended.    TMJ precautions.     Based on today's history and physical exam, I can find no evidence of middle ear pathology or eustachian tube dysfunction.  At this point my primary diagnosis is of temporomandibular syndrome.  I have discussed the etiology of TMJ, and the reasons why referred pain can mimic symptoms of ear disease, headaches, and even sinusitis.  i have given the patient an instructional sheet of things to be tried at home, as well a referral to a TMJ specialist should it be needed.  Finally, I counseled the patient that should the therapy not help, or should the symptoms change, that they should return to me.    Thank you for allowing SAM Tucker and our ENT team to participate in your care.  If your medications are too expensive, please give the nurse a call.  We can possibly change this medication.  If you have a scheduling or an appointment question please contact [facility] Unit Coordinator at their direct line [phone].   ALL nursing questions or concerns can be directed to your ENT nurse at: [phone] [place]              Follow-ups after your visit        Who to contact     If you have questions or need follow up information about today's clinic visit or your schedule please contact [facility] [place] directly at [phone].  Normal or non-critical lab  "and imaging results will be communicated to you by mymxloghart, letter or phone within 4 business days after the clinic has received the results. If you do not hear from us within 7 days, please contact the clinic through [employer] or phone. If you have a critical or abnormal lab result, we will notify you by phone as soon as possible.  Submit refill requests through [employer] or call your pharmacy and they will forward the refill request to us. Please allow 3 business days for your refill to be completed.          Additional Information About Your Visit        mymxlogharSealedMedia Information     [employer] gives you secure access to your electronic health record. If you see a primary care provider, you can also send messages to your care team and make appointments. If you have questions, please call your primary care clinic.  If you do not have a primary care provider, please call [phone] and they will assist you.        Care EveryWhere ID     This is your Care EveryWhere ID. This could be used by other organizations to access your [place] medical records  [id number]        Your Vitals Were     Pulse Temperature Height BMI (Body Mass Index)          66 97.1  F (36.2  C) (Tympanic) 5' 8\" (1.727 m) 24.33 kg/m2         Blood Pressure from Last 3 Encounters:   01/19/18 98/70   10/30/17 98/71   08/07/17 98/60    Weight from Last 3 Encounters:   01/19/18 160 lb (72.6 kg)   10/30/17 160 lb 3.2 oz (72.7 kg)   08/07/17 160 lb (72.6 kg)              Today, you had the following     No orders found for display         Today's Medication Changes          These changes are accurate as of: 1/19/18  8:26 AM.  If you have any questions, ask your nurse or doctor.               These medicines have changed or have updated prescriptions.        Dose/Directions    * EPINEPHrine 0.3 MG/0.3ML injection   Commonly known as:  AUVI-Q   This may have changed:  Another medication with the same name was added. Make sure you understand how and when to take " each.   Used for:  Anaphylactic reaction   Changed by:  Mary Ashley PA-C        Dose:  0.3 mg   Inject 0.3 mLs into the muscle once as needed for anaphylaxis for 1 dose.   Quantity:  2 each   Refills:  2       * EPINEPHrine 0.3 MG/0.3ML injection 2-pack   Commonly known as:  EPIPEN/ADRENACLICK/or ANY BX GENERIC EQUIV   This may have changed:  You were already taking a medication with the same name, and this prescription was added. Make sure you understand how and when to take each.   Used for:  Perennial allergic rhinitis, Allergic reaction, subsequent encounter   Changed by:  Mary Ashley PA-C        Dose:  0.3 mg   Inject 0.3 mLs (0.3 mg) into the muscle as needed for anaphylaxis   Quantity:  0.6 mL   Refills:  1       * Notice:  This list has 2 medication(s) that are the same as other medications prescribed for you. Read the directions carefully, and ask your doctor or other care provider to review them with you.         Where to get your medicines      These [facility] [place] [facility] [address] AT [facility] [address], [place] [address]     Phone:  [phone]     EPINEPHrine 0.3 MG/0.3ML injection 2-pack                Primary Care Provider Office Phone # Fax #    Tere Herrera [phone]       [facility] [place] [address]  [place] [address]        Equal Access to Services     [facility]: Hadii dmitri ku hadasho Soomaali, waaxda luqadaha, qaybta kaalmada adeegyada, suleman ng . So [facility] [phone].    ATENCIÓN: Si habla español, tiene a morocho disposición servicios gratuitos de asistencia lingüística. Llame al [phone].    We comply with applicable federal civil rights laws and [place] laws. We do not discriminate on the basis of race, color, national origin, age, disability, sex, sexual orientation, or gender identity.            Thank you!     Thank you for choosing [facility]  for your care. Our goal is always to provide you with excellent care. Hearing back from our patients is one way we can continue to improve our services. Please take a few minutes to complete the written survey that you may receive in the mail after your visit with us. Thank you!             Your Updated Medication List - Protect others around you: Learn how to safely use, store and throw away your medicines at www.disposemymeds.org.          This list is accurate as of: 1/19/18  8:26 AM.  Always use your most recent med list.                   Brand Name Dispense Instructions for use Diagnosis    * EPINEPHrine 0.3 MG/0.3ML injection    AUVI-Q    2 each    Inject 0.3 mLs into the muscle once as needed for anaphylaxis for 1 dose.    Anaphylactic reaction       * EPINEPHrine 0.3 MG/0.3ML injection 2-pack    EPIPEN/ADRENACLICK/or ANY BX GENERIC EQUIV    0.6 mL    Inject 0.3 mLs (0.3 mg) into the muscle as needed for anaphylaxis    Perennial allergic rhinitis, Allergic reaction, subsequent encounter       fexofenadine 180 MG tablet    ALLEGRA    30 tablet    Take 1 tablet by mouth every morning.        fluticasone 50 MCG/ACT spray    FLONASE     Spray 2 sprays into both nostrils daily        PROAIR  (90 BASE) MCG/ACT Inhaler   Generic drug:  albuterol     8.5 g    INHALE 2 PUFFS BY MOUTH EVERY 6 HOURS AS NEEDED FOR SHORTNESS OF BREATH    Acute bronchitis       SUBLINGUAL IMMUNOTHERAPY (SLIT)     1 Bottle    Continue SLIT, 1 drop 3 x day (SL) for 1 vial, then 1 drop 2 x day (SL) for 1 vial, then 1 drop once daily (SL) for 1 vial, then discontinue.    Perennial allergic rhinitis       * Notice:  This list has 2 medication(s) that are the same as other medications prescribed for you. Read the directions carefully, and ask your doctor or other care provider to review them with you.

## 2018-01-23 ENCOUNTER — TELEPHONE (OUTPATIENT)
Dept: ALLERGY | Facility: OTHER | Age: 53
End: 2018-01-23

## 2018-01-23 DIAGNOSIS — J30.89 PERENNIAL ALLERGIC RHINITIS: ICD-10-CM

## 2018-01-24 ENCOUNTER — TELEPHONE (OUTPATIENT)
Dept: ALLERGY | Facility: OTHER | Age: 53
End: 2018-01-24

## 2018-01-24 NOTE — TELEPHONE ENCOUNTER
Patient received her new vial of SLIT in December 2017, with dosing of one drop twice daily.  Patient has been taking one drop three times daily with this vial.  Patient instructed to finish the vial taking one drop three times daily, and the next vial will be one drop twice daily, then the last vial will be one drop once daily, then discontinue SLIT.  Called [facility] to let them know that the next vial should be one drop twice daily.      Zahira Sharma RN

## 2018-01-26 ENCOUNTER — OFFICE VISIT (OUTPATIENT)
Dept: AUDIOLOGY | Facility: OTHER | Age: 53
End: 2018-01-26
Attending: AUDIOLOGIST
Payer: COMMERCIAL

## 2018-01-26 DIAGNOSIS — H69.92 DYSFUNCTION OF LEFT EUSTACHIAN TUBE: ICD-10-CM

## 2018-01-26 DIAGNOSIS — H69.93 DYSFUNCTION OF BOTH EUSTACHIAN TUBES: ICD-10-CM

## 2018-01-26 DIAGNOSIS — H90.2 CONDUCTIVE HEARING LOSS, UNILATERAL: ICD-10-CM

## 2018-01-26 PROCEDURE — 92570 ACOUSTIC IMMITANCE TESTING: CPT | Performed by: AUDIOLOGIST

## 2018-01-26 PROCEDURE — 92557 COMPREHENSIVE HEARING TEST: CPT | Performed by: AUDIOLOGIST

## 2018-01-26 NOTE — MR AVS SNAPSHOT
After Visit Summary   1/26/2018    Luly Freeman    MRN: [id number]           Patient Information     Date Of Birth          1965        Visit Information        Provider Department      1/26/2018 8:15 AM Minnie Solis AuD [facility]        Today's Diagnoses     Dysfunction of left eustachian tube        Dysfunction of both eustachian tubes        Conductive hearing loss, unilateral           Follow-ups after your visit        Your next 10 appointments already scheduled     Feb 09, 2018  8:15 AM CST   (Arrive by 8:00 AM)   Return Visit with Mary Ashley PA-C   [facility] [place] [facility]    [address]  [place] [address]   [phone]              Who to contact     If you have questions or need follow up information about today's clinic visit or your schedule please contact [facility] directly at [phone].  Normal or non-critical lab and imaging results will be communicated to you by MyChart, letter or phone within 4 business days after the clinic has received the results. If you do not hear from us within 7 days, please contact the clinic through [employer] or phone. If you have a critical or abnormal lab result, we will notify you by phone as soon as possible.  Submit refill requests through [employer] or call your pharmacy and they will forward the refill request to us. Please allow 3 business days for your refill to be completed.          Additional Information About Your Visit        MyChart Information     [employer] gives you secure access to your electronic health record. If you see a primary care provider, you can also send messages to your care team and make appointments. If you have questions, please call your primary care clinic.  If you do not have a primary care provider, please call [phone] and they will assist you.        Care EveryWhere ID     This is your Care EveryWhere ID. This could be used by other  organizations to access your [place] medical records  [id number]         Blood Pressure from Last 3 Encounters:   01/19/18 98/70   10/30/17 98/71   08/07/17 98/60    Weight from Last 3 Encounters:   01/19/18 160 lb (72.6 kg)   10/30/17 160 lb 3.2 oz (72.7 kg)   08/07/17 160 lb (72.6 kg)              We Performed the Following     AUDIOLOGY ADULT REFERRAL        Primary Care Provider Office Phone # Fax #    Tere Herrera [phone]       [facility] [place] [address]  [place] [address]        Equal Access to Services     [facility] CELI : Hadii aad ku hadasho Soskipali, waaxda luqadaha, qaybta kaalmada adeegyada, suleman ng . So [facility] [phone].    ATENCIÓN: Si habla español, tiene a morocho disposición servicios gratuitos de asistencia lingüística. Llame al [phone].    We comply with applicable federal civil rights laws and [place] laws. We do not discriminate on the basis of race, color, national origin, age, disability, sex, sexual orientation, or gender identity.            Thank you!     Thank you for choosing [facility]  for your care. Our goal is always to provide you with excellent care. Hearing back from our patients is one way we can continue to improve our services. Please take a few minutes to complete the written survey that you may receive in the mail after your visit with us. Thank you!             Your Updated Medication List - Protect others around you: Learn how to safely use, store and throw away your medicines at www.disposemymeds.org.          This list is accurate as of 1/26/18  8:41 AM.  Always use your most recent med list.                   Brand Name Dispense Instructions for use Diagnosis    * EPINEPHrine 0.3 MG/0.3ML injection    AUVI-Q    2 each    Inject 0.3 mLs into the muscle once as needed for anaphylaxis for 1 dose.    Anaphylactic reaction       * EPINEPHrine 0.3 MG/0.3ML injection 2-pack     EPIPEN/ADRENACLICK/or ANY BX GENERIC EQUIV    0.6 mL    Inject 0.3 mLs (0.3 mg) into the muscle as needed for anaphylaxis    Perennial allergic rhinitis, Allergic reaction, subsequent encounter       fexofenadine 180 MG tablet    ALLEGRA    30 tablet    Take 1 tablet by mouth every morning.        fluticasone 50 MCG/ACT spray    FLONASE     Spray 2 sprays into both nostrils daily        PROAIR  (90 BASE) MCG/ACT Inhaler   Generic drug:  albuterol     8.5 g    INHALE 2 PUFFS BY MOUTH EVERY 6 HOURS AS NEEDED FOR SHORTNESS OF BREATH    Acute bronchitis       SUBLINGUAL IMMUNOTHERAPY (SLIT)     1 Bottle    Complete current vial at TID dosing.  Next vial, decrease to BID x 1 vial.  Then one drop daily.    Perennial allergic rhinitis       * Notice:  This list has 2 medication(s) that are the same as other medications prescribed for you. Read the directions carefully, and ask your doctor or other care provider to review them with you.

## 2018-01-26 NOTE — PROGRESS NOTES
Audiology Evaluation Completed. Please refer SCANNED AUDIOGRAM and/or TYMPANOGRAM for BACKGROUND, RESULTS, RECOMMENDATIONS.    *Decay tested-normal.    UNDER RECOMMENDATIONS ON AUDIOGRAM PATIENT REFERRED TO ENT WITH SYMPTOMS      Minnie GLASER, [facility]  Audiologist #[id number]

## 2018-02-09 ENCOUNTER — OFFICE VISIT (OUTPATIENT)
Dept: OTOLARYNGOLOGY | Facility: OTHER | Age: 53
End: 2018-02-09
Attending: PHYSICIAN ASSISTANT
Payer: COMMERCIAL

## 2018-02-09 VITALS
BODY MASS INDEX: 24.25 KG/M2 | HEIGHT: 68 IN | DIASTOLIC BLOOD PRESSURE: 60 MMHG | SYSTOLIC BLOOD PRESSURE: 102 MMHG | OXYGEN SATURATION: 99 % | HEART RATE: 65 BPM | WEIGHT: 160 LBS | RESPIRATION RATE: 20 BRPM | TEMPERATURE: 97.9 F

## 2018-02-09 DIAGNOSIS — Z98.890 HISTORY OF MYRINGOTOMY: ICD-10-CM

## 2018-02-09 DIAGNOSIS — H69.92 DYSFUNCTION OF LEFT EUSTACHIAN TUBE: ICD-10-CM

## 2018-02-09 DIAGNOSIS — G50.1 FACIAL PAIN, ATYPICAL: ICD-10-CM

## 2018-02-09 DIAGNOSIS — J30.2 SEASONAL ALLERGIC RHINITIS, UNSPECIFIED CHRONICITY, UNSPECIFIED TRIGGER: ICD-10-CM

## 2018-02-09 DIAGNOSIS — J30.89 PERENNIAL ALLERGIC RHINITIS: Primary | ICD-10-CM

## 2018-02-09 PROCEDURE — 99213 OFFICE O/P EST LOW 20 MIN: CPT | Performed by: PHYSICIAN ASSISTANT

## 2018-02-09 RX ORDER — BUDESONIDE 0.5 MG/2ML
INHALANT ORAL
Qty: 2 BOX | Refills: 1 | Status: SHIPPED | OUTPATIENT
Start: 2018-02-09 | End: 2018-10-15

## 2018-02-09 RX ORDER — TRIAMCINOLONE ACETONIDE 55 UG/1
2 SPRAY, METERED NASAL DAILY
Qty: 2 BOTTLE | Refills: 3 | Status: SHIPPED | OUTPATIENT
Start: 2018-02-09 | End: 2018-12-05

## 2018-02-09 ASSESSMENT — PAIN SCALES - GENERAL: PAINLEVEL: MILD PAIN (3)

## 2018-02-09 NOTE — LETTER
2/9/2018         RE: Luly Freeman  [address]  [place] [address]        Dear Colleague,    Thank you for referring your patient, Luly Freeman, to the [facility]. Please see a copy of my visit note below.    Chief Complaint   Patient presents with     RECHECK     F/U Lft ETD, hearing loss, TMJ      Luly presents for a f/u in regards to her left ear. She was last seen on 1/19/18 for SLIT f/u and ear complaints. At that time, she was c/o otalgia, radiating jaw/ neck pain. Description of her symptoms related to TMJ. She did have a dental guard and was using it. Symptoms at her appointment were improving.   Luly did note mild hearing change with her left ear. Audiogram order was placed and returns to review results.   Hx of left tube placement following c/o otalgia. Tube was placed in 2011 with Dr. Elliott, subsequently removed in office in 2013. No COM.   However, her hearing did not seem as good and c/o ear fullness following tube placement.   Luly had noted change in hearing at her last visit which prompted an audiogram to be completed. She has noticed continued complaints of left maxillary pressure. She had completed root canal. Worsening symptoms with her left ear. Luly notes concerns worsening of her left ear. She has no spinning vertigo. Complaints of aches, facial pain.   She has aural fullness, neck pain worsening since her last visit.       Audiogram Reviewed- 1/26/18  Type Ad tympanograms, high admittance.   Thresholds- Right within normal range. Left with slight to mild conductive hearing loss.   Acoustic testing was negative due to her high admittance per audiologist.       Tolerating SLIT. She is on Maintenance dose since July 2014  Completed in office, left maxillary balloon sinusplasty.     Past Medical History:   Diagnosis Date     Allergic rhinitis 06/22/2011     Lump or mass in breast 03/12/2007     PONV (postoperative nausea and vomiting)      Recurrent sinusitis 07/20/2011  "    Varicose veins 06/22/2011        Allergies   Allergen Reactions     Seasonal Allergies      Current Outpatient Prescriptions   Medication     SUBLINGUAL IMMUNOTHERAPY, SLIT,     EPINEPHrine (EPIPEN/ADRENACLICK/OR ANY BX GENERIC EQUIV) 0.3 MG/0.3ML injection 2-pack     fluticasone (FLONASE) 50 MCG/ACT nasal spray     ALBUTEROL 108 (90 BASE) MCG/ACT inhaler     EPINEPHrine (AUVI-Q) 0.3 MG/0.3ML injection     fexofenadine (ALLEGRA) 180 MG tablet     No current facility-administered medications for this visit.       ROS: 10 point ROS neg other than the symptoms noted above in the HPI.    /60 (BP Location: Right arm, Patient Position: Chair, Cuff Size: Adult Regular)  Pulse 65  Temp 97.9  F (36.6  C) (Tympanic)  Resp 20  Ht 5' 8\" (1.727 m)  Wt 160 lb (72.6 kg)  SpO2 99%  BMI 24.33 kg/m2    General - The patient is well nourished and well developed.  Alert and oriented to person and place, answers questions and cooperates with examination appropriately.   Head and Face - Normocephalic and atraumatic, with no gross asymmetry noted.  The facial nerve is intact, with strong symmetric movements.  Voice and Breathing - The patient was breathing comfortably without the use of accessory muscles. There was no wheezing, stridor, or stertor.  The patients voice was clear and strong, and had appropriate pitch and quality.  Ears - External ears are normal in appearance. The external auditory canals are patent, the tympanic membranes are intact without effusion, retraction or mass.  Bony landmarks are intact. TMs are mobile, Left <Right.   Eyes - Extraocular movements intact.  Conjunctiva were not injected.  Mouth - Examination of the oral cavity showed pink, healthy oral mucosa. No lesions or ulcerations noted.  The tongue was mobile and midline, and the dentition were in good condition.    Throat - The walls of the oropharynx were smooth, pink, moist, symmetric, and had no lesions or ulcerations.  The tonsillar " pillars and soft palate were symmetric.  The uvula was midline on elevation.    Neck -  Palpation of the occipital, submental, submandibular, internal jugular chain, and supraclavicular nodes did not demonstrate any abnormal lymph nodes or masses.  Palpation of the thyroid was soft and smooth, with no nodules or goiter appreciated.  The trachea was mobile and midline.  Nose - External contour is symmetric, no gross deflection or scars.  Nasal mucosa is pink and moist with no abnormal mucus.  The septum was intact, turbinates of normal size and position.  No polyps, masses, or purulence noted on examination.       ASSESSMENT:    ICD-10-CM    1. Perennial allergic rhinitis J30.89 triamcinolone (NASACORT AQ) 55 MCG/ACT Inhaler     budesonide (PULMICORT) 0.5 MG/2ML neb solution   2. Seasonal allergic rhinitis, unspecified chronicity, unspecified trigger J30.2 triamcinolone (NASACORT AQ) 55 MCG/ACT Inhaler     budesonide (PULMICORT) 0.5 MG/2ML neb solution   3. Dysfunction of left eustachian tube H69.82 triamcinolone (NASACORT AQ) 55 MCG/ACT Inhaler   4. History of myringotomy Z98.890 triamcinolone (NASACORT AQ) 55 MCG/ACT Inhaler   5. Facial pain, atypical G50.1      Right hearing is within normal range. Left shows a slight/ mild loss, conductive.   Normal ear exam, no fluid, infection.     Start Aj med rinse w/ Budesonide rinse. Rinse twice a day.   Start nasacort. Use 2 sprays to each nostril daily  Use above for 2-4 weeks. If no improvement, consider Intranasal Lidocaine and/ or neurology. Possible atypical facial pain.   Past Sinus CT, nasal exam is negative. No acute sinusitis.     Monitor hearing at this time. May consider tube placement, however she had no significant improvement.        Mary Ashley PA-C  ENT  [facility]  [phone]      Again, thank you for allowing me to participate in the care of your patient.        Sincerely,        Mary Ashley PA-C

## 2018-02-09 NOTE — PATIENT INSTRUCTIONS
Right hearing is within normal range. Left shows a slight/ mild loss, conductive.   Normal ear exam, no fluid, infection.     Start Aj med rinse w/ Budesonide rinse. Rinse twice a day.   Start nasacort. Use 2 sprays to each nostril daily  Use above for 2-4 weeks. If no improvement, consider other nasal medication and/ or neurology. Possible atypical facial pain.     Thank you for allowing SAM Tucker and our ENT team to participate in your care.  If your medications are too expensive, please give the nurse a call.  We can possibly change this medication.  If you have a scheduling or an appointment question please contact [facility] our Health Unit Coordinator at their direct line [phone].   ALL nursing questions or concerns can be directed to your ENT nurse at: [phone] [place]

## 2018-02-09 NOTE — NURSING NOTE
"Chief Complaint   Patient presents with     RECHECK     F/U Lft ETD, hearing loss, TMJ        Initial /60 (BP Location: Right arm, Patient Position: Chair, Cuff Size: Adult Regular)  Pulse 65  Temp 97.9  F (36.6  C) (Tympanic)  Resp 20  Ht 5' 8\" (1.727 m)  Wt 160 lb (72.6 kg)  SpO2 99%  BMI 24.33 kg/m2 Estimated body mass index is 24.33 kg/(m^2) as calculated from the following:    Height as of this encounter: 5' 8\" (1.727 m).    Weight as of this encounter: 160 lb (72.6 kg).  Medication Reconciliation: franny Urena      "

## 2018-02-09 NOTE — MR AVS SNAPSHOT
After Visit Summary   2/9/2018    Luly Freeman    MRN: [id number]           Patient Information     Date Of Birth          1965        Visit Information        Provider Department      2/9/2018 8:15 AM Mary Ashley PA-C [facility]        Today's Diagnoses     Perennial allergic rhinitis    -  1    Seasonal allergic rhinitis, unspecified chronicity, unspecified trigger        Dysfunction of left eustachian tube        History of myringotomy          Care Instructions    Right hearing is within normal range. Left shows a slight/ mild loss, conductive.   Normal ear exam, no fluid, infection.     Start Aj med rinse w/ Budesonide rinse. Rinse twice a day.   Start nasacort. Use 2 sprays to each nostril daily  Use above for 2-4 weeks. If no improvement, consider other nasal medication and/ or neurology. Possible atypical facial pain.     Thank you for allowing SAM Tucker and our ENT team to participate in your care.  If your medications are too expensive, please give the nurse a call.  We can possibly change this medication.  If you have a scheduling or an appointment question please contact [facility] Unit Coordinator at their direct line [phone].   ALL nursing questions or concerns can be directed to your ENT nurse at: [phone] [place]              Follow-ups after your visit        Who to contact     If you have questions or need follow up information about today's clinic visit or your schedule please contact [facility] directly at [phone].  Normal or non-critical lab and imaging results will be communicated to you by MyChart, letter or phone within 4 business days after the clinic has received the results. If you do not hear from us within 7 days, please contact the clinic through MyChart or phone. If you have a critical or abnormal lab result, we will notify you by phone as soon as possible.  Submit refill requests through [employer] or call your  "pharmacy and they will forward the refill request to us. Please allow 3 business days for your refill to be completed.          Additional Information About Your Visit        [employer] Information     [employer] gives you secure access to your electronic health record. If you see a primary care provider, you can also send messages to your care team and make appointments. If you have questions, please call your primary care clinic.  If you do not have a primary care provider, please call [phone] and they will assist you.        Care EveryWhere ID     This is your Care EveryWhere ID. This could be used by other organizations to access your [place] medical records  [id number]        Your Vitals Were     Pulse Temperature Respirations Height Pulse Oximetry BMI (Body Mass Index)    65 97.9  F (36.6  C) (Tympanic) 20 5' 8\" (1.727 m) 99% 24.33 kg/m2       Blood Pressure from Last 3 Encounters:   02/09/18 102/60   01/19/18 98/70   10/30/17 98/71    Weight from Last 3 Encounters:   02/09/18 160 lb (72.6 kg)   01/19/18 160 lb (72.6 kg)   10/30/17 160 lb 3.2 oz (72.7 kg)              Today, you had the following     No orders found for display         Today's Medication Changes          These changes are accurate as of 2/9/18  8:38 AM.  If you have any questions, ask your nurse or doctor.               Start taking these medicines.        Dose/Directions    budesonide 0.5 MG/2ML neb solution   Commonly known as:  PULMICORT   Used for:  Perennial allergic rhinitis, Seasonal allergic rhinitis, unspecified chronicity, unspecified trigger   Started by:  Mary Ashley PA-C        Make 240 cc Aj med sinus irrigation Mix 2 ml vial of budesonide 0.5 mg Rinse BID for 2 weeks   Quantity:  2 Box   Refills:  1       triamcinolone 55 MCG/ACT Inhaler   Commonly known as:  NASACORT AQ   Used for:  Perennial allergic rhinitis, Seasonal allergic rhinitis, unspecified chronicity, unspecified trigger, History of myringotomy, Dysfunction of " left eustachian tube   Started by:  Mary Ashley PA-C        Dose:  2 spray   Spray 2 sprays into both nostrils daily   Quantity:  2 Bottle   Refills:  3            Where to get your medicines      These medications were sent to [id number] - [place], [place] - [address] [place] [facility] [address], [place] [address]     Phone:  [phone]     budesonide 0.5 MG/2ML neb solution    triamcinolone 55 MCG/ACT Inhaler                Primary Care Provider Office Phone # Fax #    Tere Herrera [phone]       [facility] [place] [address]  [facility] [id number]        Equal Access to Services     ORION ALATORRE AH: Hadii dmitri paredes hadasho Soomaali, waaxda luqadaha, qaybta kaalmada adeegyada, suleman ng . So [facility] [phone].    ATENCIÓN: Si habla español, tiene a morocho disposición servicios gratuitos de asistencia lingüística. [facility] [phone].    We comply with applicable federal civil rights laws and [place] laws. We do not discriminate on the basis of race, color, national origin, age, disability, sex, sexual orientation, or gender identity.            Thank you!     Thank you for choosing [facility]  for your care. Our goal is always to provide you with excellent care. Hearing back from our patients is one way we can continue to improve our services. Please take a few minutes to complete the written survey that you may receive in the mail after your visit with us. Thank you!             Your Updated Medication List - Protect others around you: Learn how to safely use, store and throw away your medicines at www.disposemymeds.org.          This list is accurate as of 2/9/18  8:38 AM.  Always use your most recent med list.                   Brand Name Dispense Instructions for use Diagnosis    budesonide 0.5 MG/2ML neb solution    PULMICORT    2 Box    Make 240 cc Aj med sinus irrigation Mix 2 ml vial of budesonide 0.5  mg Rinse BID for 2 weeks    Perennial allergic rhinitis, Seasonal allergic rhinitis, unspecified chronicity, unspecified trigger       * EPINEPHrine 0.3 MG/0.3ML injection    AUVI-Q    2 each    Inject 0.3 mLs into the muscle once as needed for anaphylaxis for 1 dose.    Anaphylactic reaction       * EPINEPHrine 0.3 MG/0.3ML injection 2-pack    EPIPEN/ADRENACLICK/or ANY BX GENERIC EQUIV    0.6 mL    Inject 0.3 mLs (0.3 mg) into the muscle as needed for anaphylaxis    Perennial allergic rhinitis, Allergic reaction, subsequent encounter       fexofenadine 180 MG tablet    ALLEGRA    30 tablet    Take 1 tablet by mouth every morning.        fluticasone 50 MCG/ACT spray    FLONASE     Spray 2 sprays into both nostrils daily        PROAIR  (90 BASE) MCG/ACT Inhaler   Generic drug:  albuterol     8.5 g    INHALE 2 PUFFS BY MOUTH EVERY 6 HOURS AS NEEDED FOR SHORTNESS OF BREATH    Acute bronchitis       SUBLINGUAL IMMUNOTHERAPY (SLIT)     1 Bottle    Complete current vial at TID dosing.  Next vial, decrease to BID x 1 vial.  Then one drop daily.    Perennial allergic rhinitis       triamcinolone 55 MCG/ACT Inhaler    NASACORT AQ    2 Bottle    Spray 2 sprays into both nostrils daily    Perennial allergic rhinitis, Seasonal allergic rhinitis, unspecified chronicity, unspecified trigger, History of myringotomy, Dysfunction of left eustachian tube       * Notice:  This list has 2 medication(s) that are the same as other medications prescribed for you. Read the directions carefully, and ask your doctor or other care provider to review them with you.

## 2018-02-09 NOTE — PROGRESS NOTES
Chief Complaint   Patient presents with     RECHECK     F/U Lft ETD, hearing loss, TMJ      Luly presents for a f/u in regards to her left ear. She was last seen on 1/19/18 for SLIT f/u and ear complaints. At that time, she was c/o otalgia, radiating jaw/ neck pain. Description of her symptoms related to TMJ. She did have a dental guard and was using it. Symptoms at her appointment were improving.   Luly did note mild hearing change with her left ear. Audiogram order was placed and returns to review results.   Hx of left tube placement following c/o otalgia. Tube was placed in 2011 with Dr. Elliott, subsequently removed in office in 2013. No COM.   However, her hearing did not seem as good and c/o ear fullness following tube placement.   Luly had noted change in hearing at her last visit which prompted an audiogram to be completed. She has noticed continued complaints of left maxillary pressure. She had completed root canal. Worsening symptoms with her left ear. Luly notes concerns worsening of her left ear. She has no spinning vertigo. Complaints of aches, facial pain.   She has aural fullness, neck pain worsening since her last visit.       Audiogram Reviewed- 1/26/18  Type Ad tympanograms, high admittance.   Thresholds- Right within normal range. Left with slight to mild conductive hearing loss.   Acoustic testing was negative due to her high admittance per audiologist.       Tolerating SLIT. She is on Maintenance dose since July 2014  Completed in office, left maxillary balloon sinusplasty.     Past Medical History:   Diagnosis Date     Allergic rhinitis 06/22/2011     Lump or mass in breast 03/12/2007     PONV (postoperative nausea and vomiting)      Recurrent sinusitis 07/20/2011     Varicose veins 06/22/2011        Allergies   Allergen Reactions     Seasonal Allergies      Current Outpatient Prescriptions   Medication     SUBLINGUAL IMMUNOTHERAPY, SLIT,     EPINEPHrine (EPIPEN/ADRENACLICK/OR ANY BX  "GENERIC EQUIV) 0.3 MG/0.3ML injection 2-pack     fluticasone (FLONASE) 50 MCG/ACT nasal spray     ALBUTEROL 108 (90 BASE) MCG/ACT inhaler     EPINEPHrine (AUVI-Q) 0.3 MG/0.3ML injection     fexofenadine (ALLEGRA) 180 MG tablet     No current facility-administered medications for this visit.       ROS: 10 point ROS neg other than the symptoms noted above in the HPI.    /60 (BP Location: Right arm, Patient Position: Chair, Cuff Size: Adult Regular)  Pulse 65  Temp 97.9  F (36.6  C) (Tympanic)  Resp 20  Ht 5' 8\" (1.727 m)  Wt 160 lb (72.6 kg)  SpO2 99%  BMI 24.33 kg/m2    General - The patient is well nourished and well developed.  Alert and oriented to person and place, answers questions and cooperates with examination appropriately.   Head and Face - Normocephalic and atraumatic, with no gross asymmetry noted.  The facial nerve is intact, with strong symmetric movements.  Voice and Breathing - The patient was breathing comfortably without the use of accessory muscles. There was no wheezing, stridor, or stertor.  The patients voice was clear and strong, and had appropriate pitch and quality.  Ears - External ears are normal in appearance. The external auditory canals are patent, the tympanic membranes are intact without effusion, retraction or mass.  Bony landmarks are intact. TMs are mobile, Left <Right.   Eyes - Extraocular movements intact.  Conjunctiva were not injected.  Mouth - Examination of the oral cavity showed pink, healthy oral mucosa. No lesions or ulcerations noted.  The tongue was mobile and midline, and the dentition were in good condition.    Throat - The walls of the oropharynx were smooth, pink, moist, symmetric, and had no lesions or ulcerations.  The tonsillar pillars and soft palate were symmetric.  The uvula was midline on elevation.    Neck -  Palpation of the occipital, submental, submandibular, internal jugular chain, and supraclavicular nodes did not demonstrate any abnormal " lymph nodes or masses.  Palpation of the thyroid was soft and smooth, with no nodules or goiter appreciated.  The trachea was mobile and midline.  Nose - External contour is symmetric, no gross deflection or scars.  Nasal mucosa is pink and moist with no abnormal mucus.  The septum was intact, turbinates of normal size and position.  No polyps, masses, or purulence noted on examination.       ASSESSMENT:    ICD-10-CM    1. Perennial allergic rhinitis J30.89 triamcinolone (NASACORT AQ) 55 MCG/ACT Inhaler     budesonide (PULMICORT) 0.5 MG/2ML neb solution   2. Seasonal allergic rhinitis, unspecified chronicity, unspecified trigger J30.2 triamcinolone (NASACORT AQ) 55 MCG/ACT Inhaler     budesonide (PULMICORT) 0.5 MG/2ML neb solution   3. Dysfunction of left eustachian tube H69.82 triamcinolone (NASACORT AQ) 55 MCG/ACT Inhaler   4. History of myringotomy Z98.890 triamcinolone (NASACORT AQ) 55 MCG/ACT Inhaler   5. Facial pain, atypical G50.1      Right hearing is within normal range. Left shows a slight/ mild loss, conductive.   Normal ear exam, no fluid, infection.     Start Aj med rinse w/ Budesonide rinse. Rinse twice a day.   Start nasacort. Use 2 sprays to each nostril daily  Use above for 2-4 weeks. If no improvement, consider Intranasal Lidocaine and/ or neurology. Possible atypical facial pain.   Past Sinus CT, nasal exam is negative. No acute sinusitis.     Monitor hearing at this time. May consider tube placement, however she had no significant improvement.        Mary Ashley PA-C  ENT  [facility], [place]  [phone]

## 2018-03-12 ENCOUNTER — MYC MEDICAL ADVICE (OUTPATIENT)
Dept: OTOLARYNGOLOGY | Facility: OTHER | Age: 53
End: 2018-03-12

## 2018-03-13 NOTE — TELEPHONE ENCOUNTER
Great. Continue with rinse for out to 2 months, then return to hyacinth med rinse and continue with nasal spray.     SAM Tucker

## 2018-05-23 ENCOUNTER — TRANSFERRED RECORDS (OUTPATIENT)
Dept: HEALTH INFORMATION MANAGEMENT | Facility: CLINIC | Age: 53
End: 2018-05-23

## 2018-08-29 ENCOUNTER — OFFICE VISIT (OUTPATIENT)
Dept: PODIATRY | Facility: OTHER | Age: 53
End: 2018-08-29
Attending: PODIATRIST
Payer: COMMERCIAL

## 2018-08-29 VITALS — SYSTOLIC BLOOD PRESSURE: 106 MMHG | DIASTOLIC BLOOD PRESSURE: 70 MMHG | HEART RATE: 65 BPM | TEMPERATURE: 98 F

## 2018-08-29 DIAGNOSIS — M79.671 FOOT PAIN, RIGHT: ICD-10-CM

## 2018-08-29 DIAGNOSIS — M20.11 HALLUX VALGUS (ACQUIRED), RIGHT FOOT: ICD-10-CM

## 2018-08-29 DIAGNOSIS — M20.11 HALLUX VALGUS (ACQUIRED), RIGHT FOOT: Primary | ICD-10-CM

## 2018-08-29 DIAGNOSIS — I83.93 VARICOSE VEINS OF BOTH LOWER EXTREMITIES: ICD-10-CM

## 2018-08-29 PROCEDURE — 99203 OFFICE O/P NEW LOW 30 MIN: CPT | Performed by: PODIATRIST

## 2018-08-29 PROCEDURE — 73630 X-RAY EXAM OF FOOT: CPT | Mod: TC

## 2018-08-29 NOTE — PROGRESS NOTES
"Chief complaint: No chief complaint on file.      History of Present Illness: This 53 year old female is seen for the first time by podiatry at [facility] for evaluation and suggestions of management of a RIGHT foot bunion deformity. She had seen Dr. Chi and she was scheduled for surgery in December, 2017, but her  changed jobs and there was a change in her insurance during that time. She is now here for another evaluation of her bunion deformity. She has had the bunion for a \"long time\" and it is very painful. It sometimes feels like a \"bee sting\" and can be very painful with the worst pain almost bringing tears. She wears custom orthotics and the inserts have been modified, but they do not significantly decrease her pain. The patient works on her feet all day for 12-hour shifts at a [employer] and one shift a week at [employer]. She is wondering if it is recommended at this time for her to have surgery and states she is at the point where her foot bothers her on a daily basis. Regular walking isn't too painful, but bending or squatting makes the toe feel like it's going to \"crack\" and can suddenly cause a pain in the joint. No further pedal complaints at this time.    Patient has a history of a LEFT bunion surgical correction that was performed locally, but it was 10+ years ago. She has no complaints or pain with the left foot today.    There were no vitals taken for this visit.    Patient Active Problem List   Diagnosis     Multiple respiratory allergies     ACP (advance care planning)       Past Surgical History:   Procedure Laterality Date     BUNIONECTOMY RT/LT  4/2008     COLONOSCOPY N/A 10/30/2017    Procedure: COLONOSCOPY;  WHOLE COLON COLONOSCOPY ;  Surgeon: Erma Jenkins MD;  Location: [facility] OR     ENT SURGERY  10/2013    nose surgery, Dr. Elliott     EXCISE MASS LOWER EXTREMITY Right 5/22/2017    Procedure: EXCISE MASS LOWER EXTREMITY;  EXCISION SOFT TISSUE MASS RIGHT LOWER LEG;  Surgeon: Gregory" Erma SALAZAR MD;  Location: [facility] OR            laparoscopic supracervical hysterectomy      Fibroids, ovaries intact     Left Breast Bx  3/2007    Fibrocystic breast disease     PE tube placement       ventilation tube, left         Current Outpatient Prescriptions   Medication     ALBUTEROL 108 (90 BASE) MCG/ACT inhaler     budesonide (PULMICORT) 0.5 MG/2ML neb solution     EPINEPHrine (AUVI-Q) 0.3 MG/0.3ML injection     EPINEPHrine (EPIPEN/ADRENACLICK/OR ANY BX GENERIC EQUIV) 0.3 MG/0.3ML injection 2-pack     fexofenadine (ALLEGRA) 180 MG tablet     fluticasone (FLONASE) 50 MCG/ACT nasal spray     SUBLINGUAL IMMUNOTHERAPY, SLIT,     triamcinolone (NASACORT AQ) 55 MCG/ACT Inhaler     No current facility-administered medications for this visit.           Allergies   Allergen Reactions     Seasonal Allergies        Family History   Problem Relation Age of Onset     C.A.D. Father      Diabetes No family hx of      Cerebrovascular Disease No family hx of        Social History     Social History     Marital status:      Spouse name: N/A     Number of children: N/A     Years of education: N/A     Occupational History     cosmotologist      Full-time     Social History Main Topics     Smoking status: Never Smoker     Smokeless tobacco: Never Used      Comment: no passive exposure     Alcohol use Yes      Comment: rarely     Drug use: No     Sexual activity: Not on file     Other Topics Concern     Caffeine Concern Yes     coffee, 1 cup daily     Parent/Sibling W/ Cabg, Mi Or Angioplasty Before 65f 55m? No     Social History Narrative       ROS: 10 point ROS neg other than the symptoms noted above in the HPI.  EXAM  Constitutional: healthy, alert and no distress    Psychiatric: mentation appears normal and affect normal/bright    VASCULAR:  -Dorsalis pedis pulse +2/4 b/l  -Posterior tibial pulse +2/4 b/l  -Telangiectasias and varicosities present to b/l ankles and posterior legs  NEURO:  -Light touch  sensation intact to b/l plantar forefoot  DERM:  -Skin temperature, texture and turgor WNL b/l  -Toenails normotrophic x 10  MSK:  -Pain on palpation to medial RIGHT 1st MTPJ  -Medial and dorsal prominence to RIGHT 1st MTPJ with mild, flexible lateral deviation of hallux  -ROM WNL for RIGHT 1st MTPJ without forefoot loading  -No crepitus with passive ROM of RIGHT hallux with tenderness at end ROM dorsiflexion  -No hypermobility noted to 1st ray of RIGHT foot  -Muscle srength +5/5 for dorsiflexion, plantarflexion, ABDUction and ADDuction b/l  -WB reveals proper arch retention b/l  -RCSP 1 degree valgus b/l    ============================================================    ASSESSMENT:  (M20.11) Hallux valgus (acquired), right foot  (primary encounter diagnosis)    (M79.671) Foot pain, right    (I83.93) Varicose veins of both lower extremities      PLAN:  -Patient evaluated and examined. Treatment options discussed with no educational barriers noted.  -Advised patient to wear compression socks to prevent edema and slow the progression of further varicose veins and telangiectasias. Patient has compression socks at home but she doesn't wear them as often in the summer.   -Radiographs WB 3 views of b/l feet ordered today  -Evaluated patient's CMO's. They fit well and conform nicely to her arch, but there is no correction or modification for the 1st ray  -Discussed surgical options vs. conservative options with patient. Offered to add a Garrido's extension on her CMO to limit micro-motion of the joint. This will unlikely help with end ROM, however. Offered a joint injection with a steroid, but this is generally a short-term option and the pain would most likely return. Informed patient that no elective surgery is ever required and any surgery can leave more pain post-operatively and/or numbness and edema long-term in the digit, but she may have more benefit from a surgical correction at this point since she states she is  daily affected by WB activities. Patient in agreement with moving forward with surgical correction of the RIGHT hallux valgus deformity.  -Instructed patient to set up an H&P with her PCP between her pre-op on 10/10 and her surgery on 10/22  -Pre-op Wednesday, 10/10/18 at 2:00 Will discuss pain medication, dispense a CAM boot (patient has crutches at home), risks and benefits, sign an informed consent, review post-op compression, and place patient on the hospital surgery schedule.  -Surgery scheduled for Monday, 10/22/2018, RIGHT HAV correction with distal chevron osteotomy  -All of patients questions were answered and she was in agreement with the above treatment plan      Netta Soni DPM

## 2018-08-29 NOTE — NURSING NOTE
"Chief Complaint   Patient presents with     Bunion     right foot       Initial /70 (BP Location: Right arm, Patient Position: Sitting, Cuff Size: Adult Regular)  Pulse 65  Temp 98  F (36.7  C) (Tympanic) Estimated body mass index is 24.33 kg/(m^2) as calculated from the following:    Height as of 2/9/18: 5' 8\" (1.727 m).    Weight as of 2/9/18: 160 lb (72.6 kg).  Medication Reconciliation: complete    Marisel Ruiz LPN  "

## 2018-08-29 NOTE — MR AVS SNAPSHOT
After Visit Summary   8/29/2018    Luly Freeman    MRN: [id number]           Patient Information     Date Of Birth          1965        Visit Information        Provider Department      8/29/2018 9:00 AM Netta Soni DPM [facility]        Today's Diagnoses     Hallux valgus (acquired), right foot    -  1    Foot pain, right        Varicose veins of both lower extremities          Care Instructions    .          Follow-ups after your visit        Your next 10 appointments already scheduled     Oct 10, 2018  2:00 PM CDT   (Arrive by 1:45 PM)   New Visit with Netta Soni DPM   [facility] ([facility] )    [address]  [place] [id number] [phone]            Oct 15, 2018 10:00 AM CDT   (Arrive by 9:45 AM)   Office Visit with Tere Herrera MD   [facility] ([facility] )    [address] [place] [address]  [place] [address]   [phone]           Bring a current list of meds and any records pertaining to this visit.  For Physicals, please bring immunization records and any forms needing to be filled out.  Please arrive 15 minutes early to complete paperwork and register.              Who to contact     If you have questions or need follow up information about today's clinic visit or your schedule please contact [facility] directly at [phone].  Normal or non-critical lab and imaging results will be communicated to you by MyChart, letter or phone within 4 business days after the clinic has received the results. If you do not hear from us within 7 days, please contact the clinic through MyChart or phone. If you have a critical or abnormal lab result, we will notify you by phone as soon as possible.  Submit refill requests through [employer] or call your pharmacy and they will forward the refill request to us. Please allow 3 business days for your refill to be completed.           Additional Information About Your Visit        MyChart Information     [facility] gives you secure access to your electronic health record. If you see a primary care provider, you can also send messages to your care team and make appointments. If you have questions, please call your primary care clinic.  If you do not have a primary care provider, please call [phone] and they will assist you.        Care EveryWhere ID     This is your Care EveryWhere ID. This could be used by other organizations to access your [facility] medical records  [id number]        Your Vitals Were     Pulse Temperature                65 98  F (36.7  C) (Tympanic)           Blood Pressure from Last 3 Encounters:   08/29/18 106/70   02/09/18 102/60   01/19/18 98/70    Weight from Last 3 Encounters:   02/09/18 160 lb (72.6 kg)   01/19/18 160 lb (72.6 kg)   10/30/17 160 lb 3.2 oz (72.7 kg)               Primary Care Provider Office Phone # Fax #    Tere Herrera [phone]       [facility] [id number]        Equal Access to Services     [facility] : Hadii dmitri ku hadasho Soshine, waaxda luqadaha, qaybta kaalmalydia benitez, suleman ng . So [facility] [phone].    ATENCIÓN: Si habla español, tiene a morocho disposición servicios gratuitos de asistencia lingüística. [facility] [phone].    We comply with applicable federal civil rights laws and [place] laws. We do not discriminate on the basis of race, color, national origin, age, disability, sex, sexual orientation, or gender identity.            Thank you!     Thank you for choosing [facility]  for your care. Our goal is always to provide you with excellent care. Hearing back from our patients is one way we can continue to improve our services. Please take a few minutes to complete the written survey that you may receive in the mail after your visit with us. Thank you!             Your Updated Medication List -  Protect others around you: Learn how to safely use, store and throw away your medicines at www.disposemymeds.org.          This list is accurate as of 8/29/18  9:57 AM.  Always use your most recent med list.                   Brand Name Dispense Instructions for use Diagnosis    budesonide 0.5 MG/2ML neb solution    PULMICORT    2 Box    Make 240 cc Aj med sinus irrigation Mix 2 ml vial of budesonide 0.5 mg Rinse BID for 2 weeks    Perennial allergic rhinitis, Seasonal allergic rhinitis, unspecified chronicity, unspecified trigger       * EPINEPHrine 0.3 MG/0.3ML injection    AUVI-Q    2 each    Inject 0.3 mLs into the muscle once as needed for anaphylaxis for 1 dose.    Anaphylactic reaction       * EPINEPHrine 0.3 MG/0.3ML injection 2-pack    EPIPEN/ADRENACLICK/or ANY BX GENERIC EQUIV    0.6 mL    Inject 0.3 mLs (0.3 mg) into the muscle as needed for anaphylaxis    Perennial allergic rhinitis, Allergic reaction, subsequent encounter       fexofenadine 180 MG tablet    ALLEGRA    30 tablet    Take 1 tablet by mouth every morning.        fluticasone 50 MCG/ACT spray    FLONASE     Spray 2 sprays into both nostrils daily        PROAIR  (90 Base) MCG/ACT inhaler   Generic drug:  albuterol     8.5 g    INHALE 2 PUFFS BY MOUTH EVERY 6 HOURS AS NEEDED FOR SHORTNESS OF BREATH    Acute bronchitis       SUBLINGUAL IMMUNOTHERAPY (SLIT)     1 Bottle    Complete current vial at TID dosing.  Next vial, decrease to BID x 1 vial.  Then one drop daily.    Perennial allergic rhinitis       triamcinolone 55 MCG/ACT inhaler    NASACORT AQ    2 Bottle    Spray 2 sprays into both nostrils daily    Perennial allergic rhinitis, Seasonal allergic rhinitis, unspecified chronicity, unspecified trigger, History of myringotomy, Dysfunction of left eustachian tube       * Notice:  This list has 2 medication(s) that are the same as other medications prescribed for you. Read the directions carefully, and ask your doctor or other care  provider to review them with you.

## 2018-10-10 ENCOUNTER — OFFICE VISIT (OUTPATIENT)
Dept: PODIATRY | Facility: OTHER | Age: 53
End: 2018-10-10
Attending: PODIATRIST
Payer: COMMERCIAL

## 2018-10-10 VITALS
HEART RATE: 73 BPM | DIASTOLIC BLOOD PRESSURE: 71 MMHG | SYSTOLIC BLOOD PRESSURE: 105 MMHG | RESPIRATION RATE: 12 BRPM | TEMPERATURE: 97.6 F

## 2018-10-10 DIAGNOSIS — M79.671 FOOT PAIN, RIGHT: ICD-10-CM

## 2018-10-10 DIAGNOSIS — I83.93 ASYMPTOMATIC VARICOSE VEINS OF BOTH LOWER EXTREMITIES: ICD-10-CM

## 2018-10-10 DIAGNOSIS — M20.11 HALLUX VALGUS (ACQUIRED), RIGHT FOOT: Primary | ICD-10-CM

## 2018-10-10 PROCEDURE — 99213 OFFICE O/P EST LOW 20 MIN: CPT | Performed by: PODIATRIST

## 2018-10-10 ASSESSMENT — PAIN SCALES - GENERAL: PAINLEVEL: MODERATE PAIN (4)

## 2018-10-10 NOTE — PROGRESS NOTES
"Chief complaint: Patient presents with:  Pre-Op Exam: right foot bunion      History of Present Illness: This 53 year old female is seen for the first time by podiatry at [place] for evaluation and suggestions of management of a RIGHT foot bunion deformity. She had seen Dr. Chi and she was scheduled for surgery in December, 2017, but her  changed jobs and there was a change in her insurance during that time. She would still like to have the bunion corrected and is here to discuss her surgical procedure and post-operative course.    She has had the bunion for a \"long time\" and it is very painful. It sometimes feels like a \"bee sting\" and can be very painful with the worst pain almost bringing tears. She wears custom orthotics and the inserts have been modified, but they do not significantly decrease her pain. The patient works on her feet all day for 12-hour shifts at a [employer] and one shift a week at [employer]. Regular walking isn't too painful, but bending or squatting makes the toe feel like it's going to \"crack\" and can suddenly cause a pain in the joint. No further pedal complaints at this time.     Patient has a history of a LEFT bunion surgical correction that was performed locally, but it was 10+ years ago. She has no complaints or pain with the left foot today.    /71 (BP Location: Right arm, Patient Position: Sitting, Cuff Size: Adult Regular)  Pulse 73  Temp 97.6  F (36.4  C) (Tympanic)  Resp 12    Patient Active Problem List   Diagnosis     Multiple respiratory allergies     ACP (advance care planning)       Past Surgical History:   Procedure Laterality Date     BUNIONECTOMY RT/LT  4/2008     COLONOSCOPY N/A 10/30/2017    Procedure: COLONOSCOPY;  WHOLE COLON COLONOSCOPY ;  Surgeon: Erma Jenkins MD;  Location: [facility] OR     ENT SURGERY  10/2013    nose surgery, Dr. Elliott     EXCISE MASS LOWER EXTREMITY Right 5/22/2017    Procedure: EXCISE MASS LOWER EXTREMITY;  EXCISION SOFT TISSUE " MASS RIGHT LOWER LEG;  Surgeon: Erma Jenkins MD;  Location: [facility] OR            laparoscopic supracervical hysterectomy      Fibroids, ovaries intact     Left Breast Bx  3/2007    Fibrocystic breast disease     PE tube placement       ventilation tube, left         Current Outpatient Prescriptions   Medication     EPINEPHrine (AUVI-Q) 0.3 MG/0.3ML injection     EPINEPHrine (EPIPEN/ADRENACLICK/OR ANY BX GENERIC EQUIV) 0.3 MG/0.3ML injection 2-pack     order for DME     ALBUTEROL 108 (90 BASE) MCG/ACT inhaler     budesonide (PULMICORT) 0.5 MG/2ML neb solution     fexofenadine (ALLEGRA) 180 MG tablet     fluticasone (FLONASE) 50 MCG/ACT nasal spray     SUBLINGUAL IMMUNOTHERAPY, SLIT,     triamcinolone (NASACORT AQ) 55 MCG/ACT Inhaler     No current facility-administered medications for this visit.           Allergies   Allergen Reactions     Seasonal Allergies        Family History   Problem Relation Age of Onset     C.A.D. Father      Diabetes No family hx of      Cerebrovascular Disease No family hx of        Social History     Social History     Marital status:      Spouse name: N/A     Number of children: N/A     Years of education: N/A     Occupational History     cosmotologist      Full-time     Social History Main Topics     Smoking status: Never Smoker     Smokeless tobacco: Never Used      Comment: no passive exposure     Alcohol use Yes      Comment: rarely     Drug use: No     Sexual activity: Not on file     Other Topics Concern     Caffeine Concern Yes     coffee, 1 cup daily     Parent/Sibling W/ Cabg, Mi Or Angioplasty Before 65f 55m? No     Social History Narrative       ROS: 10 point ROS neg other than the symptoms noted above in the HPI.  EXAM  Constitutional: healthy, alert and no distress     Psychiatric: mentation appears normal and affect normal/bright     VASCULAR:  -Dorsalis pedis pulse +2/4 b/l  -Posterior tibial pulse +2/4 b/l  -Telangiectasias and varicosities present  to b/l ankles and posterior legs  NEURO:  -Light touch sensation intact to b/l plantar forefoot  DERM:  -Skin temperature, texture and turgor WNL b/l  -Toenails normotrophic x 10  MSK:  -Pain on palpation to medial RIGHT 1st MTPJ  -Medial and dorsal prominence to RIGHT 1st MTPJ with mild, flexible lateral deviation of hallux  -ROM WNL for RIGHT 1st MTPJ without forefoot loading  -No crepitus with passive ROM of RIGHT hallux with tenderness at end ROM dorsiflexion and plantarflexion  -No hypermobility noted to 1st ray of RIGHT foot  -Muscle strength +5/5 for dorsiflexion, plantarflexion, ABDUction and ADDuction b/l  -WB reveals proper arch retention b/l from 08/29/2018  -RCSP 1 degree valgus b/l from 08/29/2018    RADIOGRAPHS RIGHT FOOT 08/29/2018     FINDINGS: There is a mild hallux valgus deformity. There is no  significant degenerative change in the first metatarsal phalangeal  joint.     No fracture or dislocation is seen. There is no significant  degenerative change.          IMPRESSION: Mild hallux valgus deformity.     LISSETTE HAHN MD    RADIOGRAPHS LEFT FOOT 08/29/2018  FINDINGS: There has been previous bunion surgery with postsurgical  changes in the first metatarsal. There is no significant hallux valgus  deformity. There is mild degenerative change of the first metatarsal  phalangeal joint.     No fracture, dislocation or other significant degenerative change is  seen.          IMPRESSION: Postsurgical changes in the first metatarsal.     LISSETTE HAHN MD  ============================================================     ASSESSMENT:  (M20.11) Hallux valgus (acquired), right foot  (primary encounter diagnosis)     (M79.671) Foot pain, right     (I83.93) Varicose veins of both lower extremities       PLAN:  -Patient evaluated and examined. Treatment options discussed with no educational barriers noted.  -Advised patient to wear compression socks to prevent edema and slow the progression of further  "varicose veins and telangiectasias. Patient has compression socks at home but she doesn't wear them as often in the summer.   -Reviewed Radiographs WB 3 views of b/l feet ordered today  -Reviewed risks and benefits of a bunion surgery. Educated patient about potential recurrence of the deformity as well as potential for a post-operative infection. She has exhausted conservative treatment and she would like to proceed with surgical correction.  -DME order for knee scooter rental for up to eight weeks from the date of the surgery.  -Discussed post-op course with patient. She will be NWB in a CAM boot. 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 She will remain 100% NWB with a CAM boot and crutches for two weeks with minimal weight on her heel only while wearing a CAM boot at the salon. She states she may work for a couple hours then elevate her foot for a while in between customers. She has agreed to not removing the CAM boot. She will slowly progress to WB in her CAM boot between weeks 4 and 6, then potentially transition to a stable tennis shoe from week 6 to 8 based on the progression of her heeling.  -CAM boot to be dispensed in PACU  -Percocet 5/325mg to be dispensed in PACU  -Patient already has crutches  -Surgery: 10/22/2018 \"Right foot 1st metatarsal distal chevron osteotomy and lateral soft tissue release\"  -Post-op #1 Thursday, 10/25/2018  -All of patients questions were answered and she was in agreement with the above treatment plan           Netta Soni DPM  "

## 2018-10-10 NOTE — NURSING NOTE
"Chief Complaint   Patient presents with     Pre-Op Exam     right foot bunion       Initial /71 (BP Location: Right arm, Patient Position: Sitting, Cuff Size: Adult Regular)  Pulse 73  Temp 97.6  F (36.4  C) (Tympanic)  Resp 12 Estimated body mass index is 24.33 kg/(m^2) as calculated from the following:    Height as of 2/9/18: 5' 8\" (1.727 m).    Weight as of 2/9/18: 160 lb (72.6 kg).  Medication Reconciliation: complete    Marisel Ruiz LPN  "

## 2018-10-10 NOTE — LETTER
"    10/10/2018        RE: Luly Freeman  [address]  [place] [address]        Chief complaint: Patient presents with:  Pre-Op Exam: right foot bunion      History of Present Illness: This 53 year old female is seen for the first time by podiatry at [place] for evaluation and suggestions of management of a RIGHT foot bunion deformity. She had seen Dr. Chi and she was scheduled for surgery in December, 2017, but her  changed jobs and there was a change in her insurance during that time. She would still like to have the bunion corrected and is here to discuss her surgical procedure and post-operative course.    She has had the bunion for a \"long time\" and it is very painful. It sometimes feels like a \"bee sting\" and can be very painful with the worst pain almost bringing tears. She wears custom orthotics and the inserts have been modified, but they do not significantly decrease her pain. The patient works on her feet all day for 12-hour shifts at a [employer] and one shift a week at [employer]. Regular walking isn't too painful, but bending or squatting makes the toe feel like it's going to \"crack\" and can suddenly cause a pain in the joint. No further pedal complaints at this time.     Patient has a history of a LEFT bunion surgical correction that was performed locally, but it was 10+ years ago. She has no complaints or pain with the left foot today.    /71 (BP Location: Right arm, Patient Position: Sitting, Cuff Size: Adult Regular)  Pulse 73  Temp 97.6  F (36.4  C) (Tympanic)  Resp 12    Patient Active Problem List   Diagnosis     Multiple respiratory allergies     ACP (advance care planning)       Past Surgical History:   Procedure Laterality Date     BUNIONECTOMY RT/LT  4/2008     COLONOSCOPY N/A 10/30/2017    Procedure: COLONOSCOPY;  WHOLE COLON COLONOSCOPY ;  Surgeon: Erma Jenkins MD;  Location: [facility]     ENT SURGERY  10/2013    nose surgery, Dr. Elliott     EXCISE MASS LOWER EXTREMITY " Right 2017    Procedure: EXCISE MASS LOWER EXTREMITY;  EXCISION SOFT TISSUE MASS RIGHT LOWER LEG;  Surgeon: Erma Jenkins MD;  Location: [facility] OR            laparoscopic supracervical hysterectomy      Fibroids, ovaries intact     Left Breast Bx  3/2007    Fibrocystic breast disease     PE tube placement       ventilation tube, left         Current Outpatient Prescriptions   Medication     EPINEPHrine (AUVI-Q) 0.3 MG/0.3ML injection     EPINEPHrine (EPIPEN/ADRENACLICK/OR ANY BX GENERIC EQUIV) 0.3 MG/0.3ML injection 2-pack     order for DME     ALBUTEROL 108 (90 BASE) MCG/ACT inhaler     budesonide (PULMICORT) 0.5 MG/2ML neb solution     fexofenadine (ALLEGRA) 180 MG tablet     fluticasone (FLONASE) 50 MCG/ACT nasal spray     SUBLINGUAL IMMUNOTHERAPY, SLIT,     triamcinolone (NASACORT AQ) 55 MCG/ACT Inhaler     No current facility-administered medications for this visit.           Allergies   Allergen Reactions     Seasonal Allergies        Family History   Problem Relation Age of Onset     C.A.D. Father      Diabetes No family hx of      Cerebrovascular Disease No family hx of        Social History     Social History     Marital status:      Spouse name: N/A     Number of children: N/A     Years of education: N/A     Occupational History     cosmotologist      Full-time     Social History Main Topics     Smoking status: Never Smoker     Smokeless tobacco: Never Used      Comment: no passive exposure     Alcohol use Yes      Comment: rarely     Drug use: No     Sexual activity: Not on file     Other Topics Concern     Caffeine Concern Yes     coffee, 1 cup daily     Parent/Sibling W/ Cabg, Mi Or Angioplasty Before 65f 55m? No     Social History Narrative       ROS: 10 point ROS neg other than the symptoms noted above in the HPI.  EXAM  Constitutional: healthy, alert and no distress     Psychiatric: mentation appears normal and affect normal/bright     VASCULAR:  -Dorsalis pedis pulse +2/4  b/l  -Posterior tibial pulse +2/4 b/l  -Telangiectasias and varicosities present to b/l ankles and posterior legs  NEURO:  -Light touch sensation intact to b/l plantar forefoot  DERM:  -Skin temperature, texture and turgor WNL b/l  -Toenails normotrophic x 10  MSK:  -Pain on palpation to medial RIGHT 1st MTPJ  -Medial and dorsal prominence to RIGHT 1st MTPJ with mild, flexible lateral deviation of hallux  -ROM WNL for RIGHT 1st MTPJ without forefoot loading  -No crepitus with passive ROM of RIGHT hallux with tenderness at end ROM dorsiflexion and plantarflexion  -No hypermobility noted to 1st ray of RIGHT foot  -Muscle strength +5/5 for dorsiflexion, plantarflexion, ABDUction and ADDuction b/l  -WB reveals proper arch retention b/l from 08/29/2018  -RCSP 1 degree valgus b/l  from 08/29/2018    RADIOGRAPHS RIGHT FOOT 08/29/2018     FINDINGS: There is a mild hallux valgus deformity. There is no  significant degenerative change in the first metatarsal phalangeal  joint.     No fracture or dislocation is seen. There is no significant  degenerative change.          IMPRESSION: Mild hallux valgus deformity.     LISSETTE HAHN MD    RADIOGRAPHS LEFT FOOT 08/29/2018  FINDINGS: There has been previous bunion surgery with postsurgical  changes in the first metatarsal. There is no significant hallux valgus  deformity. There is mild degenerative change of the first metatarsal  phalangeal joint.     No fracture, dislocation or other significant degenerative change is  seen.          IMPRESSION: Postsurgical changes in the first metatarsal.     LISSETTE HAHN MD  ============================================================     ASSESSMENT:  (M20.11) Hallux valgus (acquired), right foot  (primary encounter diagnosis)     (M79.671) Foot pain, right     (I83.93) Varicose veins of both lower extremities       PLAN:  -Patient evaluated and examined. Treatment options discussed with no educational barriers noted.  -Advised patient  "to wear compression socks to prevent edema and slow the progression of further varicose veins and telangiectasias. Patient has compression socks at home but she doesn't wear them as often in the summer.   -Reviewed Radiographs WB 3 views of b/l feet ordered today  -Reviewed risks and benefits of a bunion surgery. Educated patient about potential recurrence of the deformity as well as potential for a post-operative infection. She has exhausted conservative treatment and she would like to proceed with surgical correction.  -DME order for knee scooter rental for up to eight weeks from the date of the surgery.  -Discussed post-op course with patient. She will be NWB in a CAM boot. 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 She will remain 100% NWB with a CAM boot and crutches for two weeks with minimal weight on her heel only while wearing a CAM boot at the salon. She states she may work for a couple hours then elevate her foot for a while in between customers. She has agreed to not removing the CAM boot. She will slowly progress to WB in her CAM boot between weeks 4 and 6, then potentially transition to a stable tennis shoe from week 6 to 8 based on the progression of her heeling.  -CAM boot to be dispensed in PACU  -Percocet 5/325mg to be dispensed in PACU  -Patient already has crutches  -Surgery: 10/22/2018 \"Right foot 1st metatarsal distal chevron osteotomy and lateral soft tissue release\"  -Post-op #1 Thursday, 10/25/2018  -All of patients questions were answered and she was in agreement with the above treatment plan           Netta Soni DPM      Sincerely,        Netta Soni DPM    "

## 2018-10-10 NOTE — LETTER
October 10, 2018      Luly Freeman  [address]  [facility] [address]        To Whom It May Concern:    Luly Freeman  was seen by podiatry on 10/10/2018. She will be having foot surgery on Monday, 10/22/2018, and she has been instructed to be 100% non-weightbearing. She will be using crutches or a knee scooter and a CAM boot for a minimum of six weeks from the date of the surgery. Please excuse her from all duties that require her to place any weight on her foot.           Sincerely,        Netta Soni DPM

## 2018-10-10 NOTE — MR AVS SNAPSHOT
After Visit Summary   10/10/2018    Luyl Freeman    MRN: [id number]           Patient Information     Date Of Birth          1965        Visit Information        Provider Department      10/10/2018 2:00 PM Netta Soni DPM [facility]         Follow-ups after your visit        Your next 10 appointments already scheduled     Oct 15, 2018 10:00 AM CDT   (Arrive by 9:45 AM)   Office Visit with Tere Herrera MD   [facility] [place] ([facility] [place] )    [address]  [place] [address]   [phone]           Bring a current list of meds and any records pertaining to this visit.  For Physicals, please bring immunization records and any forms needing to be filled out.  Please arrive 15 minutes early to complete paperwork and register.            Oct 25, 2018 10:30 AM CDT   (Arrive by 10:15 AM)   Return Visit with Netta Soni DPM   [facility] [place] ([facility] [place] [facility]    [address]  [place] [id number] [phone]              Who to contact     If you have questions or need follow up information about today's clinic visit or your schedule please contact [facility] directly at [phone].  Normal or non-critical lab and imaging results will be communicated to you by MyChart, letter or phone within 4 business days after the clinic has received the results. If you do not hear from us within 7 days, please contact the clinic through MyChart or phone. If you have a critical or abnormal lab result, we will notify you by phone as soon as possible.  Submit refill requests through [employer] or call your pharmacy and they will forward the refill request to us. Please allow 3 business days for your refill to be completed.          Additional Information About Your Visit        [employer] Information     [employer] gives you secure access to your electronic health record. If  you see a primary care provider, you can also send messages to your care team and make appointments. If you have questions, please call your primary care clinic.  If you do not have a primary care provider, please call [phone] and they will assist you.        Care EveryWhere ID     This is your Care EveryWhere ID. This could be used by other organizations to access your [place] medical records  [id number]        Your Vitals Were     Pulse Temperature Respirations             73 97.6  F (36.4  C) (Tympanic) 12          Blood Pressure from Last 3 Encounters:   10/10/18 105/71   08/29/18 106/70   02/09/18 102/60    Weight from Last 3 Encounters:   02/09/18 160 lb (72.6 kg)   01/19/18 160 lb (72.6 kg)   10/30/17 160 lb 3.2 oz (72.7 kg)              Today, you had the following     No orders found for display       Primary Care Provider Office Phone # Fax #    Tere Herrera [phone]       [facility] [id number]        Equal Access to Services     [facility]: Hadii aad ku hadasho Soomaali, waaxda luqadaha, qaybta kaalmada adeegyalydia, suleman ng . So [facility] [phone].    ATENCIÓN: Si habla español, tiene a morocho disposición servicios gratuitos de asistencia lingüística. [facility] [phone].    We comply with applicable federal civil rights laws and [place] laws. We do not discriminate on the basis of race, color, national origin, age, disability, sex, sexual orientation, or gender identity.            Thank you!     Thank you for choosing [facility]  for your care. Our goal is always to provide you with excellent care. Hearing back from our patients is one way we can continue to improve our services. Please take a few minutes to complete the written survey that you may receive in the mail after your visit with us. Thank you!             Your Updated Medication List - Protect others around you: Learn how to safely use,  store and throw away your medicines at www.disposemymeds.org.          This list is accurate as of 10/10/18  2:47 PM.  Always use your most recent med list.                   Brand Name Dispense Instructions for use Diagnosis    budesonide 0.5 MG/2ML neb solution    PULMICORT    2 Box    Make 240 cc Aj med sinus irrigation Mix 2 ml vial of budesonide 0.5 mg Rinse BID for 2 weeks    Perennial allergic rhinitis, Seasonal allergic rhinitis, unspecified chronicity, unspecified trigger       * EPINEPHrine 0.3 MG/0.3ML injection    AUVI-Q    2 each    Inject 0.3 mLs into the muscle once as needed for anaphylaxis for 1 dose.    Anaphylactic reaction       * EPINEPHrine 0.3 MG/0.3ML injection 2-pack    EPIPEN/ADRENACLICK/or ANY BX GENERIC EQUIV    0.6 mL    Inject 0.3 mLs (0.3 mg) into the muscle as needed for anaphylaxis    Perennial allergic rhinitis, Allergic reaction, subsequent encounter       fexofenadine 180 MG tablet    ALLEGRA    30 tablet    Take 1 tablet by mouth every morning.        fluticasone 50 MCG/ACT spray    FLONASE     Spray 2 sprays into both nostrils daily        PROAIR  (90 Base) MCG/ACT inhaler   Generic drug:  albuterol     8.5 g    INHALE 2 PUFFS BY MOUTH EVERY 6 HOURS AS NEEDED FOR SHORTNESS OF BREATH    Acute bronchitis       SUBLINGUAL IMMUNOTHERAPY (SLIT)     1 Bottle    Complete current vial at TID dosing.  Next vial, decrease to BID x 1 vial.  Then one drop daily.    Perennial allergic rhinitis       triamcinolone 55 MCG/ACT inhaler    NASACORT AQ    2 Bottle    Spray 2 sprays into both nostrils daily    Perennial allergic rhinitis, Seasonal allergic rhinitis, unspecified chronicity, unspecified trigger, History of myringotomy, Dysfunction of left eustachian tube       * Notice:  This list has 2 medication(s) that are the same as other medications prescribed for you. Read the directions carefully, and ask your doctor or other care provider to review them with you.

## 2018-10-11 NOTE — PATIENT INSTRUCTIONS
Before Your Surgery      Call your surgeon if there is any change in your health. This includes signs of a cold or flu (such as a sore throat, runny nose, cough, rash or fever).    Do not smoke, drink alcohol or take over the counter medicine (unless your surgeon or primary care doctor tells you to) for the 24 hours before and after surgery.    If you take prescribed drugs: Follow your doctor s orders about which medicines to take and which to stop until after surgery.    Eating and drinking prior to surgery: follow the instructions from your surgeon    Take a shower or bath the night before surgery. Use the soap your surgeon gave you to gently clean your skin. If you do not have soap from your surgeon, use your regular soap. Do not shave or scrub the surgery site.  Wear clean pajamas and have clean sheets on your bed.     Nothing to eat or drink after midnight the night prior to surgery

## 2018-10-11 NOTE — PROGRESS NOTES
[facility]  [address]  [place] [address]  [phone]  Dept: [phone]    PRE-OP EVALUATION:  Today's date: 10/15/2018    Luly Freeman (: 1965) presents for pre-operative evaluation assessment as requested by Dr. Soni.  She requires evaluation and anesthesia risk assessment prior to undergoing surgery/procedure for treatment of RIGHT FOOT 1ST METATARSAL DISTAL CHEVERON OSTEOTOMY AND LATERAL SOFT FISSUE RELEASE .    Proposed Surgery/ Procedure: Bunionectomy   Date of Surgery/ Procedure: 10/22/18  Time of Surgery/ Procedure: [facility]  [place] [facility] [place] for surgical facility:   Primary Physician: Tree Herrera  Type of Anesthesia Anticipated: to be determined    Patient has a Health Care Directive or Living Will:  NO    1. NO - Do you have a history of heart attack, stroke, stent, bypass or surgery on an artery in the head, neck, heart or legs?  2. NO - Do you ever have any pain or discomfort in your chest?  3. NO - Do you have a history of  Heart Failure?  4. NO - Are you troubled by shortness of breath when: walking on the level, up a slight hill or at night?  5. NO - Do you currently have a cold, bronchitis or other respiratory infection?  6. NO - Do you have a cough, shortness of breath or wheezing?  7. NO - Do you sometimes get pains in the calves of your legs when you walk?  8. NO - Do you or anyone in your family have previous history of blood clots?  9. NO - Do you or does anyone in your family have a serious bleeding problem such as prolonged bleeding following surgeries or cuts?  10. NO - Have you ever had problems with anemia or been told to take iron pills?  11. NO - Have you had any abnormal blood loss such as black, tarry or bloody stools, or abnormal vaginal bleeding?  12. NO - Have you ever had a blood transfusion?  13. NO - Have you or any of your relatives ever had problems with anesthesia?  14. NO - Do you have sleep apnea,  excessive snoring or daytime drowsiness?  15. NO - Do you have any prosthetic heart valves?  16. NO - Do you have prosthetic joints?  17. NO - Is there any chance that you may be pregnant?      HPI:     HPI related to upcoming procedure: painful right bunion          MEDICAL HISTORY:     Patient Active Problem List    Diagnosis Date Noted     ACP (advance care planning) 2016     Priority: Medium     Advance Care Planning 2016: ACP Review of Chart / Resources Provided:  Reviewed chart for advance care plan.  Luly Freeman has no plan or code status on file. Discussed available resources and provided with information. Confirmed code status reflects current choices pending further ACP discussions.  Confirmed/documented legally designated decision makers.  Added by Judith Richey             Multiple respiratory allergies 2013     Priority: Medium      Past Medical History:   Diagnosis Date     Allergic rhinitis 2011     Lump or mass in breast 2007     PONV (postoperative nausea and vomiting)      Recurrent sinusitis 2011     Varicose veins 2011     Past Surgical History:   Procedure Laterality Date     BUNIONECTOMY RT/LT  2008     COLONOSCOPY N/A 10/30/2017    Procedure: COLONOSCOPY;  WHOLE COLON COLONOSCOPY ;  Surgeon: Erma Jenkins MD;  Location: [facility] OR     ENT SURGERY  10/2013    nose surgery, Dr. Elliott     EXCISE MASS LOWER EXTREMITY Right 2017    Procedure: EXCISE MASS LOWER EXTREMITY;  EXCISION SOFT TISSUE MASS RIGHT LOWER LEG;  Surgeon: Erma Jenkins MD;  Location: [facility] OR            laparoscopic supracervical hysterectomy      Fibroids, ovaries intact     Left Breast Bx  3/2007    Fibrocystic breast disease     PE tube placement       ventilation tube, left       Current Outpatient Prescriptions   Medication Sig Dispense Refill     ALBUTEROL 108 (90 BASE) MCG/ACT inhaler INHALE 2 PUFFS BY MOUTH EVERY 6 HOURS AS NEEDED FOR SHORTNESS OF  BREATH (Patient not taking: Reported on 10/10/2018) 8.5 g 0     budesonide (PULMICORT) 0.5 MG/2ML neb solution Make 240 cc Aj med sinus irrigation Mix 2 ml vial of budesonide 0.5 mg Rinse BID for 2 weeks (Patient not taking: Reported on 8/29/2018) 2 Box 1     EPINEPHrine (AUVI-Q) 0.3 MG/0.3ML injection Inject 0.3 mLs into the muscle once as needed for anaphylaxis for 1 dose. 2 each 2     EPINEPHrine (EPIPEN/ADRENACLICK/OR ANY BX GENERIC EQUIV) 0.3 MG/0.3ML injection 2-pack Inject 0.3 mLs (0.3 mg) into the muscle as needed for anaphylaxis 0.6 mL 1     fexofenadine (ALLEGRA) 180 MG tablet Take 1 tablet by mouth every morning. (Patient not taking: Reported on 8/29/2018) 30 tablet 11     fluticasone (FLONASE) 50 MCG/ACT nasal spray Spray 2 sprays into both nostrils daily       order for DME Equipment being ordered: Please dispense a knee scooter for the patient to rent. She has foot surgery on 10/22/2018 and she will be picking up the knee scooter prior to the surgery. She will need the scooter for up to eight weeks from the date of the surgery. Thank you. 1 Device 0     SUBLINGUAL IMMUNOTHERAPY, SLIT, Complete current vial at TID dosing.  Next vial, decrease to BID x 1 vial.  Then one drop daily. (Patient not taking: Reported on 10/10/2018) 1 Bottle 3     triamcinolone (NASACORT AQ) 55 MCG/ACT Inhaler Spray 2 sprays into both nostrils daily (Patient not taking: Reported on 8/29/2018) 2 Bottle 3     OTC products: None, except as noted above    Allergies   Allergen Reactions     Seasonal Allergies       Latex Allergy: NO    Social History   Substance Use Topics     Smoking status: Never Smoker     Smokeless tobacco: Never Used      Comment: no passive exposure     Alcohol use Yes      Comment: rarely     History   Drug Use No       REVIEW OF SYSTEMS:   CONSTITUTIONAL: NEGATIVE for fever, chills, change in weight  INTEGUMENTARY/SKIN: NEGATIVE for worrisome rashes, moles or lesions  EYES: NEGATIVE for vision changes or  irritation  ENT/MOUTH: NEGATIVE for ear, mouth and throat problems  RESP: NEGATIVE for significant cough or SOB  BREAST: NEGATIVE for masses, tenderness or discharge  CV: NEGATIVE for chest pain, palpitations or peripheral edema  GI: NEGATIVE for nausea, abdominal pain, heartburn, or change in bowel habits  : NEGATIVE for frequency, dysuria, or hematuria  MUSCULOSKELETAL: NEGATIVE for significant arthralgias or myalgia  NEURO: NEGATIVE for weakness, dizziness or paresthesias  ENDOCRINE: NEGATIVE for temperature intolerance, skin/hair changes  HEME: NEGATIVE for bleeding problems  PSYCHIATRIC: NEGATIVE for changes in mood or affect    EXAM:   There were no vitals taken for this visit.    GENERAL APPEARANCE: healthy, alert and no distress     EYES: EOMI, PERRL     HENT: ear canals and TM's normal and nose and mouth without ulcers or lesions     NECK: no adenopathy, no asymmetry, masses, or scars and thyroid normal to palpation     RESP: lungs clear to auscultation - no rales, rhonchi or wheezes     CV: regular rates and rhythm, normal S1 S2, no S3 or S4 and no murmur, click or rub     ABDOMEN:  soft, nontender, no HSM or masses and bowel sounds normal     MS: extremities normal- no gross deformities noted, no evidence of inflammation in joints, FROM in all extremities. Right bunion     SKIN: no suspicious lesions or rashes     NEURO: Normal strength and tone, sensory exam grossly normal, mentation intact and speech normal     PSYCH: mentation appears normal. and affect normal/bright     LYMPHATICS: No cervical adenopathy    DIAGNOSTICS:     EKG: appears normal, NSR, normal axis, normal intervals, no acute ST/T changes c/w ischemia, no LVH by voltage criteria  Labs Resulted Today:   Results for orders placed or performed in visit on 08/29/18   XR FOOT RT G/E 3 VW (Clinic Performed)    Narrative    PROCEDURE: XR FOOT RT G/E 3 VW 8/29/2018 9:43 AM    HISTORY: WEIGHTBEARING please -- pre-op for RIGHT foot bunion  surgery;  Hallux valgus (acquired), right foot    COMPARISONS: None.    TECHNIQUE: 3 views.    FINDINGS: There is a mild hallux valgus deformity. There is no  significant degenerative change in the first metatarsal phalangeal  joint.    No fracture or dislocation is seen. There is no significant  degenerative change.         Impression    IMPRESSION: Mild hallux valgus deformity.    LISSETTE HAHN MD       Recent Labs   Lab Test  05/15/17   1147   HGB  12.9   PLT  225        IMPRESSION:   Reason for surgery/procedure: painful right bunion    The proposed surgical procedure is considered INTERMEDIATE risk.    REVISED CARDIAC RISK INDEX  The patient has the following serious cardiovascular risks for perioperative complications such as (MI, PE, VFib and 3  AV Block):  No serious cardiac risks  INTERPRETATION: 0 risks: Class I (very low risk - 0.4% complication rate)    The patient has the following additional risks for perioperative complications:  No identified additional risks      ICD-10-CM    1. Preop general physical exam Z01.818 EKG 12-lead complete w/read - (Clinic Performed)     CBC with platelets and differential     Comprehensive metabolic panel   2. Bunion, right M21.611        RECOMMENDATIONS:       Cardiovascular Risk  Performs 4 METs exercise without symptoms (Light housework (dusting, washing dishes), Climb a flight of stairs and Walk on level ground at 15 minutes per mile (4 miles/hour)) .       No medications to take     APPROVAL GIVEN to proceed with proposed procedure, without further diagnostic evaluation       Signed Electronically by: Tere Herrera MD    Copy of this evaluation report is provided to requesting physician.    [facility] Preop Guidelines    Revised Cardiac Risk Index

## 2018-10-11 NOTE — H&P (VIEW-ONLY)
"Chief complaint: Patient presents with:  Pre-Op Exam: right foot bunion      History of Present Illness: This 53 year old female is seen for the first time by podiatry at [facility] for evaluation and suggestions of management of a RIGHT foot bunion deformity. She had seen Dr. Chi and she was scheduled for surgery in December, 2017, but her  changed jobs and there was a change in her insurance during that time. She would still like to have the bunion corrected and is here to discuss her surgical procedure and post-operative course.    She has had the bunion for a \"long time\" and it is very painful. It sometimes feels like a \"bee sting\" and can be very painful with the worst pain almost bringing tears. She wears custom orthotics and the inserts have been modified, but they do not significantly decrease her pain. The patient works on her feet all day for 12-hour shifts at a [employer] and one shift a week at [employer]. Regular walking isn't too painful, but bending or squatting makes the toe feel like it's going to \"crack\" and can suddenly cause a pain in the joint. No further pedal complaints at this time.     Patient has a history of a LEFT bunion surgical correction that was performed locally, but it was 10+ years ago. She has no complaints or pain with the left foot today.    /71 (BP Location: Right arm, Patient Position: Sitting, Cuff Size: Adult Regular)  Pulse 73  Temp 97.6  F (36.4  C) (Tympanic)  Resp 12    Patient Active Problem List   Diagnosis     Multiple respiratory allergies     ACP (advance care planning)       Past Surgical History:   Procedure Laterality Date     BUNIONECTOMY RT/LT  4/2008     COLONOSCOPY N/A 10/30/2017    Procedure: COLONOSCOPY;  WHOLE COLON COLONOSCOPY ;  Surgeon: Erma Jenkins MD;  Location: [facility]     ENT SURGERY  10/2013    nose surgery, Dr. Elliott     EXCISE MASS LOWER EXTREMITY Right 5/22/2017    Procedure: EXCISE MASS LOWER EXTREMITY;  EXCISION SOFT TISSUE " MASS RIGHT LOWER LEG;  Surgeon: Erma Jenkins MD;  Location: [facility] OR            laparoscopic supracervical hysterectomy      Fibroids, ovaries intact     Left Breast Bx  3/2007    Fibrocystic breast disease     PE tube placement       ventilation tube, left         Current Outpatient Prescriptions   Medication     EPINEPHrine (AUVI-Q) 0.3 MG/0.3ML injection     EPINEPHrine (EPIPEN/ADRENACLICK/OR ANY BX GENERIC EQUIV) 0.3 MG/0.3ML injection 2-pack     order for DME     ALBUTEROL 108 (90 BASE) MCG/ACT inhaler     budesonide (PULMICORT) 0.5 MG/2ML neb solution     fexofenadine (ALLEGRA) 180 MG tablet     fluticasone (FLONASE) 50 MCG/ACT nasal spray     SUBLINGUAL IMMUNOTHERAPY, SLIT,     triamcinolone (NASACORT AQ) 55 MCG/ACT Inhaler     No current facility-administered medications for this visit.           Allergies   Allergen Reactions     Seasonal Allergies        Family History   Problem Relation Age of Onset     C.A.D. Father      Diabetes No family hx of      Cerebrovascular Disease No family hx of        Social History     Social History     Marital status:      Spouse name: N/A     Number of children: N/A     Years of education: N/A     Occupational History     cosmotologist      Full-time     Social History Main Topics     Smoking status: Never Smoker     Smokeless tobacco: Never Used      Comment: no passive exposure     Alcohol use Yes      Comment: rarely     Drug use: No     Sexual activity: Not on file     Other Topics Concern     Caffeine Concern Yes     coffee, 1 cup daily     Parent/Sibling W/ Cabg, Mi Or Angioplasty Before 65f 55m? No     Social History Narrative       ROS: 10 point ROS neg other than the symptoms noted above in the HPI.  EXAM  Constitutional: healthy, alert and no distress     Psychiatric: mentation appears normal and affect normal/bright     VASCULAR:  -Dorsalis pedis pulse +2/4 b/l  -Posterior tibial pulse +2/4 b/l  -Telangiectasias and varicosities present  to b/l ankles and posterior legs  NEURO:  -Light touch sensation intact to b/l plantar forefoot  DERM:  -Skin temperature, texture and turgor WNL b/l  -Toenails normotrophic x 10  MSK:  -Pain on palpation to medial RIGHT 1st MTPJ  -Medial and dorsal prominence to RIGHT 1st MTPJ with mild, flexible lateral deviation of hallux  -ROM WNL for RIGHT 1st MTPJ without forefoot loading  -No crepitus with passive ROM of RIGHT hallux with tenderness at end ROM dorsiflexion and plantarflexion  -No hypermobility noted to 1st ray of RIGHT foot  -Muscle strength +5/5 for dorsiflexion, plantarflexion, ABDUction and ADDuction b/l  -WB reveals proper arch retention b/l from 08/29/2018  -RCSP 1 degree valgus b/l from 08/29/2018    RADIOGRAPHS RIGHT FOOT 08/29/2018     FINDINGS: There is a mild hallux valgus deformity. There is no  significant degenerative change in the first metatarsal phalangeal  joint.     No fracture or dislocation is seen. There is no significant  degenerative change.          IMPRESSION: Mild hallux valgus deformity.     LISSETTE HAHN MD    RADIOGRAPHS LEFT FOOT 08/29/2018  FINDINGS: There has been previous bunion surgery with postsurgical  changes in the first metatarsal. There is no significant hallux valgus  deformity. There is mild degenerative change of the first metatarsal  phalangeal joint.     No fracture, dislocation or other significant degenerative change is  seen.          IMPRESSION: Postsurgical changes in the first metatarsal.     LISSETTE HAHN MD  ============================================================     ASSESSMENT:  (M20.11) Hallux valgus (acquired), right foot  (primary encounter diagnosis)     (M79.671) Foot pain, right     (I83.93) Varicose veins of both lower extremities       PLAN:  -Patient evaluated and examined. Treatment options discussed with no educational barriers noted.  -Advised patient to wear compression socks to prevent edema and slow the progression of further  "varicose veins and telangiectasias. Patient has compression socks at home but she doesn't wear them as often in the summer.   -Reviewed Radiographs WB 3 views of b/l feet ordered today  -Reviewed risks and benefits of a bunion surgery. Educated patient about potential recurrence of the deformity as well as potential for a post-operative infection. She has exhausted conservative treatment and she would like to proceed with surgical correction.  -DME order for knee scooter rental for up to eight weeks from the date of the surgery.  -Discussed post-op course with patient. She will be NWB in a CAM boot. 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 She will remain 100% NWB with a CAM boot and crutches for two weeks with minimal weight on her heel only while wearing a CAM boot at the salon. She states she may work for a couple hours then elevate her foot for a while in between customers. She has agreed to not removing the CAM boot. She will slowly progress to WB in her CAM boot between weeks 4 and 6, then potentially transition to a stable tennis shoe from week 6 to 8 based on the progression of her heeling.  -CAM boot to be dispensed in PACU  -Percocet 5/325mg to be dispensed in PACU  -Patient already has crutches  -Surgery: 10/22/2018 \"Right foot 1st metatarsal distal chevron osteotomy and lateral soft tissue release\"  -Post-op #1 Thursday, 10/25/2018  -All of patients questions were answered and she was in agreement with the above treatment plan           Netta Soni DPM  "

## 2018-10-15 ENCOUNTER — OFFICE VISIT (OUTPATIENT)
Dept: FAMILY MEDICINE | Facility: OTHER | Age: 53
End: 2018-10-15
Attending: FAMILY MEDICINE
Payer: COMMERCIAL

## 2018-10-15 VITALS
WEIGHT: 160 LBS | SYSTOLIC BLOOD PRESSURE: 122 MMHG | HEART RATE: 76 BPM | BODY MASS INDEX: 24.33 KG/M2 | OXYGEN SATURATION: 100 % | DIASTOLIC BLOOD PRESSURE: 62 MMHG | RESPIRATION RATE: 19 BRPM

## 2018-10-15 DIAGNOSIS — M21.611 BUNION, RIGHT: ICD-10-CM

## 2018-10-15 DIAGNOSIS — Z01.818 PREOP GENERAL PHYSICAL EXAM: Primary | ICD-10-CM

## 2018-10-15 LAB
ALBUMIN SERPL-MCNC: 3.6 G/DL (ref 3.4–5)
ALP SERPL-CCNC: 52 U/L (ref 40–150)
ALT SERPL W P-5'-P-CCNC: 20 U/L (ref 0–50)
ANION GAP SERPL CALCULATED.3IONS-SCNC: 8 MMOL/L (ref 3–14)
AST SERPL W P-5'-P-CCNC: 16 U/L (ref 0–45)
BASOPHILS # BLD AUTO: 0 10E9/L (ref 0–0.2)
BASOPHILS NFR BLD AUTO: 0.5 %
BILIRUB SERPL-MCNC: 0.8 MG/DL (ref 0.2–1.3)
BUN SERPL-MCNC: 18 MG/DL (ref 7–30)
CALCIUM SERPL-MCNC: 8.8 MG/DL (ref 8.5–10.1)
CHLORIDE SERPL-SCNC: 106 MMOL/L (ref 94–109)
CO2 SERPL-SCNC: 27 MMOL/L (ref 20–32)
CREAT SERPL-MCNC: 0.87 MG/DL (ref 0.52–1.04)
DIFFERENTIAL METHOD BLD: NORMAL
EOSINOPHIL # BLD AUTO: 0.1 10E9/L (ref 0–0.7)
EOSINOPHIL NFR BLD AUTO: 1.9 %
ERYTHROCYTE [DISTWIDTH] IN BLOOD BY AUTOMATED COUNT: 12 % (ref 10–15)
GFR SERPL CREATININE-BSD FRML MDRD: 69 ML/MIN/1.7M2
GLUCOSE SERPL-MCNC: 96 MG/DL (ref 70–99)
HCT VFR BLD AUTO: 38.2 % (ref 35–47)
HGB BLD-MCNC: 12.9 G/DL (ref 11.7–15.7)
IMM GRANULOCYTES # BLD: 0 10E9/L (ref 0–0.4)
IMM GRANULOCYTES NFR BLD: 0.2 %
LYMPHOCYTES # BLD AUTO: 2.4 10E9/L (ref 0.8–5.3)
LYMPHOCYTES NFR BLD AUTO: 40.8 %
MCH RBC QN AUTO: 31.2 PG (ref 26.5–33)
MCHC RBC AUTO-ENTMCNC: 33.8 G/DL (ref 31.5–36.5)
MCV RBC AUTO: 92 FL (ref 78–100)
MONOCYTES # BLD AUTO: 0.5 10E9/L (ref 0–1.3)
MONOCYTES NFR BLD AUTO: 8.5 %
NEUTROPHILS # BLD AUTO: 2.9 10E9/L (ref 1.6–8.3)
NEUTROPHILS NFR BLD AUTO: 48.1 %
NRBC # BLD AUTO: 0 10*3/UL
NRBC BLD AUTO-RTO: 0 /100
PLATELET # BLD AUTO: 249 10E9/L (ref 150–450)
POTASSIUM SERPL-SCNC: 4.1 MMOL/L (ref 3.4–5.3)
PROT SERPL-MCNC: 7.4 G/DL (ref 6.8–8.8)
RBC # BLD AUTO: 4.14 10E12/L (ref 3.8–5.2)
SODIUM SERPL-SCNC: 141 MMOL/L (ref 133–144)
WBC # BLD AUTO: 5.9 10E9/L (ref 4–11)

## 2018-10-15 PROCEDURE — 99214 OFFICE O/P EST MOD 30 MIN: CPT | Mod: 25 | Performed by: FAMILY MEDICINE

## 2018-10-15 PROCEDURE — 36415 COLL VENOUS BLD VENIPUNCTURE: CPT | Performed by: FAMILY MEDICINE

## 2018-10-15 PROCEDURE — 85025 COMPLETE CBC W/AUTO DIFF WBC: CPT | Performed by: FAMILY MEDICINE

## 2018-10-15 PROCEDURE — 80053 COMPREHEN METABOLIC PANEL: CPT | Performed by: FAMILY MEDICINE

## 2018-10-15 PROCEDURE — 93000 ELECTROCARDIOGRAM COMPLETE: CPT | Performed by: INTERNAL MEDICINE

## 2018-10-15 ASSESSMENT — ANXIETY QUESTIONNAIRES
1. FEELING NERVOUS, ANXIOUS, OR ON EDGE: NOT AT ALL
4. TROUBLE RELAXING: NOT AT ALL
7. FEELING AFRAID AS IF SOMETHING AWFUL MIGHT HAPPEN: NOT AT ALL
5. BEING SO RESTLESS THAT IT IS HARD TO SIT STILL: NOT AT ALL
6. BECOMING EASILY ANNOYED OR IRRITABLE: SEVERAL DAYS
3. WORRYING TOO MUCH ABOUT DIFFERENT THINGS: NOT AT ALL
GAD7 TOTAL SCORE: 1
IF YOU CHECKED OFF ANY PROBLEMS ON THIS QUESTIONNAIRE, HOW DIFFICULT HAVE THESE PROBLEMS MADE IT FOR YOU TO DO YOUR WORK, TAKE CARE OF THINGS AT HOME, OR GET ALONG WITH OTHER PEOPLE: NOT DIFFICULT AT ALL
2. NOT BEING ABLE TO STOP OR CONTROL WORRYING: NOT AT ALL

## 2018-10-15 ASSESSMENT — PAIN SCALES - GENERAL: PAINLEVEL: NO PAIN (0)

## 2018-10-15 NOTE — MR AVS SNAPSHOT
After Visit Summary   10/15/2018    Luly Freeman    MRN: [id number]           Patient Information     Date Of Birth          1965        Visit Information        Provider Department      10/15/2018 10:00 AM Tere Herrera MD [facility]        Today's Diagnoses     Preop general physical exam    -  1    Bunion, right          Care Instructions      Before Your Surgery      Call your surgeon if there is any change in your health. This includes signs of a cold or flu (such as a sore throat, runny nose, cough, rash or fever).    Do not smoke, drink alcohol or take over the counter medicine (unless your surgeon or primary care doctor tells you to) for the 24 hours before and after surgery.    If you take prescribed drugs: Follow your doctor s orders about which medicines to take and which to stop until after surgery.    Eating and drinking prior to surgery: follow the instructions from your surgeon    Take a shower or bath the night before surgery. Use the soap your surgeon gave you to gently clean your skin. If you do not have soap from your surgeon, use your regular soap. Do not shave or scrub the surgery site.  Wear clean pajamas and have clean sheets on your bed.     Nothing to eat or drink after midnight the night prior to surgery          Follow-ups after your visit        Your next 10 appointments already scheduled     Oct 22, 2018   Procedure with Netta Soni DPM   [facility] ([address] [id number] [phone]            Oct 25, 2018 10:30 AM CDT   (Arrive by 10:15 AM)   Return Visit with Netta Soni DPM   [facility]    [address] [place] [id number] [phone]              Who to contact     If you have questions or need follow up information about today's clinic visit or your schedule please contact GWEN BENNETT  [facility] directly at [phone].  Normal or non-critical lab and imaging results will be communicated to you by MyChart, letter or phone within 4 business days after the clinic has received the results. If you do not hear from us within 7 days, please contact the clinic through [employer] or phone. If you have a critical or abnormal lab result, we will notify you by phone as soon as possible.  Submit refill requests through [employer] or call your pharmacy and they will forward the refill request to us. Please allow 3 business days for your refill to be completed.          Additional Information About Your Visit        [employer] Information     [employer] gives you secure access to your electronic health record. If you see a primary care provider, you can also send messages to your care team and make appointments. If you have questions, please call your primary care clinic.  If you do not have a primary care provider, please call [phone] and they will assist you.        Care EveryWhere ID     This is your Care EveryWhere ID. This could be used by other organizations to access your [facility] medical records  [id number]        Your Vitals Were     Pulse Respirations Pulse Oximetry Breastfeeding? BMI (Body Mass Index)       76 19 100% No 24.33 kg/m2        Blood Pressure from Last 3 Encounters:   10/15/18 122/62   10/10/18 105/71   08/29/18 106/70    Weight from Last 3 Encounters:   10/15/18 160 lb (72.6 kg)   02/09/18 160 lb (72.6 kg)   01/19/18 160 lb (72.6 kg)              We Performed the Following     CBC with platelets and differential     Comprehensive metabolic panel     EKG 12-lead complete w/read - (Clinic Performed)        Primary Care Provider Office Phone # Fax #    Tere Herrera [phone] [address] [facility] [address]        Equal Access to Services     ORION ALATORRE : Stefania Dodd, ana joseph, suleman teresa  kisha hu laAstonaajeb ah. So [facility] [phone].    ATENCIÓN: Si romy colvin, tiene a morocho disposición servicios gratuitos de asistencia lingüística. [employer] [phone].    We comply with applicable federal civil rights laws and [place] laws. We do not discriminate on the basis of race, color, national origin, age, disability, sex, sexual orientation, or gender identity.            Thank you!     Thank you for choosing [facility]  for your care. Our goal is always to provide you with excellent care. Hearing back from our patients is one way we can continue to improve our services. Please take a few minutes to complete the written survey that you may receive in the mail after your visit with us. Thank you!             Your Updated Medication List - Protect others around you: Learn how to safely use, store and throw away your medicines at www.disposemymeds.org.          This list is accurate as of 10/15/18 10:43 AM.  Always use your most recent med list.                   Brand Name Dispense Instructions for use Diagnosis    fexofenadine 180 MG tablet    ALLEGRA    30 tablet    Take 1 tablet by mouth every morning.        fluticasone 50 MCG/ACT spray    FLONASE     Spray 2 sprays into both nostrils daily        order for DME     1 Device    Equipment being ordered: Please dispense a knee scooter for the patient to rent. She has foot surgery on 10/22/2018 and she will be picking up the knee scooter prior to the surgery. She will need the scooter for up to eight weeks from the date of the surgery. Thank you.    Hallux valgus (acquired), right foot, Foot pain, right       PROAIR  (90 Base) MCG/ACT inhaler   Generic drug:  albuterol     8.5 g    INHALE 2 PUFFS BY MOUTH EVERY 6 HOURS AS NEEDED FOR SHORTNESS OF BREATH    Acute bronchitis       triamcinolone 55 MCG/ACT inhaler    NASACORT AQ    2 Bottle    Spray 2 sprays into both nostrils daily    Perennial allergic rhinitis, Seasonal  allergic rhinitis, unspecified chronicity, unspecified trigger, History of myringotomy, Dysfunction of left eustachian tube

## 2018-10-15 NOTE — NURSING NOTE
"Chief Complaint   Patient presents with     Pre-Op Exam     Flu Shot     declined       Initial /62  Pulse 76  Resp 19  Wt 160 lb (72.6 kg)  SpO2 100%  Breastfeeding? No  BMI 24.33 kg/m2 Estimated body mass index is 24.33 kg/(m^2) as calculated from the following:    Height as of 2/9/18: 5' 8\" (1.727 m).    Weight as of this encounter: 160 lb (72.6 kg).  Medication Reconciliation: complete    Kimberly Ham LPN    "

## 2018-10-16 ASSESSMENT — PATIENT HEALTH QUESTIONNAIRE - PHQ9: SUM OF ALL RESPONSES TO PHQ QUESTIONS 1-9: 3

## 2018-10-16 ASSESSMENT — ANXIETY QUESTIONNAIRES: GAD7 TOTAL SCORE: 1

## 2018-10-16 NOTE — H&P (VIEW-ONLY)
[facility]  [address]  [place] [address]  [phone]  Dept: [phone]    PRE-OP EVALUATION:  Today's date: 10/15/2018    Luly Freeman (: 1965) presents for pre-operative evaluation assessment as requested by Dr. Soni.  She requires evaluation and anesthesia risk assessment prior to undergoing surgery/procedure for treatment of RIGHT FOOT 1ST METATARSAL DISTAL CHEVERON OSTEOTOMY AND LATERAL SOFT FISSUE RELEASE .    Proposed Surgery/ Procedure: Bunionectomy   Date of Surgery/ Procedure: 10/22/18  Time of Surgery/ Procedure: [facility]  Hospital/Surgical Facility: [facility]  Fax number for surgical facility:   Primary Physician: Tere Herrera  Type of Anesthesia Anticipated: to be determined    Patient has a Health Care Directive or Living Will:  NO    1. NO - Do you have a history of heart attack, stroke, stent, bypass or surgery on an artery in the head, neck, heart or legs?  2. NO - Do you ever have any pain or discomfort in your chest?  3. NO - Do you have a history of  Heart Failure?  4. NO - Are you troubled by shortness of breath when: walking on the level, up a slight hill or at night?  5. NO - Do you currently have a cold, bronchitis or other respiratory infection?  6. NO - Do you have a cough, shortness of breath or wheezing?  7. NO - Do you sometimes get pains in the calves of your legs when you walk?  8. NO - Do you or anyone in your family have previous history of blood clots?  9. NO - Do you or does anyone in your family have a serious bleeding problem such as prolonged bleeding following surgeries or cuts?  10. NO - Have you ever had problems with anemia or been told to take iron pills?  11. NO - Have you had any abnormal blood loss such as black, tarry or bloody stools, or abnormal vaginal bleeding?  12. NO - Have you ever had a blood transfusion?  13. NO - Have you or any of your relatives ever had problems with anesthesia?  14. NO - Do you have sleep apnea,  excessive snoring or daytime drowsiness?  15. NO - Do you have any prosthetic heart valves?  16. NO - Do you have prosthetic joints?  17. NO - Is there any chance that you may be pregnant?      HPI:     HPI related to upcoming procedure: painful right bunion          MEDICAL HISTORY:     Patient Active Problem List    Diagnosis Date Noted     ACP (advance care planning) 2016     Priority: Medium     Advance Care Planning 2016: ACP Review of Chart / Resources Provided:  Reviewed chart for advance care plan.  Luly Freeman has no plan or code status on file. Discussed available resources and provided with information. Confirmed code status reflects current choices pending further ACP discussions.  Confirmed/documented legally designated decision makers.  Added by Judith Richey             Multiple respiratory allergies 2013     Priority: Medium      Past Medical History:   Diagnosis Date     Allergic rhinitis 2011     Lump or mass in breast 2007     PONV (postoperative nausea and vomiting)      Recurrent sinusitis 2011     Varicose veins 2011     Past Surgical History:   Procedure Laterality Date     BUNIONECTOMY RT/LT  2008     COLONOSCOPY N/A 10/30/2017    Procedure: COLONOSCOPY;  WHOLE COLON COLONOSCOPY ;  Surgeon: Erma Jenkins MD;  Location: [facility]     ENT SURGERY  10/2013    nose surgery, Dr. Elliott     EXCISE MASS LOWER EXTREMITY Right 2017    Procedure: EXCISE MASS LOWER EXTREMITY;  EXCISION SOFT TISSUE MASS RIGHT LOWER LEG;  Surgeon: Erma Jenkins MD;  Location: [facility] OR            laparoscopic supracervical hysterectomy      Fibroids, ovaries intact     Left Breast Bx  3/2007    Fibrocystic breast disease     PE tube placement       ventilation tube, left       Current Outpatient Prescriptions   Medication Sig Dispense Refill     ALBUTEROL 108 (90 BASE) MCG/ACT inhaler INHALE 2 PUFFS BY MOUTH EVERY 6 HOURS AS NEEDED FOR SHORTNESS OF  BREATH (Patient not taking: Reported on 10/10/2018) 8.5 g 0     budesonide (PULMICORT) 0.5 MG/2ML neb solution Make 240 cc Aj med sinus irrigation Mix 2 ml vial of budesonide 0.5 mg Rinse BID for 2 weeks (Patient not taking: Reported on 8/29/2018) 2 Box 1     EPINEPHrine (AUVI-Q) 0.3 MG/0.3ML injection Inject 0.3 mLs into the muscle once as needed for anaphylaxis for 1 dose. 2 each 2     EPINEPHrine (EPIPEN/ADRENACLICK/OR ANY BX GENERIC EQUIV) 0.3 MG/0.3ML injection 2-pack Inject 0.3 mLs (0.3 mg) into the muscle as needed for anaphylaxis 0.6 mL 1     fexofenadine (ALLEGRA) 180 MG tablet Take 1 tablet by mouth every morning. (Patient not taking: Reported on 8/29/2018) 30 tablet 11     fluticasone (FLONASE) 50 MCG/ACT nasal spray Spray 2 sprays into both nostrils daily       order for DME Equipment being ordered: Please dispense a knee scooter for the patient to rent. She has foot surgery on 10/22/2018 and she will be picking up the knee scooter prior to the surgery. She will need the scooter for up to eight weeks from the date of the surgery. Thank you. 1 Device 0     SUBLINGUAL IMMUNOTHERAPY, SLIT, Complete current vial at TID dosing.  Next vial, decrease to BID x 1 vial.  Then one drop daily. (Patient not taking: Reported on 10/10/2018) 1 Bottle 3     triamcinolone (NASACORT AQ) 55 MCG/ACT Inhaler Spray 2 sprays into both nostrils daily (Patient not taking: Reported on 8/29/2018) 2 Bottle 3     OTC products: None, except as noted above    Allergies   Allergen Reactions     Seasonal Allergies       Latex Allergy: NO    Social History   Substance Use Topics     Smoking status: Never Smoker     Smokeless tobacco: Never Used      Comment: no passive exposure     Alcohol use Yes      Comment: rarely     History   Drug Use No       REVIEW OF SYSTEMS:   CONSTITUTIONAL: NEGATIVE for fever, chills, change in weight  INTEGUMENTARY/SKIN: NEGATIVE for worrisome rashes, moles or lesions  EYES: NEGATIVE for vision changes or  irritation  ENT/MOUTH: NEGATIVE for ear, mouth and throat problems  RESP: NEGATIVE for significant cough or SOB  BREAST: NEGATIVE for masses, tenderness or discharge  CV: NEGATIVE for chest pain, palpitations or peripheral edema  GI: NEGATIVE for nausea, abdominal pain, heartburn, or change in bowel habits  : NEGATIVE for frequency, dysuria, or hematuria  MUSCULOSKELETAL: NEGATIVE for significant arthralgias or myalgia  NEURO: NEGATIVE for weakness, dizziness or paresthesias  ENDOCRINE: NEGATIVE for temperature intolerance, skin/hair changes  HEME: NEGATIVE for bleeding problems  PSYCHIATRIC: NEGATIVE for changes in mood or affect    EXAM:   There were no vitals taken for this visit.    GENERAL APPEARANCE: healthy, alert and no distress     EYES: EOMI, PERRL     HENT: ear canals and TM's normal and nose and mouth without ulcers or lesions     NECK: no adenopathy, no asymmetry, masses, or scars and thyroid normal to palpation     RESP: lungs clear to auscultation - no rales, rhonchi or wheezes     CV: regular rates and rhythm, normal S1 S2, no S3 or S4 and no murmur, click or rub     ABDOMEN:  soft, nontender, no HSM or masses and bowel sounds normal     MS: extremities normal- no gross deformities noted, no evidence of inflammation in joints, FROM in all extremities. Right bunion     SKIN: no suspicious lesions or rashes     NEURO: Normal strength and tone, sensory exam grossly normal, mentation intact and speech normal     PSYCH: mentation appears normal. and affect normal/bright     LYMPHATICS: No cervical adenopathy    DIAGNOSTICS:     EKG: appears normal, NSR, normal axis, normal intervals, no acute ST/T changes c/w ischemia, no LVH by voltage criteria  Labs Resulted Today:   Results for orders placed or performed in visit on 08/29/18   XR FOOT RT G/E 3 VW (Clinic Performed)    Narrative    PROCEDURE: XR FOOT RT G/E 3 VW 8/29/2018 9:43 AM    HISTORY: WEIGHTBEARING please -- pre-op for RIGHT foot bunion  surgery;  Hallux valgus (acquired), right foot    COMPARISONS: None.    TECHNIQUE: 3 views.    FINDINGS: There is a mild hallux valgus deformity. There is no  significant degenerative change in the first metatarsal phalangeal  joint.    No fracture or dislocation is seen. There is no significant  degenerative change.         Impression    IMPRESSION: Mild hallux valgus deformity.    LISSETTE HAHN MD       Recent Labs   Lab Test  05/15/17   1147   HGB  12.9   PLT  225        IMPRESSION:   Reason for surgery/procedure: painful right bunion    The proposed surgical procedure is considered INTERMEDIATE risk.    REVISED CARDIAC RISK INDEX  The patient has the following serious cardiovascular risks for perioperative complications such as (MI, PE, VFib and 3  AV Block):  No serious cardiac risks  INTERPRETATION: 0 risks: Class I (very low risk - 0.4% complication rate)    The patient has the following additional risks for perioperative complications:  No identified additional risks      ICD-10-CM    1. Preop general physical exam Z01.818 EKG 12-lead complete w/read - (Clinic Performed)     CBC with platelets and differential     Comprehensive metabolic panel   2. Bunion, right M21.611        RECOMMENDATIONS:       Cardiovascular Risk  Performs 4 METs exercise without symptoms (Light housework (dusting, washing dishes), Climb a flight of stairs and Walk on level ground at 15 minutes per mile (4 miles/hour)) .       No medications to take     APPROVAL GIVEN to proceed with proposed procedure, without further diagnostic evaluation       Signed Electronically by: Tere Herrera MD    Copy of this evaluation report is provided to requesting physician.    [facility] Preop Guidelines    Revised Cardiac Risk Index

## 2018-10-19 ENCOUNTER — ANESTHESIA EVENT (OUTPATIENT)
Dept: SURGERY | Facility: HOSPITAL | Age: 53
End: 2018-10-19
Payer: COMMERCIAL

## 2018-10-22 ENCOUNTER — HOSPITAL ENCOUNTER (OUTPATIENT)
Facility: HOSPITAL | Age: 53
Discharge: HOME | End: 2018-10-22
Attending: PODIATRIST | Admitting: PODIATRIST
Payer: COMMERCIAL

## 2018-10-22 ENCOUNTER — ANESTHESIA (OUTPATIENT)
Dept: SURGERY | Facility: HOSPITAL | Age: 53
End: 2018-10-22
Payer: COMMERCIAL

## 2018-10-22 ENCOUNTER — APPOINTMENT (OUTPATIENT)
Dept: GENERAL RADIOLOGY | Facility: HOSPITAL | Age: 53
End: 2018-10-22
Attending: PODIATRIST
Payer: COMMERCIAL

## 2018-10-22 VITALS
SYSTOLIC BLOOD PRESSURE: 112 MMHG | HEART RATE: 73 BPM | OXYGEN SATURATION: 92 % | WEIGHT: 165 LBS | BODY MASS INDEX: 25.01 KG/M2 | HEIGHT: 68 IN | TEMPERATURE: 98 F | DIASTOLIC BLOOD PRESSURE: 75 MMHG | RESPIRATION RATE: 16 BRPM

## 2018-10-22 DIAGNOSIS — M20.11 HALLUX VALGUS (ACQUIRED), RIGHT FOOT: Primary | ICD-10-CM

## 2018-10-22 PROCEDURE — 25000128 H RX IP 250 OP 636: Performed by: NURSE ANESTHETIST, CERTIFIED REGISTERED

## 2018-10-22 PROCEDURE — 25000128 H RX IP 250 OP 636: Performed by: ANESTHESIOLOGY

## 2018-10-22 PROCEDURE — 37000008 ZZH ANESTHESIA TECHNICAL FEE, 1ST 30 MIN: Performed by: PODIATRIST

## 2018-10-22 PROCEDURE — 25000132 ZZH RX MED GY IP 250 OP 250 PS 637: Performed by: OBSTETRICS & GYNECOLOGY

## 2018-10-22 PROCEDURE — 40000306 ZZH STATISTIC PRE PROC ASSESS II: Performed by: PODIATRIST

## 2018-10-22 PROCEDURE — 27210794 ZZH OR GENERAL SUPPLY STERILE: Performed by: PODIATRIST

## 2018-10-22 PROCEDURE — 27211024 ZZHC OR SUPPLY OTHER OPNP: Performed by: PODIATRIST

## 2018-10-22 PROCEDURE — 25000131 ZZH RX MED GY IP 250 OP 636 PS 637: Performed by: ANESTHESIOLOGY

## 2018-10-22 PROCEDURE — 27110028 ZZH OR GENERAL SUPPLY NON-STERILE: Performed by: PODIATRIST

## 2018-10-22 PROCEDURE — 28296 COR HLX VLGS DSTL MTAR OSTEO: CPT | Mod: RT | Performed by: PODIATRIST

## 2018-10-22 PROCEDURE — 25000128 H RX IP 250 OP 636: Performed by: PODIATRIST

## 2018-10-22 PROCEDURE — C1769 GUIDE WIRE: HCPCS | Performed by: PODIATRIST

## 2018-10-22 PROCEDURE — 36000058 ZZH SURGERY LEVEL 3 EA 15 ADDTL MIN: Performed by: PODIATRIST

## 2018-10-22 PROCEDURE — 28750 FUSION OF BIG TOE JOINT: CPT | Performed by: ANESTHESIOLOGY

## 2018-10-22 PROCEDURE — 40000277 XR SURGERY CARM FLUORO LESS THAN 5 MIN W STILLS: Mod: TC

## 2018-10-22 PROCEDURE — 01999 UNLISTED ANES PROCEDURE: CPT | Performed by: NURSE ANESTHETIST, CERTIFIED REGISTERED

## 2018-10-22 PROCEDURE — 36000060 ZZH SURGERY LEVEL 3 W FLUORO 1ST 30 MIN: Performed by: PODIATRIST

## 2018-10-22 PROCEDURE — 25000125 ZZHC RX 250: Performed by: NURSE ANESTHETIST, CERTIFIED REGISTERED

## 2018-10-22 PROCEDURE — 25000125 ZZHC RX 250: Performed by: PODIATRIST

## 2018-10-22 PROCEDURE — 71000027 ZZH RECOVERY PHASE 2 EACH 15 MINS: Performed by: PODIATRIST

## 2018-10-22 PROCEDURE — C1713 ANCHOR/SCREW BN/BN,TIS/BN: HCPCS | Performed by: PODIATRIST

## 2018-10-22 PROCEDURE — 37000009 ZZH ANESTHESIA TECHNICAL FEE, EACH ADDTL 15 MIN: Performed by: PODIATRIST

## 2018-10-22 DEVICE — IMPLANTABLE DEVICE: Type: IMPLANTABLE DEVICE | Site: FOOT | Status: FUNCTIONAL

## 2018-10-22 RX ORDER — ONDANSETRON 4 MG/1
4 TABLET, ORALLY DISINTEGRATING ORAL EVERY 30 MIN PRN
Status: COMPLETED | OUTPATIENT
Start: 2018-10-22 | End: 2018-10-22

## 2018-10-22 RX ORDER — ONDANSETRON 2 MG/ML
4 INJECTION INTRAMUSCULAR; INTRAVENOUS EVERY 30 MIN PRN
Status: COMPLETED | OUTPATIENT
Start: 2018-10-22 | End: 2018-10-22

## 2018-10-22 RX ORDER — PROPOFOL 10 MG/ML
INJECTION, EMULSION INTRAVENOUS PRN
Status: DISCONTINUED | OUTPATIENT
Start: 2018-10-22 | End: 2018-10-22

## 2018-10-22 RX ORDER — CEFAZOLIN SODIUM 2 G/100ML
2 INJECTION, SOLUTION INTRAVENOUS
Status: COMPLETED | OUTPATIENT
Start: 2018-10-22 | End: 2018-10-22

## 2018-10-22 RX ORDER — CEFAZOLIN SODIUM 1 G/3ML
1 INJECTION, POWDER, FOR SOLUTION INTRAMUSCULAR; INTRAVENOUS SEE ADMIN INSTRUCTIONS
Status: DISCONTINUED | OUTPATIENT
Start: 2018-10-22 | End: 2018-10-22 | Stop reason: HOSPADM

## 2018-10-22 RX ORDER — FENTANYL CITRATE 50 UG/ML
25-50 INJECTION, SOLUTION INTRAMUSCULAR; INTRAVENOUS
Status: DISCONTINUED | OUTPATIENT
Start: 2018-10-22 | End: 2018-10-22 | Stop reason: HOSPADM

## 2018-10-22 RX ORDER — ACETAMINOPHEN 650 MG/1
650 SUPPOSITORY RECTAL EVERY 4 HOURS PRN
Status: DISCONTINUED | OUTPATIENT
Start: 2018-10-22 | End: 2018-10-22 | Stop reason: HOSPADM

## 2018-10-22 RX ORDER — ONDANSETRON 2 MG/ML
INJECTION INTRAMUSCULAR; INTRAVENOUS PRN
Status: DISCONTINUED | OUTPATIENT
Start: 2018-10-22 | End: 2018-10-22

## 2018-10-22 RX ORDER — PROPOFOL 10 MG/ML
INJECTION, EMULSION INTRAVENOUS CONTINUOUS PRN
Status: DISCONTINUED | OUTPATIENT
Start: 2018-10-22 | End: 2018-10-22

## 2018-10-22 RX ORDER — SODIUM CHLORIDE, SODIUM LACTATE, POTASSIUM CHLORIDE, CALCIUM CHLORIDE 600; 310; 30; 20 MG/100ML; MG/100ML; MG/100ML; MG/100ML
INJECTION, SOLUTION INTRAVENOUS CONTINUOUS
Status: DISCONTINUED | OUTPATIENT
Start: 2018-10-22 | End: 2018-10-22 | Stop reason: HOSPADM

## 2018-10-22 RX ORDER — NALOXONE HYDROCHLORIDE 0.4 MG/ML
.1-.4 INJECTION, SOLUTION INTRAMUSCULAR; INTRAVENOUS; SUBCUTANEOUS
Status: DISCONTINUED | OUTPATIENT
Start: 2018-10-22 | End: 2018-10-22 | Stop reason: HOSPADM

## 2018-10-22 RX ORDER — OXYCODONE AND ACETAMINOPHEN 5; 325 MG/1; MG/1
1-2 TABLET ORAL EVERY 6 HOURS PRN
Qty: 30 TABLET | Refills: 0 | Status: SHIPPED | OUTPATIENT
Start: 2018-10-22 | End: 2018-12-05

## 2018-10-22 RX ORDER — FENTANYL CITRATE 50 UG/ML
INJECTION, SOLUTION INTRAMUSCULAR; INTRAVENOUS PRN
Status: DISCONTINUED | OUTPATIENT
Start: 2018-10-22 | End: 2018-10-22

## 2018-10-22 RX ORDER — MEPERIDINE HYDROCHLORIDE 50 MG/ML
12.5 INJECTION INTRAMUSCULAR; INTRAVENOUS; SUBCUTANEOUS
Status: DISCONTINUED | OUTPATIENT
Start: 2018-10-22 | End: 2018-10-22 | Stop reason: HOSPADM

## 2018-10-22 RX ORDER — ACETAMINOPHEN 325 MG/1
650 TABLET ORAL EVERY 4 HOURS PRN
Status: DISCONTINUED | OUTPATIENT
Start: 2018-10-22 | End: 2018-10-22 | Stop reason: HOSPADM

## 2018-10-22 RX ORDER — DEXAMETHASONE SODIUM PHOSPHATE 10 MG/ML
INJECTION, SOLUTION INTRAMUSCULAR; INTRAVENOUS PRN
Status: DISCONTINUED | OUTPATIENT
Start: 2018-10-22 | End: 2018-10-22

## 2018-10-22 RX ADMIN — ONDANSETRON 4 MG: 2 INJECTION INTRAMUSCULAR; INTRAVENOUS at 09:10

## 2018-10-22 RX ADMIN — ONDANSETRON 4 MG: 4 TABLET, ORALLY DISINTEGRATING ORAL at 11:04

## 2018-10-22 RX ADMIN — ONDANSETRON 4 MG: 2 INJECTION, SOLUTION INTRAMUSCULAR; INTRAVENOUS at 10:20

## 2018-10-22 RX ADMIN — DEXAMETHASONE SODIUM PHOSPHATE 10 MG: 10 INJECTION, SOLUTION INTRAMUSCULAR; INTRAVENOUS at 09:10

## 2018-10-22 RX ADMIN — PROPOFOL 75 MCG/KG/MIN: 10 INJECTION, EMULSION INTRAVENOUS at 08:10

## 2018-10-22 RX ADMIN — ACETAMINOPHEN 650 MG: 325 TABLET, FILM COATED ORAL at 11:03

## 2018-10-22 RX ADMIN — MIDAZOLAM 2 MG: 1 INJECTION INTRAMUSCULAR; INTRAVENOUS at 08:01

## 2018-10-22 RX ADMIN — SODIUM CHLORIDE, POTASSIUM CHLORIDE, SODIUM LACTATE AND CALCIUM CHLORIDE: 600; 310; 30; 20 INJECTION, SOLUTION INTRAVENOUS at 07:33

## 2018-10-22 RX ADMIN — PROPOFOL 20 MG: 10 INJECTION, EMULSION INTRAVENOUS at 08:10

## 2018-10-22 RX ADMIN — PROPOFOL 30 MG: 10 INJECTION, EMULSION INTRAVENOUS at 08:07

## 2018-10-22 RX ADMIN — FENTANYL CITRATE 100 MCG: 50 INJECTION, SOLUTION INTRAMUSCULAR; INTRAVENOUS at 08:01

## 2018-10-22 RX ADMIN — CEFAZOLIN SODIUM 2 G: 2 INJECTION, SOLUTION INTRAVENOUS at 08:01

## 2018-10-22 NOTE — ANESTHESIA POSTPROCEDURE EVALUATION
Patient: Luly Freeman    Procedure(s):  RIGHT FOOT 1ST METATARSAL DISTAL CHEVERON OSTEOTOMY AND LATERAL SOFT FISSUE RELEASE    Diagnosis:HALLUX VALGUS RIGHT FOOT, FOOT PAIN RIGHT  Diagnosis Additional Information: No value filed.    Anesthesia Type:  MAC    Note:  Anesthesia Post Evaluation    Patient location during evaluation: PACU  Patient participation: Able to fully participate in evaluation  Level of consciousness: awake and alert  Pain management: adequate  Airway patency: patent  Cardiovascular status: acceptable  Respiratory status: acceptable  Hydration status: acceptable  PONV: none             Last vitals:  Vitals:    10/22/18 1110 10/22/18 1115 10/22/18 1120   BP: 115/78 112/75    Pulse:      Resp: 16 16    Temp:      SpO2: 97% 99% 92%         Electronically Signed By: Caleb Champagne MD  October 22, 2018  11:58 AM

## 2018-10-22 NOTE — ANESTHESIA CARE TRANSFER NOTE
Patient: Luly Freeman    Procedure(s):  RIGHT FOOT 1ST METATARSAL DISTAL CHEVERON OSTEOTOMY AND LATERAL SOFT FISSUE RELEASE    Diagnosis: HALLUX VALGUS RIGHT FOOT, FOOT PAIN RIGHT  Diagnosis Additional Information: No value filed.    Anesthesia Type:   MAC     Note:  Airway :Nasal Cannula  Patient transferred to:Phase II  Handoff Report: Identifed the Patient, Identified the Reponsible Provider, Reviewed the pertinent medical history, Discussed the surgical course, Reviewed Intra-OP anesthesia mangement and issues during anesthesia, Set expectations for post-procedure period and Allowed opportunity for questions and acknowledgement of understanding      Vitals: (Last set prior to Anesthesia Care Transfer)    CRNA VITALS  10/22/2018 0907 - 10/22/2018 0944      10/22/2018             Resp Rate (set): 8                Electronically Signed By: JORDYN Calvin CRNA  October 22, 2018  9:44 AM

## 2018-10-22 NOTE — IP AVS SNAPSHOT
MRN:[id number]                      After Visit Summary   10/22/2018    Luly Freeman    MRN: [id number]           Thank you!     Thank you for choosing [facility] for your care. Our goal is always to provide you with excellent care. Hearing back from our patients is one way we can continue to improve our services. Please take a few minutes to complete the written survey that you may receive in the mail after you visit with us. Thank you!        Patient Information     Date Of Birth          1965        About your hospital stay     You were admitted on:  October 22, 2018 You last received care in the:  [facility] Preop/Phase II    You were discharged on:  October 22, 2018        Reason for your hospital stay       You were here today for correction of your RIGHT foot bunion                  Who to Call     For medical emergencies, please call 911.  For non-urgent questions about your medical care, please call your primary care provider or clinic, [phone]  For questions related to your surgery, please call your surgery clinic        Attending Provider     Provider Specialty    Netta Soni DPM Podiatry       Primary Care Provider Office Phone # Fax #    Tere Herrera [phone]      After Care Instructions     Activity       Remain non-weightbearing wearing your CAM boot and using your crutches at all times.            Wound care and dressings       Leaving dressing clean, dry and intact.                  Follow-up Appointments     Follow-up and recommended labs and tests        Follow up with Netta hardwick, on Thursday 10/25/2018 for your first post-op visit.                  Your next 10 appointments already scheduled     Oct 25, 2018 10:30 AM CDT   (Arrive by 10:15 AM)   Return Visit with Netta Soni DPM   [facility] ([facility] )    [address] [place] [id number] [phone]             "  Further instructions from your care team           Post-Anesthesia Patient Instructions    IMMEDIATELY FOLLOWING SURGERY:  Do not drive or operate machinery for the first twenty four hours after surgery.  Do not make any important decisions for twenty four hours after surgery or while taking narcotic pain medications or sedatives.  If you develop intractable nausea and vomiting or a severe headache please notify your doctor immediately.    FOLLOW-UP:  Please make an appointment with your surgeon as instructed. You do not need to follow up with anesthesia unless specifically instructed to do so.    WOUND CARE INSTRUCTIONS (if applicable):  Keep a dry clean dressing on the anesthesia/puncture wound site if there is drainage.  Once the wound has quit draining you may leave it open to air.  Generally you should leave the bandage intact for twenty four hours unless there is drainage.  If the epidural site drains for more than 36-48 hours please call the anesthesia department.    QUESTIONS?:  Please feel free to call your physician or the hospital  if you have any questions, and they will be happy to assist you.       Pending Results     No orders found from 10/20/2018 to 10/23/2018.            Statement of Approval     Ordered          10/22/18 1004  I have reviewed and agree with all the recommendations and orders detailed in this document.  EFFECTIVE NOW     Approved and electronically signed by:  Netta Soni DPM             Admission Information     Date & Time Provider Department Dept. Phone    10/22/2018 Netta Soni DPM HI Preop/Phase [phone]      Your Vitals Were     Blood Pressure Pulse Temperature Respirations Height Weight    105/72 73 98  F (36.7  C) (Oral) 16 1.727 m (5' 8\") 74.8 kg (165 lb)    Pulse Oximetry BMI (Body Mass Index)                95% 25.09 kg/m2          MyChart Information     [facility] gives you secure access to your electronic health record. If you see a " primary care provider, you can also send messages to your care team and make appointments. If you have questions, please call your primary care clinic.  If you do not have a primary care provider, please call [phone] and they will assist you.        Care EveryWhere ID     This is your Care EveryWhere ID. This could be used by other organizations to access your [place] [facility] medical records  [id number]        Equal Access to Services     ORION ALATORRE : Hadii dmitri paredes hadasho Soskipali, waaxda luqadaha, qaybta kaalmada adeegyada, waxay nadine mimsrazaarsh thomas. So [facility] [phone].    ATENCIÓN: Si yvesla angelkacey, tiene a morocho disposición servicios gratuitos de asistencia lingüística. Roverto al [phone].    We comply with applicable federal civil rights laws and [place] laws. We do not discriminate on the basis of race, color, national origin, age, disability, sex, sexual orientation, or gender identity.               Review of your medicines      START taking        Dose / Directions    oxyCODONE-acetaminophen 5-325 MG per tablet   Commonly known as:  PERCOCET   Used for:  Hallux valgus (acquired), right foot        Dose:  1-2 tablet   Take 1-2 tablets by mouth every 6 hours as needed for pain (Post-op pain)   Quantity:  30 tablet   Refills:  0         CONTINUE these medicines which have NOT CHANGED        Dose / Directions    fexofenadine 180 MG tablet   Commonly known as:  ALLEGRA        Dose:  180 mg   Take 1 tablet by mouth every morning.   Quantity:  30 tablet   Refills:  11       fluticasone 50 MCG/ACT spray   Commonly known as:  FLONASE        Dose:  2 spray   Spray 2 sprays into both nostrils daily   Refills:  0       order for DME   Used for:  Hallux valgus (acquired), right foot, Foot pain, right        Equipment being ordered: Please dispense a knee scooter for the patient to rent. She has foot surgery on 10/22/2018 and she will be picking up the knee scooter prior to the surgery. She will need  the scooter for up to eight weeks from the date of the surgery. Thank you.   Quantity:  1 Device   Refills:  0       PROAIR  (90 Base) MCG/ACT inhaler   Used for:  Acute bronchitis   Generic drug:  albuterol        INHALE 2 PUFFS BY MOUTH EVERY 6 HOURS AS NEEDED FOR SHORTNESS OF BREATH   Quantity:  8.5 g   Refills:  0       triamcinolone 55 MCG/ACT inhaler   Commonly known as:  NASACORT AQ   Used for:  Perennial allergic rhinitis, Seasonal allergic rhinitis, unspecified chronicity, unspecified trigger, History of myringotomy, Dysfunction of left eustachian tube        Dose:  2 spray   Spray 2 sprays into both nostrils daily   Quantity:  2 Bottle   Refills:  3            Where to get your medicines      Some of these will need a paper prescription and others can be bought over the counter. Ask your nurse if you have questions.     Bring a paper prescription for each of these medications     oxyCODONE-acetaminophen 5-325 MG per tablet                Protect others around you: Learn how to safely use, store and throw away your medicines at www.disposemymeds.org.        Information about OPIOIDS     PRESCRIPTION OPIOIDS: WHAT YOU NEED TO KNOW   We gave you an opioid (narcotic) pain medicine. It is important to manage your pain, but opioids are not always the best choice. You should first try all the other options your care team gave you. Take this medicine for as short a time (and as few doses) as possible.    Some activities can increase your pain, such as bandage changes or therapy sessions. It may help to take your pain medicine 30 to 60 minutes before these activities. Reduce your stress by getting enough sleep, working on hobbies you enjoy and practicing relaxation or meditation. Talk to your care team about ways to manage your pain beyond prescription opioids.    These medicines have risks:    DO NOT drive when on new or higher doses of pain medicine. These medicines can affect your alertness and reaction  times, and you could be arrested for driving under the influence (DUI). If you need to use opioids long-term, talk to your care team about driving.    DO NOT operate heavy machinery    DO NOT do any other dangerous activities while taking these medicines.    DO NOT drink any alcohol while taking these medicines.     If the opioid prescribed includes acetaminophen, DO NOT take with any other medicines that contain acetaminophen. Read all labels carefully. Look for the word  acetaminophen  or  Tylenol.  Ask your pharmacist if you have questions or are unsure.    You can get addicted to pain medicines, especially if you have a history of addiction (chemical, alcohol or substance dependence). Talk to your care team about ways to reduce this risk.    All opioids tend to cause constipation. Drink plenty of water and eat foods that have a lot of fiber, such as fruits, vegetables, prune juice, apple juice and high-fiber cereal. Take a laxative (Miralax, milk of magnesia, Colace, Senna) if you don t move your bowels at least every other day. Other side effects include upset stomach, sleepiness, dizziness, throwing up, tolerance (needing more of the medicine to have the same effect), physical dependence and slowed breathing.    Store your pills in a secure place, locked if possible. We will not replace any lost or stolen medicine. If you don t finish your medicine, please throw away (dispose) as directed by your pharmacist. The [place] [employer]ution [employer] has more information about safe disposal: https://www.pca.[facility].[place].us/living-green/managing-unwanted-medications             Medication List: This is a list of all your medications and when to take them. Check marks below indicate your daily home schedule. Keep this list as a reference.      Medications           Morning Afternoon Evening Bedtime As Needed    fexofenadine 180 MG tablet   Commonly known as:  ALLEGRA   Take 1 tablet by mouth every morning.                                 fluticasone 50 MCG/ACT spray   Commonly known as:  FLONASE   Spray 2 sprays into both nostrils daily                                order for DME   Equipment being ordered: Please dispense a knee scooter for the patient to rent. She has foot surgery on 10/22/2018 and she will be picking up the knee scooter prior to the surgery. She will need the scooter for up to eight weeks from the date of the surgery. Thank you.                                oxyCODONE-acetaminophen 5-325 MG per tablet   Commonly known as:  PERCOCET   Take 1-2 tablets by mouth every 6 hours as needed for pain (Post-op pain)                                PROAIR  (90 Base) MCG/ACT inhaler   INHALE 2 PUFFS BY MOUTH EVERY 6 HOURS AS NEEDED FOR SHORTNESS OF BREATH   Generic drug:  albuterol                                triamcinolone 55 MCG/ACT inhaler   Commonly known as:  NASACORT AQ   [place] 2 sprays into both nostrils daily                                          More Information        Foot Surgery: Bunions  A bunion is a bony bump. When the distance between the first and second metatarsal bones of the foot is greater than normal, the big toe may turn toward the other toes. A mild bunion may then form causing foot pain and swelling. Bunions are most often found near the joint at the base of the big toe. Bunions tend to run in families. They may cause pain, swelling, and skin irritation. Wearing tight shoes can cause bunions, and can make them worse. Bunions vary from mild to severe and can be treated in many ways. Most bunions can be managed by proper shoe selection and other measures, and most do not need surgery. If pain remains severe and surgery is needed, the surgical techniques below may be a choice.       Head chevron osteotomy  The first metatarsal bone is cut. Its head is moved closer to the second metatarsal bone. A screw or pin can be used to hold the first metatarsal bone in position. The bony bump is also  removed. To protect your foot, you will need to wear a surgical shoe for a few weeks.      Base osteotomy  With this procedure, a wedge of bone is removed from the first metatarsal bone, farther back than in the head chevron osteotomy. The bone is moved closer to the second metatarsal bone and held together with screws. The bony bump is also removed. To heal right, your foot may be placed in a cast. You may be asked not to bear weight on this foot for several weeks.  Soft tissue repair  This procedure tightens the loose ligaments and tendons and loosens the tight ones surrounding the bunion. It can be combined with an osteotomy procedure.  Fusion or removal of part of the joint  This is typically only done if there is severe arthritis or damage of the joint itself.  Date Last Reviewed: 10/15/2015    American [employer] Association 2007. All rights reserved.

## 2018-10-22 NOTE — OR NURSING
Pateint discharged to home.  Go score 19. Pain level 0/10.  Discharged from unit via wheelcahir.

## 2018-10-22 NOTE — OR NURSING
Pt arrived to [facility] with right foot elevated on pillow, boot in place.

## 2018-10-22 NOTE — OP NOTE
[facility] Brief Operative Note    Pre-operative diagnosis: HALLUX VALGUS RIGHT FOOT, FOOT PAIN RIGHT   Post-operative diagnosis Right foot hallux valgus   Procedure: Procedure(s):  RIGHT FOOT 1ST METATARSAL DISTAL CHEVERON OSTEOTOMY AND LATERAL SOFT FISSUE RELEASE   Surgeon: Netta Soni DPM   Assistants(s): None   Estimated blood loss: Minimal    Specimens: None   Findings: Right foot hallux valgus       PROCEDURE IN DETAIL:  Under mild sedation, the patient was brought into the OR and placed on the operating room table in a supine position.  A pneumatic ankle tourniquet was placed about the patient's right ankle.  Following IV sedation, local anesthesia was obtained about the entire mid foot utilizing a Lloyd block technique with 35 mL, 1:1 ration of 0.5% Marcaine plain and 1% lidocaine plain.  The foot was then scrubbed, prepped and draped in the usual aseptic manner.  An Esmarch bandage was utilized to exsanguinate the patient's right foot and the tourniquet was inflated to 250 mmHg.      Attention was directed to the RIGHT dorsal medial aspect of the first metatarsal phalangeal joint of the right foot where a 5 cm linear, longitudinal incision was made medial and parallel to the tendon of the extensor hallucis longus and involved the contour of the deformity.  The incision was deepened through the subcutaneous tissues using sharp and blunt dissection.  Care was taken to identify and retract all vital neural and vascular structures.  All bleeders were ligated and cauterized as necessary.  At this time, a linear capsulotomy was performed over the dorsal medial aspect of the first metatarsal phalangeal joint.  The periosteal and capsular structures were then carefully dissected free of their osseous attachments and reflected medially and laterally, thus exposing the head of the first metatarsal at the operative site.    Next, utilizing an oscillating bone saw, the dorsal medial prominence was resected  and passed from the operative field.  Attention was then directed to the first interspace via the original skin incision where the tendon of the extensor hallucis brevis was identified.  The dissection was continued deep where the conjoined tendon of the adductor hallucis muscle was identified and transected at its attachment to the base of the proximal phalanx.  At this time, the lateral contracture present on the hallux was noted to be reduced and the sesamoid apparatus was noted to float in a more corrected medial position.    Utilizing an oscillating bone saw, a through-and-through chevron osteotomy was created in the metaphyseal region of the bone.  The apex of this osteotomy pointed distally with the arms pointing proximal plantar and proximal dorsal with the dorsal arm having a superiorly angled segment as to not create an excessively long and thin dorsal arm.   The dorsal arm was made longer to accommodate internal fixation.  Upon completion of the osteotomy, the capital fragment was distracted and shifted laterally into a more corrected position and impacted upon the first metatarsal shaft.  A 0.045 inch K-wire was driven from dorsal to plantar on the medial aspect of the bone across the osteotomy site to serve as a temporary fixation. Another 0.045 inch k-wire was drilled lateral to the first K-wire and removed. A 1.7 underdrill was drilled through all four cortices on the lateral portion of the bone. A countersink was used followed by a 2.4 overdrill. An 18mm x 2.4 screw was placed in the lateral aspect of the bone, but the bone did not compress and the screw was spinning in the bone. The screw was temporarily left in place while attention was directed to the medial point of fixation. The 0.045 inch k-wire was removed. A 1.7 underdrill and a 2.4 overdrill were used without a countersink. An 18mm x 2.4 screw were placed with standard AO principals and there was adequate compression of the bone with  excellent purchase of the far cortex. Two-finger tightness was used to secure the screw. At this time, the 18mm x 2.4 screw was removed from the lateral aspect of the bone and was replaced with an 18mm x 3.0 screw. This screw provided compression of the bone with excellent purchase of the far cortex and the screw was tightened to two-finger tightness. Next a medial capsulorrhaphy was performed. Correction of the deformity was assessed at this time and noted to be excellent.      The wound was then flushed with copious amounts of normal sterile saline.  The periosteal and capsular structures were reapproximated and coapted using 3-0 Vicryl. The subcuticular tissues were then reapproximated using 4-0 Vicryl and the skin was reapproximated using 4-0 Prolene using a vertical mattress suture technique.  The operative site was then dressed with Xeroform gauze and a dry sterile dressing consisting of 4 x 4s, cast padding, Kerlix, and Coban.  The pneumatic ankle tourniquet was then deflated after a total of 74 minutes and a prompt hyperemic response was noted to all digits of the right foot.  A CAM boot was applied to the foot.    The patient tolerated the procedure and anesthesia well.  She was transferred to the recovery room with vital signs stable and vascular status intact to all toes of the RIGHT foot.  Following a period of postoperative monitoring, the patient was discharged home with written and oral postoperative instructions. She has been instructed to remained 100% non-weightbearing with her CAM boot and using crutches. She has Percocet (5/325mg) for pain. She is to call the podiatry clinic with any concerns. She is to leave her sterile dressing clean, dry and intact. She will present for her first post-operative appointment with podiatry on Thursday, 10/25/2018.

## 2018-10-22 NOTE — IP AVS SNAPSHOT
HI Preop/Phase II    [address] [address]    Phone:  [phone]                                       After Visit Summary   10/22/2018    Luly Freeman    MRN: [id number]           After Visit Summary Signature Page     I have received my discharge instructions, and my questions have been answered. I have discussed any challenges I see with this plan with the nurse or doctor.    ..........................................................................................................................................  Patient/Patient Representative Signature      ..........................................................................................................................................  Patient Representative Print Name and Relationship to Patient    ..................................................               ................................................  Date                                   Time    ..........................................................................................................................................  Reviewed by Signature/Title    ...................................................              ..............................................  Date                                               Time          22EPIC Rev 08/18

## 2018-10-25 ENCOUNTER — HOSPITAL ENCOUNTER (OUTPATIENT)
Dept: GENERAL RADIOLOGY | Facility: HOSPITAL | Age: 53
Discharge: HOME | End: 2018-10-25
Attending: PODIATRIST | Admitting: PODIATRIST
Payer: COMMERCIAL

## 2018-10-25 ENCOUNTER — OFFICE VISIT (OUTPATIENT)
Dept: PODIATRY | Facility: OTHER | Age: 53
End: 2018-10-25
Attending: PODIATRIST
Payer: COMMERCIAL

## 2018-10-25 VITALS — SYSTOLIC BLOOD PRESSURE: 121 MMHG | TEMPERATURE: 97.5 F | HEART RATE: 79 BPM | DIASTOLIC BLOOD PRESSURE: 74 MMHG

## 2018-10-25 DIAGNOSIS — M20.11 HALLUX VALGUS (ACQUIRED), RIGHT FOOT: Primary | ICD-10-CM

## 2018-10-25 DIAGNOSIS — M20.11 HALLUX VALGUS (ACQUIRED), RIGHT FOOT: ICD-10-CM

## 2018-10-25 PROCEDURE — 73630 X-RAY EXAM OF FOOT: CPT | Mod: TC,RT

## 2018-10-25 PROCEDURE — 99024 POSTOP FOLLOW-UP VISIT: CPT | Performed by: PODIATRIST

## 2018-10-25 ASSESSMENT — PAIN SCALES - GENERAL: PAINLEVEL: MILD PAIN (2)

## 2018-10-25 NOTE — PROGRESS NOTES
Chief complaint: Patient presents with:  Surgical Followup: right foot      History of Present Illness: This 53 year old female is seen today for her first post-op visit following RIGHT foot distal chevron osteotomy of the 1st metatarsal (DOS 10/22/2018). Patient said her toe was numb for up to 24 hours after the procedure. She took very little Percocet because it makes her feel sick so she has been managing her pain well with Tylenol. She has had little pain. She sleeps with her CAM boot on and never takes it off. She had some twitches in her feet while sleeping which causing a slight zing of pain in her toes--she is wondering if this causes any damage to the surgical site. She otherwise has no complaints and she does not need more pain medication. She has kept her dressing clean, dry and intact. No further pedal complaints today.      /74 (BP Location: Right arm, Patient Position: Sitting, Cuff Size: Adult Regular)  Pulse 79  Temp 97.5  F (36.4  C) (Tympanic)    Patient Active Problem List   Diagnosis     Multiple respiratory allergies     ACP (advance care planning)       Past Surgical History:   Procedure Laterality Date     BUNIONECTOMY Right 10/22/2018    Procedure: RIGHT FOOT 1ST METATARSAL DISTAL CHEVERON OSTEOTOMY AND LATERAL SOFT FISSUE RELEASE;  Surgeon: Netta Soni DPM;  Location: [facility]     BUNIONECTOMY RT/LT  2008     COLONOSCOPY N/A 10/30/2017    Procedure: COLONOSCOPY;  WHOLE COLON COLONOSCOPY ;  Surgeon: Erma Jenkins MD;  Location: [facility]     ENT SURGERY  10/2013    nose surgery, Dr. Elliott     EXCISE MASS LOWER EXTREMITY Right 2017    Procedure: EXCISE MASS LOWER EXTREMITY;  EXCISION SOFT TISSUE MASS RIGHT LOWER LEG;  Surgeon: Erma Jenkins MD;  Location: [facility]            laparoscopic supracervical hysterectomy      Fibroids, ovaries intact     Left Breast Bx  3/2007    Fibrocystic breast disease     PE tube placement       ventilation tube, left          Current Outpatient Prescriptions   Medication     ALBUTEROL 108 (90 BASE) MCG/ACT inhaler     fexofenadine (ALLEGRA) 180 MG tablet     fluticasone (FLONASE) 50 MCG/ACT nasal spray     order for DME     oxyCODONE-acetaminophen (PERCOCET) 5-325 MG per tablet     triamcinolone (NASACORT AQ) 55 MCG/ACT Inhaler     No current facility-administered medications for this visit.        Allergies   Allergen Reactions     Seasonal Allergies        Family History   Problem Relation Age of Onset     C.A.D. Father      Diabetes No family hx of      Cerebrovascular Disease No family hx of        Social History     Social History     Marital status:      Spouse name: N/A     Number of children: N/A     Years of education: N/A     Occupational History     cosmotologist      Full-time     Social History Main Topics     Smoking status: Never Smoker     Smokeless tobacco: Never Used      Comment: no passive exposure     Alcohol use Yes      Comment: rarely     Drug use: No     Sexual activity: Not Asked     Other Topics Concern     Caffeine Concern Yes     coffee, 1 cup daily     Parent/Sibling W/ Cabg, Mi Or Angioplasty Before 65f 55m? No     Social History Narrative          ROS: 10 point ROS neg other than the symptoms noted above in the HPI.  EXAM  Constitutional: healthy, alert and no distress      Psychiatric: mentation appears normal and affect normal/bright     RIGHT FOOT FOCUSED:    VASCULAR:  -Dorsalis pedis pulse +2/4  -Posterior tibial pulse +2/4  -Telangiectasias and varicosities present to ankle and posterior leg  NEURO:  -Light touch sensation intact to b/l plantar forefoot  DERM:  -Incision to RIGHT dorsal 1st ray with skin edges well approximated and no signs of dehiscence. Minimal erythema, edema and calor near incision site--par with post-operative course. No dehiscence, no purulence, no moderate erythema, no ascending erythema, no malodor, no pain, no SOI  -Skin temperature, texture and turgor WNL  b/l  -Toenails normotrophic x 5  MSK:  -No excessive pain around incision site     RADIOGRAPHS RIGHT FOOT 08/29/2018     FINDINGS: There is a mild hallux valgus deformity. There is no  significant degenerative change in the first metatarsal phalangeal  joint.      No fracture or dislocation is seen. There is no significant  degenerative change.          IMPRESSION: Mild hallux valgus deformity.      LISSETTE HAHN MD     RADIOGRAPHS LEFT FOOT 08/29/2018  FINDINGS: There has been previous bunion surgery with postsurgical  changes in the first metatarsal. There is no significant hallux valgus  deformity. There is mild degenerative change of the first metatarsal  phalangeal joint.      No fracture, dislocation or other significant degenerative change is  seen.          IMPRESSION: Postsurgical changes in the first metatarsal.      LISSETTE HAHN MD  ============================================================      ASSESSMENT:  (M20.11) Hallux valgus (acquired), right foot  (primary encounter diagnosis)      (M79.671) Foot pain, right      (I83.93) Varicose veins of both lower extremities        PLAN:  -Patient evaluated and examined. Treatment options discussed with no educational barriers noted.  -Reviewed intra-op radiographs with patient  -Ordered radiographs NWB RIGHT foot to be obtained today  -Ordered radiographs NWB for patient to get in [facility] prior to next appointment  -Dressed wound with Xeroform, gauze, Kerlix and coban.  -Patient is to keep the dressing clean, dry and intact until her f/u appointment. Dispensed the extra Xeroform and gauze to patient in case dressing gets wet--she may reapply a similar dressing to her foot. If she has concerns with the dressing, she is to see podiatry sooner for evaluation and a new dressing change.  -Patient to remain NWB in a CAM boot. She will start Tuesday/Thursday 8-hour shifts at her work ( in [place], [place]) next week, but she will wear her CAM  boot and use her knee scooter as much as possible. She is to continue using her knee scooter when possible.  -Continue knee scooter  -All of patients questions were answered and she was in agreement with the above treatment plan    RTC for suture removal on Wednesday, 11/07/2018 in [place] (patient works all day in  on Tues/Thur)  --X-rays prior to appointment (already ordered)        Netta Soni DPM

## 2018-10-25 NOTE — MR AVS SNAPSHOT
After Visit Summary   10/25/2018    Luly Freeman    MRN: [id number]           Patient Information     Date Of Birth          1965        Visit Information        Provider Department      10/25/2018 10:30 AM Netta Soni DPM [facility]        Today's Diagnoses     Hallux valgus (acquired), right foot    -  1       Follow-ups after your visit        Your next 10 appointments already scheduled     Nov 07, 2018 11:00 AM CST   (Arrive by 10:45 AM)   Return Visit with Netta Soni DPM   [facility] [place] ([facility] )    [address] [id number] [phone]              Future tests that were ordered for you today     Open Future Orders        Priority Expected Expires Ordered    XR FOOT RT G/E 3 VW (Clinic Performed) Routine 11/7/2018 10/25/2019 10/25/2018    XR Foot Right G/E 3 Views Routine 10/25/2018 10/25/2019 10/25/2018            Who to contact     If you have questions or need follow up information about today's clinic visit or your schedule please contact [facility] directly at [phone].  Normal or non-critical lab and imaging results will be communicated to you by Offi[employer]chart, letter or phone within 4 business days after the clinic has received the results. If you do not hear from us within 7 days, please contact the clinic through OffiSynchart or phone. If you have a critical or abnormal lab result, we will notify you by phone as soon as possible.  Submit refill requests through [employer] or call your pharmacy and they will forward the refill request to us. Please allow 3 business days for your refill to be completed.          Additional Information About Your Visit        OffiSynchart Information     [employer] gives you secure access to your electronic health record. If you see a primary care provider, you can also send messages to your care team and make appointments. If  you have questions, please call your primary care clinic.  If you do not have a primary care provider, please call [phone] and they will assist you.        Care EveryWhere ID     This is your Care EveryWhere ID. This could be used by other organizations to access your [facility] medical records  [id number]        Your Vitals Were     Pulse Temperature                79 97.5  F (36.4  C) (Tympanic)           Blood Pressure from Last 3 Encounters:   10/25/18 121/74   10/22/18 112/75   10/15/18 122/62    Weight from Last 3 Encounters:   10/22/18 165 lb (74.8 kg)   10/15/18 160 lb (72.6 kg)   02/09/18 160 lb (72.6 kg)               Primary Care Provider Office Phone # Fax #    Tere Herrera [phone] [address] [address]        Equal Access to Services     ORION ALATORRE : Hadii aad ku hadasho Soomaali, waaxda luqadaha, qaybta kaalmada adeegyada, waxay karstenin haynkechin kisha ng . So [facility] [phone].    ATENCIÓN: Si habla español, tiene a morocho disposición servicios gratuitos de asistencia lingüística. Roverto al [phone].    We comply with applicable federal civil rights laws and [place] laws. We do not discriminate on the basis of race, color, national origin, age, disability, sex, sexual orientation, or gender identity.            Thank you!     Thank you for choosing [facility]  for your care. Our goal is always to provide you with excellent care. Hearing back from our patients is one way we can continue to improve our services. Please take a few minutes to complete the written survey that you may receive in the mail after your visit with us. Thank you!             Your Updated Medication List - Protect others around you: Learn how to safely use, store and throw away your medicines at www.disposemymeds.org.          This list is accurate as of 10/25/18 10:56 AM.  Always use your most recent med list.                   Brand Name  Dispense Instructions for use Diagnosis    fexofenadine 180 MG tablet    ALLEGRA    30 tablet    Take 1 tablet by mouth every morning.        fluticasone 50 MCG/ACT spray    FLONASE     Spray 2 sprays into both nostrils daily        order for DME     1 Device    Equipment being ordered: Please dispense a knee scooter for the patient to rent. She has foot surgery on 10/22/2018 and she will be picking up the knee scooter prior to the surgery. She will need the scooter for up to eight weeks from the date of the surgery. Thank you.    Hallux valgus (acquired), right foot, Foot pain, right       oxyCODONE-acetaminophen 5-325 MG per tablet    PERCOCET    30 tablet    Take 1-2 tablets by mouth every 6 hours as needed for pain (Post-op pain)    Hallux valgus (acquired), right foot       PROAIR  (90 Base) MCG/ACT inhaler   Generic drug:  albuterol     8.5 g    INHALE 2 PUFFS BY MOUTH EVERY 6 HOURS AS NEEDED FOR SHORTNESS OF BREATH    Acute bronchitis       triamcinolone 55 MCG/ACT inhaler    NASACORT AQ    2 Bottle    Spray 2 sprays into both nostrils daily    Perennial allergic rhinitis, Seasonal allergic rhinitis, unspecified chronicity, unspecified trigger, History of myringotomy, Dysfunction of left eustachian tube

## 2018-10-25 NOTE — NURSING NOTE
"Chief Complaint   Patient presents with     Surgical Followup     right foot       Initial /74 (BP Location: Right arm, Patient Position: Sitting, Cuff Size: Adult Regular)  Pulse 79  Temp 97.5  F (36.4  C) (Tympanic) Estimated body mass index is 25.09 kg/(m^2) as calculated from the following:    Height as of 10/22/18: 5' 8\" (1.727 m).    Weight as of 10/22/18: 165 lb (74.8 kg).  Medication Reconciliation: complete    Marisel Ruiz LPN  "

## 2018-11-07 ENCOUNTER — OFFICE VISIT (OUTPATIENT)
Dept: PODIATRY | Facility: OTHER | Age: 53
End: 2018-11-07
Attending: PODIATRIST
Payer: COMMERCIAL

## 2018-11-07 ENCOUNTER — RADIANT APPOINTMENT (OUTPATIENT)
Dept: GENERAL RADIOLOGY | Facility: OTHER | Age: 53
End: 2018-11-07
Attending: PODIATRIST
Payer: COMMERCIAL

## 2018-11-07 VITALS
OXYGEN SATURATION: 100 % | TEMPERATURE: 98.6 F | HEART RATE: 71 BPM | SYSTOLIC BLOOD PRESSURE: 102 MMHG | WEIGHT: 165 LBS | DIASTOLIC BLOOD PRESSURE: 68 MMHG | BODY MASS INDEX: 25.09 KG/M2

## 2018-11-07 DIAGNOSIS — M20.11 HALLUX VALGUS (ACQUIRED), RIGHT FOOT: Primary | ICD-10-CM

## 2018-11-07 DIAGNOSIS — I83.93 ASYMPTOMATIC VARICOSE VEINS OF BOTH LOWER EXTREMITIES: ICD-10-CM

## 2018-11-07 DIAGNOSIS — M20.11 HALLUX VALGUS (ACQUIRED), RIGHT FOOT: ICD-10-CM

## 2018-11-07 PROCEDURE — 99024 POSTOP FOLLOW-UP VISIT: CPT | Performed by: PODIATRIST

## 2018-11-07 PROCEDURE — 73630 X-RAY EXAM OF FOOT: CPT | Mod: TC

## 2018-11-07 ASSESSMENT — PAIN SCALES - GENERAL: PAINLEVEL: NO PAIN (0)

## 2018-11-07 NOTE — MR AVS SNAPSHOT
After Visit Summary   11/7/2018    Luly Freeman    MRN: [id number]           Patient Information     Date Of Birth          1965        Visit Information        Provider Department      11/7/2018 11:00 AM Netta Soni DPM [facility]        Today's Diagnoses     Hallux valgus (acquired), right foot    -  1    Asymptomatic varicose veins of both lower extremities           Follow-ups after your visit        Your next 10 appointments already scheduled     Nov 28, 2018  2:30 PM CST   (Arrive by 2:15 PM)   Post Op with Netta Soni DPM   [facility] [place] ([facility] )    [address] [place] [address]  [place] [address] [phone]              Future tests that were ordered for you today     Open Future Orders        Priority Expected Expires Ordered    XR FOOT RT G/E 3 VW (Clinic Performed) Routine 11/28/2018 11/7/2019 11/7/2018            Who to contact     If you have questions or need follow up information about today's clinic visit or your schedule please contact [facility] directly at [phone].  Normal or non-critical lab and imaging results will be communicated to you by [employer], letter or phone within 4 business days after the clinic has received the results. If you do not hear from us within 7 days, please contact the clinic through [employer] or phone. If you have a critical or abnormal lab result, we will notify you by phone as soon as possible.  Submit refill requests through [employer] or call your pharmacy and they will forward the refill request to us. Please allow 3 business days for your refill to be completed.          Additional Information About Your Visit        MyChart Information     [employer] gives you secure access to your electronic health record. If you see a primary care provider, you can also send messages to your care team and make appointments. If you have questions, please call  your primary care clinic.  If you do not have a primary care provider, please call [phone] and they will assist you.        Care EveryWhere ID     This is your Care EveryWhere ID. This could be used by other organizations to access your [facility] medical records  [id number]        Your Vitals Were     Pulse Temperature Pulse Oximetry BMI (Body Mass Index)          71 98.6  F (37  C) (Tympanic) 100% 25.09 kg/m2         Blood Pressure from Last 3 Encounters:   11/07/18 102/68   10/25/18 121/74   10/22/18 112/75    Weight from Last 3 Encounters:   11/07/18 165 lb (74.8 kg)   10/22/18 165 lb (74.8 kg)   10/15/18 160 lb (72.6 kg)              We Performed the Following     REMOVAL OF SUTURES        Primary Care Provider Office Phone # Fax #    Tere Herrera [phone]       [address] [place] [address]        Equal Access to Services     ORION ALATORRE AH: Hadii aad ku hadasho Soomaali, waaxda luqadaha, qaybta kaalmada adeegyada, waxay karstenin haydeepika ng . So [facility] [phone].    ATENCIÓN: Si habla español, tiene a morocho disposición servicios gratuitos de asistencia lingüística. [facility] [phone].    We comply with applicable federal civil rights laws and [place] laws. We do not discriminate on the basis of race, color, national origin, age, disability, sex, sexual orientation, or gender identity.            Thank you!     Thank you for choosing [facility]  for your care. Our goal is always to provide you with excellent care. Hearing back from our patients is one way we can continue to improve our services. Please take a few minutes to complete the written survey that you may receive in the mail after your visit with us. Thank you!             Your Updated Medication List - Protect others around you: Learn how to safely use, store and throw away your medicines at www.disposemymeds.org.          This list is accurate as of 11/7/18 11:41 AM.  Always  use your most recent med list.                   Brand Name Dispense Instructions for use Diagnosis    fexofenadine 180 MG tablet    ALLEGRA    30 tablet    Take 1 tablet by mouth every morning.        fluticasone 50 MCG/ACT spray    FLONASE     Spray 2 sprays into both nostrils daily        order for DME     1 Device    Equipment being ordered: Please dispense a knee scooter for the patient to rent. She has foot surgery on 10/22/2018 and she will be picking up the knee scooter prior to the surgery. She will need the scooter for up to eight weeks from the date of the surgery. Thank you.    Hallux valgus (acquired), right foot, Foot pain, right       oxyCODONE-acetaminophen 5-325 MG per tablet    PERCOCET    30 tablet    Take 1-2 tablets by mouth every 6 hours as needed for pain (Post-op pain)    Hallux valgus (acquired), right foot       PROAIR  (90 Base) MCG/ACT inhaler   Generic drug:  albuterol     8.5 g    INHALE 2 PUFFS BY MOUTH EVERY 6 HOURS AS NEEDED FOR SHORTNESS OF BREATH    Acute bronchitis       triamcinolone 55 MCG/ACT inhaler    NASACORT AQ    2 Bottle    Spray 2 sprays into both nostrils daily    Perennial allergic rhinitis, Seasonal allergic rhinitis, unspecified chronicity, unspecified trigger, History of myringotomy, Dysfunction of left eustachian tube

## 2018-11-07 NOTE — NURSING NOTE
"Chief Complaint   Patient presents with     Surgical Followup     right foot       Initial /68 (BP Location: Right arm, Patient Position: Sitting, Cuff Size: Adult Regular)  Pulse 71  Temp 98.6  F (37  C) (Tympanic)  Wt 165 lb (74.8 kg)  SpO2 100%  BMI 25.09 kg/m2 Estimated body mass index is 25.09 kg/(m^2) as calculated from the following:    Height as of 10/22/18: 5' 8\" (1.727 m).    Weight as of this encounter: 165 lb (74.8 kg).  Medication Reconciliation: complete    Marisel Ruiz LPN  "

## 2018-11-07 NOTE — PROGRESS NOTES
Chief complaint: Patient presents with:  Surgical Followup: right foot      History of Present Illness: This 53 year old female is seen today for a post-op visit following RIGHT foot distal chevron osteotomy of the 1st metatarsal (DOS 10/22/2018) and to have her sutures removed today. She admits to very little pain in her foot. She was sleeping with her CAM boot on at night, but she has kept it off the last couple nights. She has her knee scooter right by her bed so she is not applying any weight to her foot. 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 She is wondering when she can start showering. She has kept her dressing clean, dry and intact. No further pedal complaints today.    /68 (BP Location: Right arm, Patient Position: Sitting, Cuff Size: Adult Regular)  Pulse 71  Temp 98.6  F (37  C) (Tympanic)  Wt 165 lb (74.8 kg)  SpO2 100%  BMI 25.09 kg/m2    Patient Active Problem List   Diagnosis     Multiple respiratory allergies     ACP (advance care planning)       Past Surgical History:   Procedure Laterality Date     BUNIONECTOMY Right 10/22/2018    Procedure: RIGHT FOOT 1ST METATARSAL DISTAL CHEVERON OSTEOTOMY AND LATERAL SOFT FISSUE RELEASE;  Surgeon: Netta Soni DPM;  [facility]     BUNIONECTOMY RT/LT  2008     COLONOSCOPY N/A 10/30/2017    Procedure: COLONOSCOPY;  WHOLE COLON COLONOSCOPY ;  Surgeon: Erma Jenkins MD;  Location: [facility]     ENT SURGERY  10/2013    nose surgery, Dr. Elliott     EXCISE MASS LOWER EXTREMITY Right 2017    Procedure: EXCISE MASS LOWER EXTREMITY;  EXCISION SOFT TISSUE MASS RIGHT LOWER LEG;  Surgeon: Erma Jenkins MD;  Location: [facility]            laparoscopic supracervical hysterectomy      Fibroids, ovaries intact     Left Breast Bx  3/2007    Fibrocystic breast disease     PE tube placement       ventilation tube, left         Current  Outpatient Prescriptions   Medication     ALBUTEROL 108 (90 BASE) MCG/ACT inhaler     fexofenadine (ALLEGRA) 180 MG tablet     fluticasone (FLONASE) 50 MCG/ACT nasal spray     order for DME     oxyCODONE-acetaminophen (PERCOCET) 5-325 MG per tablet     triamcinolone (NASACORT AQ) 55 MCG/ACT Inhaler     No current facility-administered medications for this visit.         Allergies   Allergen Reactions     Seasonal Allergies        Family History   Problem Relation Age of Onset     C.A.D. Father      Diabetes No family hx of      Cerebrovascular Disease No family hx of        Social History     Social History     Marital status:      Spouse name: N/A     Number of children: N/A     Years of education: N/A     Occupational History     cosmotologist      Full-time     Social History Main Topics     Smoking status: Never Smoker     Smokeless tobacco: Never Used      Comment: no passive exposure     Alcohol use Yes      Comment: rarely     Drug use: No     Sexual activity: Not on file     Other Topics Concern     Caffeine Concern Yes     coffee, 1 cup daily     Parent/Sibling W/ Cabg, Mi Or Angioplasty Before 65f 55m? No     Social History Narrative       ROS: 10 point ROS neg other than the symptoms noted above in the HPI.  EXAM  Constitutional: healthy, alert and no distress      Psychiatric: mentation appears normal and affect normal/bright      RIGHT FOOT FOCUSED:     VASCULAR:  -Dorsalis pedis pulse +2/4  -Posterior tibial pulse +2/4  -Telangiectasias and varicosities present to ankle and posterior leg  NEURO:  -Light touch sensation intact to b/l plantar forefoot  DERM:  -Incision to RIGHT dorsal 1st ray with skin edges well approximated and no dehiscence.   -No dehiscence of wound site post removal of sutures.  -Minimal erythema, edema and calor near incision site--par with post-operative course. No purulence, no moderate erythema, no ascending erythema, no malodor, no pain, no SOI  -Skin temperature,  texture and turgor WNL b/l  -Toenails normotrophic x 5  MSK:  -Mild tenderness around incision site--par with post-operative site      RADIOGRAPHS RIGHT FOOT 08/29/2018      FINDINGS: There is a mild hallux valgus deformity. There is no  significant degenerative change in the first metatarsal phalangeal  joint.      No fracture or dislocation is seen. There is no significant  degenerative change.          IMPRESSION: Mild hallux valgus deformity.      LISSETTE HAHN MD      RADIOGRAPHS LEFT FOOT 08/29/2018  FINDINGS: There has been previous bunion surgery with postsurgical  changes in the first metatarsal. There is no significant hallux valgus  deformity. There is mild degenerative change of the first metatarsal  phalangeal joint.      No fracture, dislocation or other significant degenerative change is  seen.          IMPRESSION: Postsurgical changes in the first metatarsal.      LISSETTE HAHN MD    X-RAY RIGHT FOOT 11/07/2018  FINDINGS:  The osteotomy has been performed in the first metatarsal  fixed with 2 screws. No change in appearance of the fifth metatarsal  is seen as compared to previous examination. The remainder the foot  appears intact.          IMPRESSION: Stable appearance of the foot as compared October 25, 2018         ABEL PEREIRA MD  ============================================================      ASSESSMENT:  (M20.11) Hallux valgus (acquired), right foot  (primary encounter diagnosis)      (M79.671) Foot pain, right      (I83.93) Varicose veins of both lower extremities        PLAN:  -Patient evaluated and examined. Treatment options discussed with no educational barriers noted.  -Sutures removed -- no dehiscence. Applied three thin strips of Medipore tape over incision. Patient to leave these alone and let them fall off on their own. Wrapped foot with dry gauze, Medipore tape, and used Kerlix and coban to wrap the foot up to the level of the knee to control edema. Patient to continue  with dressing the incision site with a dry gazue and bandage for one week and to continue light compression wraps with cast padding/coban or ACE until next appointment to control edema.  -Reviewed today's radiographs with patient  -Ordered radiographs NWB for patient to get in [facility] prior to next appointment   -Dressed wound with dry gauze and Medipore tape.  -Patient may start to shower in two days (Friday, 11/09/2018)  -Patient may start light passive ROM of the MTPJ while she is NWB at home  -Patient to remain NWB in a CAM boot. 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   -Continue crutches/knee scooter  -Patient will start transitioning from a CAM boot to a stability shoe between weeks 6 and 8 if she is showing radiographs signs of a healed osteotomy site.  -All of patients questions were answered and she was in agreement with the above treatment plan     RTC three weeks with x-rays prior      Netta Soni DPM

## 2018-11-25 ENCOUNTER — MYC MEDICAL ADVICE (OUTPATIENT)
Dept: PODIATRY | Facility: OTHER | Age: 53
End: 2018-11-25

## 2018-11-26 NOTE — TELEPHONE ENCOUNTER
----- Message from Antonia Malave LPN sent at 11/26/2018  9:13 AM CST -----  Regarding: XRAY  Contact: [phone]  Hello, Patient just called because she has an appointment to see Dr. Soni on Wednesday-11/28/18, however her xray is scheduled in [facility] on that day. Patient lives in [place] and would like it to be changed.

## 2018-11-28 ENCOUNTER — RADIANT APPOINTMENT (OUTPATIENT)
Dept: GENERAL RADIOLOGY | Facility: OTHER | Age: 53
End: 2018-11-28
Attending: PODIATRIST
Payer: COMMERCIAL

## 2018-11-28 ENCOUNTER — OFFICE VISIT (OUTPATIENT)
Dept: PODIATRY | Facility: OTHER | Age: 53
End: 2018-11-28
Attending: PODIATRIST
Payer: COMMERCIAL

## 2018-11-28 VITALS
RESPIRATION RATE: 16 BRPM | SYSTOLIC BLOOD PRESSURE: 108 MMHG | TEMPERATURE: 98.7 F | DIASTOLIC BLOOD PRESSURE: 74 MMHG | HEART RATE: 73 BPM

## 2018-11-28 DIAGNOSIS — I83.12 VARICOSE VEINS OF BOTH LOWER EXTREMITIES WITH INFLAMMATION: ICD-10-CM

## 2018-11-28 DIAGNOSIS — M79.671 FOOT PAIN, RIGHT: ICD-10-CM

## 2018-11-28 DIAGNOSIS — I83.11 VARICOSE VEINS OF BOTH LOWER EXTREMITIES WITH INFLAMMATION: ICD-10-CM

## 2018-11-28 DIAGNOSIS — M20.11 HALLUX VALGUS (ACQUIRED), RIGHT FOOT: ICD-10-CM

## 2018-11-28 DIAGNOSIS — M20.11 HALLUX VALGUS (ACQUIRED), RIGHT FOOT: Primary | ICD-10-CM

## 2018-11-28 PROCEDURE — 99024 POSTOP FOLLOW-UP VISIT: CPT | Performed by: PODIATRIST

## 2018-11-28 PROCEDURE — 73630 X-RAY EXAM OF FOOT: CPT | Mod: TC

## 2018-11-28 ASSESSMENT — PAIN SCALES - GENERAL: PAINLEVEL: NO PAIN (0)

## 2018-11-28 NOTE — PATIENT INSTRUCTIONS
-Monday, 12/03/2018, transition (slowly) to walking in your CAM walker boot.  -Monday, 12/10/2018, transition (slowly) into a stable walking shoe.  -Shoe Gear: This involves wearing a stability shoe that bends at the toe, but has a solid midfoot shank and heel contour.   -Return to clinic with x-rays prior on

## 2018-11-28 NOTE — NURSING NOTE
"Chief Complaint   Patient presents with     WOUND CARE       Initial /74 (BP Location: Left arm, Patient Position: Sitting, Cuff Size: Adult Regular)  Pulse 73  Temp 98.7  F (37.1  C) (Tympanic)  Resp 16 Estimated body mass index is 25.09 kg/(m^2) as calculated from the following:    Height as of 10/22/18: 1.727 m (5' 8\").    Weight as of 11/7/18: 74.8 kg (165 lb).  Medication Reconciliation: complete    Antonia Malave LPN  "

## 2018-11-28 NOTE — MR AVS SNAPSHOT
After Visit Summary   11/28/2018    Luly Freeman    MRN: [id number]           Patient Information     Date Of Birth          1965        Visit Information        Provider Department      11/28/2018 2:30 PM Netta Soni DPM [facility]        Today's Diagnoses     Hallux valgus (acquired), right foot    -  1    Foot pain, right        Varicose veins of both lower extremities with inflammation          Care Instructions    -Monday, 12/03/2018, transition (slowly) to walking in your CAM walker boot.  -Monday, 12/10/2018, transition (slowly) into a stable walking shoe.  -Shoe Gear: This involves wearing a stability shoe that bends at the toe, but has a solid midfoot shank and heel contour.   -Return to clinic with x-rays prior on             Follow-ups after your visit        Your next 10 appointments already scheduled     Dec 21, 2018  9:00 AM CST   (Arrive by 8:45 AM)   Post Op with Netta Soni DPM   [facility] ([facility] )    [address] [place] [id number] [phone]              Future tests that were ordered for you today     Open Future Orders        Priority Expected Expires Ordered    XR FOOT RT G/E 3 VW (Clinic Performed) Routine 12/21/2018 11/29/2019 11/29/2018            Who to contact     If you have questions or need follow up information about today's clinic visit or your schedule please contact [facility] directly at [phone].  Normal or non-critical lab and imaging results will be communicated to you by MyChart, letter or phone within 4 business days after the clinic has received the results. If you do not hear from us within 7 days, please contact the clinic through Mindj[employer] or phone. If you have a critical or abnormal lab result, we will notify you by phone as soon as possible.  Submit refill requests through [employer] Messenger or call your pharmacy and they will  forward the refill request to us. Please allow 3 business days for your refill to be completed.          Additional Information About Your Visit        [email or web address] Information     [employer] gives you secure access to your electronic health record. If you see a primary care provider, you can also send messages to your care team and make appointments. If you have questions, please call your primary care clinic.  If you do not have a primary care provider, please call [phone] and they will assist you.        Care EveryWhere ID     This is your Care EveryWhere ID. This could be used by other organizations to access your [facility] medical records  [id number]        Your Vitals Were     Pulse Temperature Respirations             73 98.7  F (37.1  C) (Tympanic) 16          Blood Pressure from Last 3 Encounters:   11/28/18 108/74   11/07/18 102/68   10/25/18 121/74    Weight from Last 3 Encounters:   11/07/18 165 lb (74.8 kg)   10/22/18 165 lb (74.8 kg)   10/15/18 160 lb (72.6 kg)               Primary Care Provider Office Phone # Fax #    Tere Herrera [phone] [address] [facility] [address]        Equal Access to Services     ORION ALATORRE AH: Hadii dmitri paredes hadasho Soomaali, waaxda luqadaha, qaybta kaalmada adeegyada, suleman thomas. So [facility] [phone].    ATENCIÓN: Si habla español, tiene a morocho disposición servicios gratuitos de asistencia lingüística. Llame al [phone].    We comply with applicable federal civil rights laws and [place] laws. We do not discriminate on the basis of race, color, national origin, age, disability, sex, sexual orientation, or gender identity.            Thank you!     Thank you for choosing [facility]  for your care. Our goal is always to provide you with excellent care. Hearing back from our patients is one way we can continue to improve our services. Please take a few minutes to complete the written  survey that you may receive in the mail after your visit with us. Thank you!             Your Updated Medication List - Protect others around you: Learn how to safely use, store and throw away your medicines at www.disposemymeds.org.          This list is accurate as of 11/28/18 11:59 PM.  Always use your most recent med list.                   Brand Name Dispense Instructions for use Diagnosis    fexofenadine 180 MG tablet    ALLEGRA    30 tablet    Take 1 tablet by mouth every morning.        fluticasone 50 MCG/ACT nasal spray    FLONASE     Spray 2 sprays into both nostrils daily        order for DME     1 Device    Equipment being ordered: Please dispense a knee scooter for the patient to rent. She has foot surgery on 10/22/2018 and she will be picking up the knee scooter prior to the surgery. She will need the scooter for up to eight weeks from the date of the surgery. Thank you.    Hallux valgus (acquired), right foot, Foot pain, right       oxyCODONE-acetaminophen 5-325 MG tablet    PERCOCET    30 tablet    Take 1-2 tablets by mouth every 6 hours as needed for pain (Post-op pain)    Hallux valgus (acquired), right foot       PROAIR  (90 Base) MCG/ACT inhaler   Generic drug:  albuterol     8.5 g    INHALE 2 PUFFS BY MOUTH EVERY 6 HOURS AS NEEDED FOR SHORTNESS OF BREATH    Acute bronchitis       triamcinolone 55 MCG/ACT nasal aerosol    NASACORT AQ    2 Bottle    Spray 2 sprays into both nostrils daily    Perennial allergic rhinitis, Seasonal allergic rhinitis, unspecified chronicity, unspecified trigger, History of myringotomy, Dysfunction of left eustachian tube

## 2018-11-28 NOTE — PROGRESS NOTES
Chief complaint: Patient presents with:  WOUND CARE      History of Present Illness: This 53 year old female is seen today for a post-op visit following RIGHT foot distal chevron osteotomy of the 1st metatarsal (DOS 10/22/2018). Her incision had a mild dehiscence after her sutures were removed and she has been covering it daily with a dry gauze and a bandage. She had mild drainage on the bandage today. She admits to very little pain in her foot. She has walked a couple times without her boot on, but she still wears her CAM boot the majority of the time. 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   She is wondering when she can start walking. No further pedal complaints today.    /74 (BP Location: Left arm, Patient Position: Sitting, Cuff Size: Adult Regular)  Pulse 73  Temp 98.7  F (37.1  C) (Tympanic)  Resp 16    Patient Active Problem List   Diagnosis     Multiple respiratory allergies     ACP (advance care planning)       Past Surgical History:   Procedure Laterality Date     BUNIONECTOMY Right 10/22/2018    Procedure: RIGHT FOOT 1ST METATARSAL DISTAL CHEVERON OSTEOTOMY AND LATERAL SOFT FISSUE RELEASE;  Surgeon: Netta Soni DPM;  [facility]     BUNIONECTOMY RT/LT  2008     COLONOSCOPY N/A 10/30/2017    Procedure: COLONOSCOPY;  WHOLE COLON COLONOSCOPY ;  Surgeon: Erma Jenkins MD;  [facility]: [facility]     ENT SURGERY  10/2013    nose surgery, Dr. Elliott     EXCISE MASS LOWER EXTREMITY Right 2017    Procedure: EXCISE MASS LOWER EXTREMITY;  EXCISION SOFT TISSUE MASS RIGHT LOWER LEG;  Surgeon: Erma Jenkins MD;  [facility]            laparoscopic supracervical hysterectomy      Fibroids, ovaries intact     Left Breast Bx  3/2007    Fibrocystic breast disease     PE tube placement       ventilation tube, left         Current Outpatient  Prescriptions   Medication     ALBUTEROL 108 (90 BASE) MCG/ACT inhaler     fexofenadine (ALLEGRA) 180 MG tablet     fluticasone (FLONASE) 50 MCG/ACT nasal spray     order for DME     oxyCODONE-acetaminophen (PERCOCET) 5-325 MG per tablet     triamcinolone (NASACORT AQ) 55 MCG/ACT Inhaler     No current facility-administered medications for this visit.           Allergies   Allergen Reactions     Seasonal Allergies        Family History   Problem Relation Age of Onset     C.A.D. Father      Diabetes No family hx of      Cerebrovascular Disease No family hx of        Social History     Social History     Marital status:      Spouse name: N/A     Number of children: N/A     Years of education: N/A     Occupational History     cosmotologist      Full-time     Social History Main Topics     Smoking status: Never Smoker     Smokeless tobacco: Never Used      Comment: no passive exposure     Alcohol use Yes      Comment: rarely     Drug use: No     Sexual activity: Not Asked     Other Topics Concern     Caffeine Concern Yes     coffee, 1 cup daily     Parent/Sibling W/ Cabg, Mi Or Angioplasty Before 65f 55m? No     Social History Narrative       ROS: 10 point ROS neg other than the symptoms noted above in the HPI.  EXAM  Constitutional: healthy, alert and no distress      Psychiatric: mentation appears normal and affect normal/bright      RIGHT FOOT FOCUSED:      VASCULAR:  -Dorsalis pedis pulse +2/4  -Posterior tibial pulse +2/4  -Telangiectasias and varicosities present to ankle and posterior leg  -Mild non-pitting edema to b/l legs  NEURO:  -Light touch sensation intact to b/l plantar forefoot  DERM:  -Incision to RIGHT dorsal 1st ray with skin edges well approximated with exception of a small dehiscence in the central aspect of the wound estimated to measure 0.3cm x 0.2cm x superficial through the epidermis only. No active drainage at this time.   -Mild edema and minimal erythema near incision site--par with  post-operative course. No purulence, no moderate erythema, no ascending erythema, no malodor, no pain, no SOI  -Skin temperature, texture and turgor WNL b/l  -Toenails normotrophic x 5  MSK:  -Incision site is numb with palpation--par with post-operative site      RADIOGRAPHS RIGHT FOOT 08/29/2018      FINDINGS: There is a mild hallux valgus deformity. There is no  significant degenerative change in the first metatarsal phalangeal  joint.      No fracture or dislocation is seen. There is no significant  degenerative change.          IMPRESSION: Mild hallux valgus deformity.      LISSETTE HAHN MD      RADIOGRAPHS LEFT FOOT 08/29/2018  FINDINGS: There has been previous bunion surgery with postsurgical  changes in the first metatarsal. There is no significant hallux valgus  deformity. There is mild degenerative change of the first metatarsal  phalangeal joint.      No fracture, dislocation or other significant degenerative change is  seen.          IMPRESSION: Postsurgical changes in the first metatarsal.      LISSETTE HAHN MD     X-RAY RIGHT FOOT 11/07/2018  FINDINGS:  The osteotomy has been performed in the first metatarsal  fixed with 2 screws. No change in appearance of the fifth metatarsal  is seen as compared to previous examination. The remainder the foot  appears intact.           IMPRESSION: Stable appearance of the foot as compared October 25, 2018          ABEL PEREIRA MD  ============================================================      ASSESSMENT:  (M20.11) Hallux valgus (acquired), right foot  (primary encounter diagnosis)      (M79.671) Foot pain, right      (I83.93) Varicose veins of both lower extremities        PLAN:  -Patient evaluated and examined. Treatment options discussed with no educational barriers noted.  -Reviewed today's radiographs with patient -- her osteotomy site continues to heal  -Ordered radiographs NWB for patient to get in [place] (clinic weightbearing) prior to next  appointment   -Dressed wound with dry gauze and Medipore tape.  -Discussed [employer] with patient. She may use this to decrease scar formation after the small dehiscence site is completely healed with no drainage on her bandage.  -Patient given the following instructions:  1. Monday, 12/03/2018, transition (slowly) to walking in your CAM walker boot.  2. Monday, 12/10/2018, transition (slowly) into a stable walking shoe.  3. Shoe Gear: This involves wearing a stability shoe that bends at the toe, but has a solid midfoot shank and heel contour.   -All of patients questions were answered and she was in agreement with the above treatment plan      RTC three weeks with x-rays prior on 12/21/2018      Netta Soni DPM

## 2018-12-04 NOTE — PROGRESS NOTES
SUBJECTIVE:       Luly Freeman is a 53 year old female with a 1 week history of nasal congestion, PND, sore throat, earache, cough, fever. Denies nausea, vomiting, diarrhea                  Sore Throat      Duration: 5 days    Description  sore throat, ear pain left and headache, left neck sore, drainage    Severity: mild    Accompanying signs and symptoms: None see above    History (predisposing factors):  Has hx of sinus and strep    Precipitating or alleviating factors: None    Therapies tried and outcome:  Marcy pot, tylenol, cough drops          Problem list and histories reviewed & adjusted, as indicated.  Additional history: as documented    Patient Active Problem List   Diagnosis     Multiple respiratory allergies     ACP (advance care planning)     Past Surgical History:   Procedure Laterality Date     BUNIONECTOMY Right 10/22/2018    Procedure: RIGHT FOOT 1ST METATARSAL DISTAL CHEVERON OSTEOTOMY AND LATERAL SOFT FISSUE RELEASE;  Surgeon: Netta Soni DPM;  [facility]     BUNIONECTOMY RT/LT  2008     COLONOSCOPY N/A 10/30/2017    Procedure: COLONOSCOPY;  WHOLE COLON COLONOSCOPY ;  Surgeon: Erma Jenkins MD;  [facility]: [facility]     ENT SURGERY  10/2013    nose surgery, Dr. Elliott     EXCISE MASS LOWER EXTREMITY Right 2017    Procedure: EXCISE MASS LOWER EXTREMITY;  EXCISION SOFT TISSUE MASS RIGHT LOWER LEG;  Surgeon: Erma Jenkins MD;  Location: [facility]            laparoscopic supracervical hysterectomy      Fibroids, ovaries intact     Left Breast Bx  3/2007    Fibrocystic breast disease     PE tube placement       ventilation tube, left         Social History   Substance Use Topics     Smoking status: Never Smoker     Smokeless tobacco: Never Used      Comment: no passive exposure     Alcohol use Yes      Comment: rarely     Family History   Problem Relation Age of Onset     C.A.D. Father      Diabetes No family hx of      Cerebrovascular Disease No family hx  "of          Current Outpatient Prescriptions   Medication Sig Dispense Refill     amoxicillin-clavulanate (AUGMENTIN) 875-125 MG tablet Take 1 tablet by mouth 2 times daily 28 tablet 0     ALBUTEROL 108 (90 BASE) MCG/ACT inhaler INHALE 2 PUFFS BY MOUTH EVERY 6 HOURS AS NEEDED FOR SHORTNESS OF BREATH (Patient not taking: Reported on 10/10/2018) 8.5 g 0     Allergies   Allergen Reactions     Seasonal Allergies        Reviewed and updated as needed this visit by clinical staff       Reviewed and updated as needed this visit by Provider         ROS:  Constitutional, HEENT, cardiovascular, pulmonary, gi and gu systems are negative, except as otherwise noted.    OBJECTIVE:                                                    /78  Pulse 87  Temp 98.4  F (36.9  C) (Tympanic)  Resp 18  Ht 5' 8\" (1.727 m)  Wt 170 lb (77.1 kg)  SpO2 98%  BMI 25.85 kg/m2  Body mass index is 25.85 kg/(m^2).  General: Alert, oriented. In NAD  Skin: warm and dry. No suspicious rash, lesions.  HEENT: EAC's and TM's intact. Posterior pharynx moist and pink without edema or exudate. Nasal mucosa edematous, erythematous. Maxillary sinuses TTP.Neck is supple. No lymphadenopathy.  Resp: Lungs CTA without wheeze, rale or rhonchi.  Cardiac: RRR. Normal S1, S2. No murmur.  Psych. Mood euthymic with corresponding affect           ASSESSMENT/PLAN:                                                    (J01.01) Acute recurrent maxillary sinusitis  (primary encounter diagnosis  Plan: amoxicillin-clavulanate (AUGMENTIN) 875-125 MG         tablet            (J02.9) Sore throat  Comment: neg  Plan: Rapid strep screen, Beta strep group A culture                Symptomatic cares including rest, increased fluids, Tylenol for fever or discomfort. Return to clinic if symptoms persist or worsen    [facility]  [facility]  "

## 2018-12-05 ENCOUNTER — OFFICE VISIT (OUTPATIENT)
Dept: FAMILY MEDICINE | Facility: OTHER | Age: 53
End: 2018-12-05
Attending: PHYSICIAN ASSISTANT
Payer: COMMERCIAL

## 2018-12-05 VITALS
SYSTOLIC BLOOD PRESSURE: 118 MMHG | HEIGHT: 68 IN | BODY MASS INDEX: 25.76 KG/M2 | HEART RATE: 87 BPM | OXYGEN SATURATION: 98 % | TEMPERATURE: 98.4 F | DIASTOLIC BLOOD PRESSURE: 78 MMHG | RESPIRATION RATE: 18 BRPM | WEIGHT: 170 LBS

## 2018-12-05 DIAGNOSIS — J01.01 ACUTE RECURRENT MAXILLARY SINUSITIS: Primary | ICD-10-CM

## 2018-12-05 DIAGNOSIS — J02.9 SORE THROAT: ICD-10-CM

## 2018-12-05 LAB
DEPRECATED S PYO AG THROAT QL EIA: NORMAL
SPECIMEN SOURCE: NORMAL

## 2018-12-05 PROCEDURE — 87081 CULTURE SCREEN ONLY: CPT | Performed by: PHYSICIAN ASSISTANT

## 2018-12-05 PROCEDURE — 87880 STREP A ASSAY W/OPTIC: CPT | Performed by: PHYSICIAN ASSISTANT

## 2018-12-05 PROCEDURE — 99213 OFFICE O/P EST LOW 20 MIN: CPT | Performed by: PHYSICIAN ASSISTANT

## 2018-12-05 ASSESSMENT — ANXIETY QUESTIONNAIRES
GAD7 TOTAL SCORE: 0
2. NOT BEING ABLE TO STOP OR CONTROL WORRYING: NOT AT ALL
7. FEELING AFRAID AS IF SOMETHING AWFUL MIGHT HAPPEN: NOT AT ALL
5. BEING SO RESTLESS THAT IT IS HARD TO SIT STILL: NOT AT ALL
6. BECOMING EASILY ANNOYED OR IRRITABLE: NOT AT ALL
1. FEELING NERVOUS, ANXIOUS, OR ON EDGE: NOT AT ALL
IF YOU CHECKED OFF ANY PROBLEMS ON THIS QUESTIONNAIRE, HOW DIFFICULT HAVE THESE PROBLEMS MADE IT FOR YOU TO DO YOUR WORK, TAKE CARE OF THINGS AT HOME, OR GET ALONG WITH OTHER PEOPLE: NOT DIFFICULT AT ALL
3. WORRYING TOO MUCH ABOUT DIFFERENT THINGS: NOT AT ALL

## 2018-12-05 ASSESSMENT — PATIENT HEALTH QUESTIONNAIRE - PHQ9
SUM OF ALL RESPONSES TO PHQ QUESTIONS 1-9: 4
5. POOR APPETITE OR OVEREATING: NOT AT ALL

## 2018-12-05 ASSESSMENT — PAIN SCALES - GENERAL: PAINLEVEL: SEVERE PAIN (7)

## 2018-12-05 NOTE — NURSING NOTE
"Chief Complaint   Patient presents with     Pharyngitis       Initial /78  Pulse 87  Temp 98.4  F (36.9  C) (Tympanic)  Resp 18  Ht 5' 8\" (1.727 m)  Wt 170 lb (77.1 kg)  SpO2 98%  BMI 25.85 kg/m2 Estimated body mass index is 25.85 kg/(m^2) as calculated from the following:    Height as of this encounter: 5' 8\" (1.727 m).    Weight as of this encounter: 170 lb (77.1 kg).  Medication Reconciliation: complete    Kasey Medrano LPN  "

## 2018-12-05 NOTE — MR AVS SNAPSHOT
After Visit Summary   12/5/2018    Luly Freeman    MRN: [id number]           Patient Information     Date Of Birth          1965        Visit Information        Provider Department      12/5/2018 8:30 AM Michelle Montenegro PA [facility]        Today's Diagnoses     Acute recurrent maxillary sinusitis    -  1    Sore throat           Follow-ups after your visit        Your next 10 appointments already scheduled     Dec 21, 2018  9:00 AM CST   (Arrive by 8:45 AM)   Post Op with Netta Soni DPM   [facility] )    [address] [place] [id number] [phone]              Who to contact     If you have questions or need follow up information about today's clinic visit or your schedule please contact [facility] directly at [phone].  Normal or non-critical lab and imaging results will be communicated to you by MyChart, letter or phone within 4 business days after the clinic has received the results. If you do not hear from us within 7 days, please contact the clinic through MyChart or phone. If you have a critical or abnormal lab result, we will notify you by phone as soon as possible.  Submit refill requests through [employer] or call your pharmacy and they will forward the refill request to us. Please allow 3 business days for your refill to be completed.          Additional Information About Your Visit        MyChart Information     [employer] gives you secure access to your electronic health record. If you see a primary care provider, you can also send messages to your care team and make appointments. If you have questions, please call your primary care clinic.  If you do not have a primary care provider, please call [phone] and they will assist you.        Care EveryWhere ID     This is your Care EveryWhere ID. This could be used by other organizations to access your  "[facility] medical records  [id number]        Your Vitals Were     Pulse Temperature Respirations Height Pulse Oximetry BMI (Body Mass Index)    87 98.4  F (36.9  C) (Tympanic) 18 5' 8\" (1.727 m) 98% 25.85 kg/m2       Blood Pressure from Last 3 Encounters:   12/05/18 118/78   11/28/18 108/74   11/07/18 102/68    Weight from Last 3 Encounters:   12/05/18 170 lb (77.1 kg)   11/07/18 165 lb (74.8 kg)   10/22/18 165 lb (74.8 kg)              We Performed the Following     Beta strep group A culture     Rapid strep screen          Today's Medication Changes          These changes are accurate as of 12/5/18 11:21 AM.  If you have any questions, ask your nurse or doctor.               Start taking these medicines.        Dose/Directions    amoxicillin-clavulanate 875-125 MG tablet   Commonly known as:  AUGMENTIN   Used for:  Acute recurrent maxillary sinusitis   Started by:  Michelle Montenegro PA        Dose:  1 tablet   Take 1 tablet by mouth 2 times daily   Quantity:  28 tablet   Refills:  0         Stop taking these medicines if you haven't already. Please contact your care team if you have questions.     fexofenadine 180 MG tablet   Commonly known as:  ALLEGRA   Stopped by:  Michelle Montenegro PA           fluticasone 50 MCG/ACT nasal spray   Commonly known as:  FLONASE   Stopped by:  Michelle Montenegro PA           order for DME   Stopped by:  Michelle Montenegro PA           oxyCODONE-acetaminophen 5-325 MG tablet   Commonly known as:  PERCOCET   Stopped by:  Michelle Montenegro PA           triamcinolone 55 MCG/ACT nasal aerosol   Commonly known as:  NASACORT AQ   Stopped by:  Michelle Montenegro PA                Where to get your medicines      These medications were sent to [facility] - [place], [place] - [address] AT [facility]  [address], [place] [address]     Phone:  [phone]     amoxicillin-clavulanate 875-125 MG tablet                Primary Care Provider Office " Phone # Fax #    Tere Herrera [phone] [address] [address]        Equal Access to Services     ORION ALATORRE : Hadii aad ku hadsandyshaila Soshine, wananyda luqokha, qawichota kaalmada emmanuel, suleman álvarez alejandrinaelsie hu laAstondeepika thomas. So [facility] [phone].    ATENCIÓN: Si habla español, tiene a morocho disposición servicios gratuitos de asistencia lingüística. Llame al [phone].    We comply with applicable federal civil rights laws and [place] laws. We do not discriminate on the basis of race, color, national origin, age, disability, sex, sexual orientation, or gender identity.            Thank you!     Thank you for choosing [facility]  for your care. Our goal is always to provide you with excellent care. Hearing back from our patients is one way we can continue to improve our services. Please take a few minutes to complete the written survey that you may receive in the mail after your visit with us. Thank you!             Your Updated Medication List - Protect others around you: Learn how to safely use, store and throw away your medicines at www.disposemymeds.org.          This list is accurate as of 12/5/18 11:21 AM.  Always use your most recent med list.                   Brand Name Dispense Instructions for use Diagnosis    amoxicillin-clavulanate 875-125 MG tablet    AUGMENTIN    28 tablet    Take 1 tablet by mouth 2 times daily    Acute recurrent maxillary sinusitis       PROAIR  (90 Base) MCG/ACT inhaler   Generic drug:  albuterol     8.5 g    INHALE 2 PUFFS BY MOUTH EVERY 6 HOURS AS NEEDED FOR SHORTNESS OF BREATH    Acute bronchitis

## 2018-12-07 LAB
BACTERIA SPEC CULT: NORMAL
SPECIMEN SOURCE: NORMAL

## 2018-12-07 ASSESSMENT — ANXIETY QUESTIONNAIRES: GAD7 TOTAL SCORE: 0

## 2018-12-21 ENCOUNTER — OFFICE VISIT (OUTPATIENT)
Dept: PODIATRY | Facility: OTHER | Age: 53
End: 2018-12-21
Attending: PODIATRIST
Payer: COMMERCIAL

## 2018-12-21 VITALS
HEART RATE: 76 BPM | BODY MASS INDEX: 25.09 KG/M2 | OXYGEN SATURATION: 99 % | DIASTOLIC BLOOD PRESSURE: 58 MMHG | TEMPERATURE: 97.5 F | SYSTOLIC BLOOD PRESSURE: 102 MMHG | WEIGHT: 165 LBS

## 2018-12-21 DIAGNOSIS — M79.671 FOOT PAIN, RIGHT: ICD-10-CM

## 2018-12-21 DIAGNOSIS — I83.11 VARICOSE VEINS OF BOTH LOWER EXTREMITIES WITH INFLAMMATION: ICD-10-CM

## 2018-12-21 DIAGNOSIS — I83.12 VARICOSE VEINS OF BOTH LOWER EXTREMITIES WITH INFLAMMATION: ICD-10-CM

## 2018-12-21 DIAGNOSIS — M20.11 HALLUX VALGUS (ACQUIRED), RIGHT FOOT: Primary | ICD-10-CM

## 2018-12-21 DIAGNOSIS — M20.11 HALLUX VALGUS (ACQUIRED), RIGHT FOOT: ICD-10-CM

## 2018-12-21 PROCEDURE — 99024 POSTOP FOLLOW-UP VISIT: CPT | Performed by: PODIATRIST

## 2018-12-21 PROCEDURE — 73630 X-RAY EXAM OF FOOT: CPT | Mod: TC

## 2018-12-21 ASSESSMENT — PAIN SCALES - GENERAL: PAINLEVEL: NO PAIN (1)

## 2018-12-21 NOTE — PROGRESS NOTES
"Chief complaint: Patient presents with:  Post-op Visit: right foot      History of Present Illness: This 53 year old female is seen today for a post-op visit following RIGHT foot distal chevron osteotomy of the 1st metatarsal (DOS 10/22/2018). Her pain can sometimes reach a +1 out of 10 on a VAS pain scale with an occasional \"twinge\" of pain, but she overall has very little pain in her foot. She has been wearing solid stability shoes for one week having transferred out of the [facility]. She had a scab in the central aspect oc her incision and she picked it which caused the wound to re-open. It is healed today, but she says she delayed the healing of the incision.She has been using C[employer] Care on the incision site. 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 She normally wears compression socks at work to control her foot/leg edema. No further pedal complaints today.      /58 (BP Location: Right arm, Patient Position: Sitting, Cuff Size: Adult Regular)   Pulse 76   Temp 97.5  F (36.4  C) (Tympanic)   Wt 74.8 kg (165 lb)   SpO2 99%   BMI 25.09 kg/m      Patient Active Problem List   Diagnosis     Multiple respiratory allergies     ACP (advance care planning)       Past Surgical History:   Procedure Laterality Date     BUNIONECTOMY Right 10/22/2018    Procedure: RIGHT FOOT 1ST METATARSAL DISTAL CHEVERON OSTEOTOMY AND LATERAL SOFT FISSUE RELEASE;  Surgeon: Netta Soni DPM;  Location: [facility]     BUNIONECTOMY RT/LT  2008     COLONOSCOPY N/A 10/30/2017    Procedure: COLONOSCOPY;  WHOLE COLON COLONOSCOPY ;  Surgeon: Erma Jenkins MD;  Location: [facility]     ENT SURGERY  10/2013    nose surgery, Dr. Elliott     EXCISE MASS LOWER EXTREMITY Right 2017    Procedure: EXCISE MASS LOWER EXTREMITY;  EXCISION SOFT TISSUE MASS RIGHT LOWER LEG;  Surgeon: Erma Jenkins MD;  Location: [facility] OR            laparoscopic supracervical " hysterectomy  2011    Fibroids, ovaries intact     Left Breast Bx  3/2007    Fibrocystic breast disease     PE tube placement       ventilation tube, left  2011       Current Outpatient Medications   Medication     ALBUTEROL 108 (90 BASE) MCG/ACT inhaler     amoxicillin-clavulanate (AUGMENTIN) 875-125 MG tablet     No current facility-administered medications for this visit.           Allergies   Allergen Reactions     Seasonal Allergies        Family History   Problem Relation Age of Onset     C.A.D. Father      Diabetes No family hx of      Cerebrovascular Disease No family hx of        Social History     Socioeconomic History     Marital status:      Spouse name: Not on file     Number of children: Not on file     Years of education: Not on file     Highest education level: Not on file   Social Needs     Financial resource strain: Not on file     Food insecurity - worry: Not on file     Food insecurity - inability: Not on file     Transportation needs - medical: Not on file     Transportation needs - non-medical: Not on file   Occupational History     Occupation: cosmotologist     Comment: Full-time   Tobacco Use     Smoking status: Never Smoker     Smokeless tobacco: Never Used     Tobacco comment: no passive exposure   Substance and Sexual Activity     Alcohol use: Yes     Comment: rarely     Drug use: No     Sexual activity: Not on file   Other Topics Concern      Service Not Asked     Blood Transfusions Not Asked     Caffeine Concern Yes     Comment: coffee, 1 cup daily     Occupational Exposure Not Asked     Hobby Hazards Not Asked     Sleep Concern Not Asked     Stress Concern Not Asked     Weight Concern Not Asked     Special Diet Not Asked     Back Care Not Asked     Exercise Not Asked     Bike Helmet Not Asked     Seat Belt Not Asked     Self-Exams Not Asked     Parent/sibling w/ CABG, MI or angioplasty before 65F 55M? No   Social History Narrative     Not on file       ROS: 10 point ROS  neg other than the symptoms noted above in the HPI.  EXAM  Constitutional: healthy, alert and no distress      Psychiatric: mentation appears normal and affect normal/bright      RIGHT FOOT FOCUSED:      VASCULAR:  -Dorsalis pedis pulse +2/4  -Posterior tibial pulse +2/4  -Telangiectasias and varicosities present to ankle and posterior leg  -Mild non-pitting edema to b/l legs and mild non-pitting edema to RIGHT foot    NEURO:  -Light touch sensation intact to b/l plantar forefoot    DERM:  -Incision to RIGHT dorsal 1st ray is healed with skin edges well approximated with no dehsicence. No drainage.  -Mild edema and minimal erythema near incision site--par with post-operative course. No purulence, no moderate erythema, no ascending erythema, no malodor, no pain, no SOI  -Skin temperature, texture and turgor WNL   -Toenails normotrophic x 5    MSK:  -Incision site is mildly numb with palpation--par with post-operative site      RADIOGRAPHS RIGHT FOOT 08/29/2018      FINDINGS: There is a mild hallux valgus deformity. There is no  significant degenerative change in the first metatarsal phalangeal  joint.      No fracture or dislocation is seen. There is no significant  degenerative change.          IMPRESSION: Mild hallux valgus deformity.      LISSETTE HAHN MD      RADIOGRAPHS LEFT FOOT 08/29/2018  FINDINGS: There has been previous bunion surgery with postsurgical  changes in the first metatarsal. There is no significant hallux valgus  deformity. There is mild degenerative change of the first metatarsal  phalangeal joint.      No fracture, dislocation or other significant degenerative change is  seen.          IMPRESSION: Postsurgical changes in the first metatarsal.      LISSETTE HAHN MD     X-RAY RIGHT FOOT 11/07/2018  FINDINGS:  The osteotomy has been performed in the first metatarsal  fixed with 2 screws. No change in appearance of the fifth metatarsal  is seen as compared to previous examination. The  remainder the foot  appears intact.           IMPRESSION: Stable appearance of the foot as compared October 25, 2018          ABEL PEREIRA MD    RADIOGRAPHS RIGHT FOOT ([facility]) 12/21/2018)  FINDINGS: There has been previous bunion surgery. 2 screws are seen in  the distal first metatarsal and there has been resection of the medial  head. Osteotomy appears to be healing.     No new abnormality is seen.                                                                        IMPRESSION: Stable postsurgical changes.     LISSETTE HAHN MD  ============================================================      ASSESSMENT:  (M20.11) Hallux valgus (acquired), right foot  (primary encounter diagnosis)      (M79.671) Foot pain, right      (I83.93) Varicose veins of both lower extremities        PLAN:  -Patient evaluated and examined. Treatment options discussed with no educational barriers noted.  -Reviewed today's radiographs with patient -- her osteotomy site continues to heal  -Continue Cica Care and compression socks.   -Patient advised to wear a toe spacer on her surgical foot between her hallux and 2nd digit to maintain correction.  -Patient's bunion could potentially recur in the future. If she notices it starting to return, she should follow-up with podiatry for insert and/or spinting options. She has CMOs that fit well to her foot (made by Dr. Chi's office 2+ years ago). Continue wearing CMOs for correction of biomechanics.  -All of patients questions were answered and she was in agreement with the above treatment plan        RTC as needed    Netta Soni DPM

## 2018-12-21 NOTE — NURSING NOTE
"Chief Complaint   Patient presents with     Post-op Visit     right foot       Initial There were no vitals taken for this visit. Estimated body mass index is 25.85 kg/m  as calculated from the following:    Height as of 12/5/18: 1.727 m (5' 8\").    Weight as of 12/5/18: 77.1 kg (170 lb).  Medication Reconciliation: complete    Marisel Ruiz LPN  "

## 2019-03-11 NOTE — PROGRESS NOTES
SUBJECTIVE:   Luly Freeman is a 53 year old female who presents to clinic today for the following health issues:      Hot flashes       Duration: 1.5 years- worse in last 2 weeks rash on upper chest area    Description (location/character/radiation): hot and cold flashes, hot flashes come on for about 12 minutes and sweats, not sleeping at night    Intensity:  moderate, severe    Accompanying signs and symptoms: rash and insomnia    History (similar episodes/previous evaluation): None    Precipitating or alleviating factors: None    Therapies tried and outcome: None     These are miserable for her, waking her up multiple times a night, causing nausea, lightheadedness, interfering with her work. She has had a hysterectomy    RESPIRATORY SYMPTOMS      Duration: 3 weeks    Description  facial pain/pressure and ear pain left    Severity: moderate    Accompanying signs and symptoms: None    History (predisposing factors):  none    Precipitating or alleviating factors: None    Therapies tried and outcome:  none  She will be flying to [place] March 26 and is concerned with having a sinus infection     Rash  Heat rash that comes with her hot flashes on the chest wall between the breasts. This is improving now. Not pruritic    Problem list and histories reviewed & adjusted, as indicated.  Additional history: as documented    Patient Active Problem List   Diagnosis     Multiple respiratory allergies     ACP (advance care planning)     Past Surgical History:   Procedure Laterality Date     BUNIONECTOMY Right 10/22/2018    Procedure: RIGHT FOOT 1ST METATARSAL DISTAL CHEVERON OSTEOTOMY AND LATERAL SOFT FISSUE RELEASE;  Surgeon: Netta Soni DPM;  Location: [facility]     BUNIONECTOMY RT/LT  4/2008     COLONOSCOPY N/A 10/30/2017    Procedure: COLONOSCOPY;  WHOLE COLON COLONOSCOPY ;  Surgeon: Erma Jenkins MD;  Location: [facility]     ENT SURGERY  10/2013    nose surgery, Dr. Elliott     EXCISE MASS LOWER EXTREMITY Right  "2017    Procedure: EXCISE MASS LOWER EXTREMITY;  EXCISION SOFT TISSUE MASS RIGHT LOWER LEG;  Surgeon: Erma Jenkins MD;  Location: [facility]            laparoscopic supracervical hysterectomy      Fibroids, ovaries intact     Left Breast Bx  3/2007    Fibrocystic breast disease     PE tube placement       ventilation tube, left         Social History     Tobacco Use     Smoking status: Never Smoker     Smokeless tobacco: Never Used     Tobacco comment: no passive exposure   Substance Use Topics     Alcohol use: Yes     Comment: rarely     Family History   Problem Relation Age of Onset     C.A.D. Father      Diabetes No family hx of      Cerebrovascular Disease No family hx of          Current Outpatient Medications   Medication Sig Dispense Refill     cefPROZIL (CEFZIL) 500 MG tablet Take 1 tablet (500 mg) by mouth 2 times daily for 14 days 28 tablet 0     estradiol (ESTRACE) 0.5 MG tablet Take 1 tablet (0.5 mg) by mouth daily 90 tablet 1     ALBUTEROL 108 (90 BASE) MCG/ACT inhaler INHALE 2 PUFFS BY MOUTH EVERY 6 HOURS AS NEEDED FOR SHORTNESS OF BREATH (Patient not taking: Reported on 2018) 8.5 g 0     Allergies   Allergen Reactions     Seasonal Allergies      BP Readings from Last 3 Encounters:   03/15/19 108/60   18 102/58   18 118/78    Wt Readings from Last 3 Encounters:   03/15/19 74.4 kg (164 lb)   18 74.8 kg (165 lb)   18 77.1 kg (170 lb)                    Reviewed and updated as needed this visit by clinical staff       Reviewed and updated as needed this visit by Provider         ROS:  Constitutional, HEENT, cardiovascular, pulmonary, gi and gu systems are negative, except as otherwise noted.    OBJECTIVE:                                                    /60   Pulse 74   Temp 96.8  F (36  C)   Ht 1.727 m (5' 8\")   Wt 74.4 kg (164 lb)   SpO2 99%   BMI 24.94 kg/m     Body mass index is 24.94 kg/m .  GENERAL APPEARANCE: healthy, alert and no " distress  EYES: Eyes grossly normal to inspection and conjunctivae and sclerae normal  HENT: ear canals and TM's normal and nose and mouth without ulcers or lesions  NECK: no adenopathy, no asymmetry, masses, or scars and thyroid normal to palpation  RESP: lungs clear to auscultation - no rales, rhonchi or wheezes  CV: regular rates and rhythm, normal S1 S2, no S3 or S4 and no murmur, click or rub  SKIN: resolving erythematous macules of the mid chest  NEURO: Normal strength and tone, mentation intact and speech normal  PSYCH: mentation appears normal and fatigued         ASSESSMENT/PLAN:                                                    1. Menopausal syndrome (hot flashes)  Severe symptoms will start low dose estrace for her daily. Advised she can increase this to 2 tablets if needed, recheck in 1 month, sooner for concerns. Discussed risks and benefits, treating for no more than 5 years, tapering off as soon as able  - estradiol (ESTRACE) 0.5 MG tablet; Take 1 tablet (0.5 mg) by mouth daily  Dispense: 90 tablet; Refill: 1    2. Heat rash  Resolving. Treating hot flashes will resolve this    3. Acute non-recurrent frontal sinusitis  Treat bid for 14 days, follow up if not improving  - cefPROZIL (CEFZIL) 500 MG tablet; Take 1 tablet (500 mg) by mouth 2 times daily for 14 days  Dispense: 28 tablet; Refill: 0      Follow up with Provider - 1 month     Tere Herrera MD  [facility] [place]

## 2019-03-15 ENCOUNTER — OFFICE VISIT (OUTPATIENT)
Dept: FAMILY MEDICINE | Facility: OTHER | Age: 54
End: 2019-03-15
Attending: FAMILY MEDICINE
Payer: COMMERCIAL

## 2019-03-15 VITALS
WEIGHT: 164 LBS | TEMPERATURE: 96.8 F | DIASTOLIC BLOOD PRESSURE: 60 MMHG | BODY MASS INDEX: 24.86 KG/M2 | HEART RATE: 74 BPM | HEIGHT: 68 IN | OXYGEN SATURATION: 99 % | SYSTOLIC BLOOD PRESSURE: 108 MMHG

## 2019-03-15 DIAGNOSIS — L74.0 HEAT RASH: ICD-10-CM

## 2019-03-15 DIAGNOSIS — J01.10 ACUTE NON-RECURRENT FRONTAL SINUSITIS: ICD-10-CM

## 2019-03-15 DIAGNOSIS — N95.1 MENOPAUSAL SYNDROME (HOT FLASHES): Primary | ICD-10-CM

## 2019-03-15 PROCEDURE — 99214 OFFICE O/P EST MOD 30 MIN: CPT | Performed by: FAMILY MEDICINE

## 2019-03-15 RX ORDER — ESTRADIOL 0.5 MG/1
0.5 TABLET ORAL DAILY
Qty: 90 TABLET | Refills: 1 | Status: SHIPPED | OUTPATIENT
Start: 2019-03-15 | End: 2019-04-15

## 2019-03-15 RX ORDER — CEFPROZIL 500 MG/1
500 TABLET, FILM COATED ORAL 2 TIMES DAILY
Qty: 28 TABLET | Refills: 0 | Status: SHIPPED | OUTPATIENT
Start: 2019-03-15 | End: 2019-07-08

## 2019-03-15 ASSESSMENT — MIFFLIN-ST. JEOR: SCORE: 1407.4

## 2019-03-15 ASSESSMENT — PAIN SCALES - GENERAL: PAINLEVEL: NO PAIN (0)

## 2019-03-15 NOTE — NURSING NOTE
"Chief Complaint   Patient presents with     Menopausal Sx       Initial /60   Pulse 74   Temp 96.8  F (36  C)   Ht 1.727 m (5' 8\")   Wt 74.4 kg (164 lb)   SpO2 99%   BMI 24.94 kg/m   Estimated body mass index is 24.94 kg/m  as calculated from the following:    Height as of this encounter: 1.727 m (5' 8\").    Weight as of this encounter: 74.4 kg (164 lb).  Medication Reconciliation: complete    Keon Moe LPN  "

## 2019-04-11 NOTE — PROGRESS NOTES
SUBJECTIVE:   Luly Freeman is a 53 year old female who presents to clinic today for the following   health issues:        Menopausal syndrome       Duration: 1 month follow up    Description (location/character/radiation): follow up on Estrace     Intensity:  0/10    Accompanying signs and symptoms: hot flashes     History (similar episodes/previous evaluation): None    Precipitating or alleviating factors: None    Therapies tried and outcome: Estrace 0.5 mg tablet      Hot flashes are improved on estrogen though not resolved but she can live with this. She doesn't want to go to a higher dose      Additional history: as documented    Reviewed  and updated as needed this visit by clinical staff         Reviewed and updated as needed this visit by Provider         Patient Active Problem List   Diagnosis     Multiple respiratory allergies     ACP (advance care planning)     Past Surgical History:   Procedure Laterality Date     ABDOMEN SURGERY       BREAST SURGERY  3/2007     BUNIONECTOMY Right 10/22/2018    Procedure: RIGHT FOOT 1ST METATARSAL DISTAL CHEVERON OSTEOTOMY AND LATERAL SOFT FISSUE RELEASE;  Surgeon: Netta Soni DPM;  [facility]: [facility]     BUNIONECTOMY RT/LT  2008     COLONOSCOPY N/A 10/30/2017    Procedure: COLONOSCOPY;  WHOLE COLON COLONOSCOPY ;  Surgeon: Erma Jenkins MD;  Location: [facility]     ENT SURGERY  10/2013    nose surgery, Dr. Elliott     EXCISE MASS LOWER EXTREMITY Right 2017    Procedure: EXCISE MASS LOWER EXTREMITY;  EXCISION SOFT TISSUE MASS RIGHT LOWER LEG;  Surgeon: Erma Jenkins MD;  Location: [facility]     EYE SURGERY  2018    Lasik surgery            GYN SURGERY      partial hysterectomy     laparoscopic supracervical hysterectomy      Fibroids, ovaries intact     Left Breast Bx  3/2007    Fibrocystic breast disease     PE tube placement       ventilation tube, left         Social History     Tobacco Use     Smoking status: Never Smoker      Smokeless tobacco: Never Used     Tobacco comment: no passive exposure   Substance Use Topics     Alcohol use: Yes     Comment: occasionally     Family History   Problem Relation Age of Onset     C.A.D. Father      Other Cancer Other         pancreatic cancer     Other Cancer Cousin         Pancreatic cancer     Thyroid Disease Paternal Grandmother      Diabetes No family hx of      Cerebrovascular Disease No family hx of      Other Cancer No family hx of          Current Outpatient Medications   Medication Sig Dispense Refill     estradiol (ESTRACE) 0.5 MG tablet Take 1 tablet (0.5 mg) by mouth daily 90 tablet 3     ALBUTEROL 108 (90 BASE) MCG/ACT inhaler INHALE 2 PUFFS BY MOUTH EVERY 6 HOURS AS NEEDED FOR SHORTNESS OF BREATH (Patient not taking: Reported on 4/15/2019) 8.5 g 0     Allergies   Allergen Reactions     Seasonal Allergies      BP Readings from Last 3 Encounters:   04/15/19 98/70   03/15/19 108/60   12/21/18 102/58    Wt Readings from Last 3 Encounters:   04/15/19 75.3 kg (166 lb)   03/15/19 74.4 kg (164 lb)   12/21/18 74.8 kg (165 lb)                    ROS:  Constitutional, HEENT, cardiovascular, pulmonary, gi and gu systems are negative, except as otherwise noted.    OBJECTIVE:                                                    BP 98/70 (BP Location: Right arm, Patient Position: Sitting, Cuff Size: Adult Regular)   Pulse 63   Temp 97  F (36.1  C) (Tympanic)   Wt 75.3 kg (166 lb)   SpO2 99%   BMI 25.24 kg/m    Body mass index is 25.24 kg/m .  GENERAL APPEARANCE: healthy, alert and no distress  NEURO: Normal strength and tone, mentation intact and speech normal  PSYCH: mentation appears normal and affect normal/bright         ASSESSMENT/PLAN:                                                    1. Menopausal syndrome (hot flashes)  Doing well, continue this dose, in the future when symptoms settle down, will wean off  - Lipid Profile; Future  - estradiol (ESTRACE) 0.5 MG tablet; Take 1 tablet  (0.5 mg) by mouth daily  Dispense: 90 tablet; Refill: 3    2. Encounter for screening mammogram for breast cancer  Will schedule physical as well  - MA SCREENING DIGITAL BILATERAL ([place]); Future    3. Lipid screening  Future order done      Follow up with Provider - 2 months for PE     Tere Herrera MD  [facility] [place]

## 2019-04-15 ENCOUNTER — OFFICE VISIT (OUTPATIENT)
Dept: FAMILY MEDICINE | Facility: OTHER | Age: 54
End: 2019-04-15
Attending: FAMILY MEDICINE
Payer: COMMERCIAL

## 2019-04-15 VITALS
TEMPERATURE: 97 F | DIASTOLIC BLOOD PRESSURE: 70 MMHG | OXYGEN SATURATION: 99 % | WEIGHT: 166 LBS | HEART RATE: 63 BPM | SYSTOLIC BLOOD PRESSURE: 98 MMHG | BODY MASS INDEX: 25.24 KG/M2

## 2019-04-15 DIAGNOSIS — Z13.220 LIPID SCREENING: ICD-10-CM

## 2019-04-15 DIAGNOSIS — Z12.31 ENCOUNTER FOR SCREENING MAMMOGRAM FOR BREAST CANCER: Primary | ICD-10-CM

## 2019-04-15 DIAGNOSIS — N95.1 MENOPAUSAL SYNDROME (HOT FLASHES): ICD-10-CM

## 2019-04-15 PROCEDURE — 99213 OFFICE O/P EST LOW 20 MIN: CPT | Performed by: FAMILY MEDICINE

## 2019-04-15 RX ORDER — ESTRADIOL 0.5 MG/1
0.5 TABLET ORAL DAILY
Qty: 90 TABLET | Refills: 3 | COMMUNITY
Start: 2019-04-15 | End: 2019-12-04

## 2019-04-15 ASSESSMENT — ASTHMA QUESTIONNAIRES
QUESTION_5 LAST FOUR WEEKS HOW WOULD YOU RATE YOUR ASTHMA CONTROL: COMPLETELY CONTROLLED
QUESTION_4 LAST FOUR WEEKS HOW OFTEN HAVE YOU USED YOUR RESCUE INHALER OR NEBULIZER MEDICATION (SUCH AS ALBUTEROL): NOT AT ALL
ACT_TOTALSCORE: 25
QUESTION_3 LAST FOUR WEEKS HOW OFTEN DID YOUR ASTHMA SYMPTOMS (WHEEZING, COUGHING, SHORTNESS OF BREATH, CHEST TIGHTNESS OR PAIN) WAKE YOU UP AT NIGHT OR EARLIER THAN USUAL IN THE MORNING: NOT AT ALL
QUESTION_1 LAST FOUR WEEKS HOW MUCH OF THE TIME DID YOUR ASTHMA KEEP YOU FROM GETTING AS MUCH DONE AT WORK, SCHOOL OR AT HOME: NONE OF THE TIME
ACUTE_EXACERBATION_TODAY: NO
QUESTION_2 LAST FOUR WEEKS HOW OFTEN HAVE YOU HAD SHORTNESS OF BREATH: NOT AT ALL

## 2019-04-15 ASSESSMENT — PAIN SCALES - GENERAL: PAINLEVEL: NO PAIN (0)

## 2019-04-15 NOTE — NURSING NOTE
"Chief Complaint   Patient presents with     menopause       Initial BP 98/70 (BP Location: Right arm, Patient Position: Sitting, Cuff Size: Adult Regular)   Pulse 63   Temp 97  F (36.1  C) (Tympanic)   Wt 75.3 kg (166 lb)   SpO2 99%   BMI 25.24 kg/m   Estimated body mass index is 25.24 kg/m  as calculated from the following:    Height as of 3/15/19: 1.727 m (5' 8\").    Weight as of this encounter: 75.3 kg (166 lb).  Medication Reconciliation: complete    Magdalena Wiggins LPN  "

## 2019-04-16 ASSESSMENT — ASTHMA QUESTIONNAIRES: ACT_TOTALSCORE: 25

## 2019-05-01 ENCOUNTER — TRANSFERRED RECORDS (OUTPATIENT)
Dept: HEALTH INFORMATION MANAGEMENT | Facility: CLINIC | Age: 54
End: 2019-05-01

## 2019-05-03 ENCOUNTER — OFFICE VISIT (OUTPATIENT)
Dept: PODIATRY | Facility: OTHER | Age: 54
End: 2019-05-03
Attending: PODIATRIST
Payer: COMMERCIAL

## 2019-05-03 VITALS
TEMPERATURE: 98 F | OXYGEN SATURATION: 100 % | DIASTOLIC BLOOD PRESSURE: 70 MMHG | HEART RATE: 69 BPM | SYSTOLIC BLOOD PRESSURE: 110 MMHG

## 2019-05-03 DIAGNOSIS — I83.12 VARICOSE VEINS OF BOTH LOWER EXTREMITIES WITH INFLAMMATION: ICD-10-CM

## 2019-05-03 DIAGNOSIS — M79.672 LEFT FOOT PAIN: ICD-10-CM

## 2019-05-03 DIAGNOSIS — I83.11 VARICOSE VEINS OF BOTH LOWER EXTREMITIES WITH INFLAMMATION: ICD-10-CM

## 2019-05-03 DIAGNOSIS — M79.671 RIGHT FOOT PAIN: Primary | ICD-10-CM

## 2019-05-03 PROCEDURE — 99213 OFFICE O/P EST LOW 20 MIN: CPT | Performed by: PODIATRIST

## 2019-05-03 ASSESSMENT — PAIN SCALES - GENERAL: PAINLEVEL: NO PAIN (0)

## 2019-05-03 NOTE — NURSING NOTE
"Chief Complaint   Patient presents with     Consult     orthodics       Initial /70 (BP Location: Left arm, Patient Position: Sitting, Cuff Size: Adult Regular)   Pulse 69   Temp 98  F (36.7  C) (Tympanic)   SpO2 100%  Estimated body mass index is 25.24 kg/m  as calculated from the following:    Height as of 3/15/19: 1.727 m (5' 8\").    Weight as of 4/15/19: 75.3 kg (166 lb).  Medication Reconciliation: complete    Marisel Ruiz LPN  "

## 2019-05-03 NOTE — PROGRESS NOTES
Chief complaint: Patient presents with:  Consult: orthodics      History of Present Illness: This 54 year old female is seen today for a a visit with new complaints of orthotic pain. She had two pairs of orthotics made by Dr. Chi's office (shorter CMO for sandals) a couple years ago and they are comfortable. One pair is full length and one pair is 3/4 length and they both have a mild metatarsal pad. She wears the 3/4 length met pad in her sandals in the summer, but a couple days ago, the firm orthotic portion broke in half. She is looking to have new ones made. She has noticeable pain relief when wearing the inserts and she has increased pain when she is not wearing orthotics. She is a  so she is on her feet all the time at work. She wears compression socks at work in the winter, but she says she has hot flashes in the summer so she cannot wear them in the summer. No further pedal complaints today.       /70 (BP Location: Left arm, Patient Position: Sitting, Cuff Size: Adult Regular)   Pulse 69   Temp 98  F (36.7  C) (Tympanic)   SpO2 100%     Patient Active Problem List   Diagnosis     Multiple respiratory allergies     ACP (advance care planning)       Past Surgical History:   Procedure Laterality Date     ABDOMEN SURGERY       BREAST SURGERY  3/2007     BUNIONECTOMY Right 10/22/2018    Procedure: RIGHT FOOT 1ST METATARSAL DISTAL CHEVERON OSTEOTOMY AND LATERAL SOFT FISSUE RELEASE;  Surgeon: Netta Soni DPM;  Location: [facility]     BUNIONECTOMY RT/LT  2008     COLONOSCOPY N/A 10/30/2017    Procedure: COLONOSCOPY;  WHOLE COLON COLONOSCOPY ;  Surgeon: Erma Jenkins MD;  Location: [facility]     ENT SURGERY  10/2013    nose surgery, Dr. Elliott     EXCISE MASS LOWER EXTREMITY Right 2017    Procedure: EXCISE MASS LOWER EXTREMITY;  EXCISION SOFT TISSUE MASS RIGHT LOWER LEG;  Surgeon: Erma Jenkins MD;  Location: [facility]     EYE SURGERY  2018    Lasik surgery             GYN SURGERY  2011    partial hysterectomy     laparoscopic supracervical hysterectomy  2011    Fibroids, ovaries intact     Left Breast Bx  3/2007    Fibrocystic breast disease     PE tube placement       ventilation tube, left  2011       Current Outpatient Medications   Medication     estradiol (ESTRACE) 0.5 MG tablet     ALBUTEROL 108 (90 BASE) MCG/ACT inhaler     No current facility-administered medications for this visit.           Allergies   Allergen Reactions     Seasonal Allergies        Family History   Problem Relation Age of Onset     C.A.D. Father      Other Cancer Other         pancreatic cancer     Other Cancer Cousin         Pancreatic cancer     Thyroid Disease Paternal Grandmother      Diabetes No family hx of      Cerebrovascular Disease No family hx of      Other Cancer No family hx of        Social History     Socioeconomic History     Marital status:      Spouse name: Not on file     Number of children: Not on file     Years of education: Not on file     Highest education level: Not on file   Social Needs     Financial resource strain: Not on file     Food insecurity - worry: Not on file     Food insecurity - inability: Not on file     Transportation needs - medical: Not on file     Transportation needs - non-medical: Not on file   Occupational History     Occupation: cosmotologist     Comment: Full-time   Tobacco Use     Smoking status: Never Smoker     Smokeless tobacco: Never Used     Tobacco comment: no passive exposure   Substance and Sexual Activity     Alcohol use: Yes     Comment: rarely     Drug use: No     Sexual activity: Not on file   Other Topics Concern      Service Not Asked     Blood Transfusions Not Asked     Caffeine Concern Yes     Comment: coffee, 1 cup daily     Occupational Exposure Not Asked     Hobby Hazards Not Asked     Sleep Concern Not Asked     Stress Concern Not Asked     Weight Concern Not Asked     Special Diet Not Asked     Back Care Not Asked      Exercise Not Asked     Bike Helmet Not Asked     Seat Belt Not Asked     Self-Exams Not Asked     Parent/sibling w/ CABG, MI or angioplasty before 65F 55M? No   Social History Narrative     Not on file       ROS: 10 point ROS neg other than the symptoms noted above in the HPI.  EXAM  Constitutional: healthy, alert and no distress      Psychiatric: mentation appears normal and affect normal/bright      VASCULAR:  -Dorsalis pedis pulse +2/4  -Posterior tibial pulse +2/4  -Telangiectasias and varicosities present to ankle and posterior leg bilaterally   -Mild non-pitting edema to b/l legs and mild non-pitting edema to RIGHT foot    NEURO:  -Light touch sensation intact to b/l plantar forefoot     DERM:  -Cicatrix to RIGHT ad LEFT dorsal 1st ray   -Skin temperature, texture and turgor WNL   -Toenails normotrophic x 10 and covered with toenail polish    MSK:  -Incision site is mildly numb with palpation--par with post-operative site  -Current inserts fit well with arch of patient's foot, but the RIGHT 3/4 length insert broke I half      RADIOGRAPHS RIGHT FOOT 08/29/2018      FINDINGS: There is a mild hallux valgus deformity. There is no  significant degenerative change in the first metatarsal phalangeal  joint.      No fracture or dislocation is seen. There is no significant  degenerative change.          IMPRESSION: Mild hallux valgus deformity.      LISSETTE HAHN MD      RADIOGRAPHS LEFT FOOT 08/29/2018  FINDINGS: There has been previous bunion surgery with postsurgical  changes in the first metatarsal. There is no significant hallux valgus  deformity. There is mild degenerative change of the first metatarsal  phalangeal joint.      No fracture, dislocation or other significant degenerative change is  seen.          IMPRESSION: Postsurgical changes in the first metatarsal.      LISSETTE HAHN MD     X-RAY RIGHT FOOT 11/07/2018  FINDINGS:  The osteotomy has been performed in the first metatarsal  fixed with 2  screws. No change in appearance of the fifth metatarsal  is seen as compared to previous examination. The remainder the foot  appears intact.           IMPRESSION: Stable appearance of the foot as compared October 25, 2018          ABEL PEREIRA MD    RADIOGRAPHS RIGHT FOOT ([facility]) 12/21/2018)  FINDINGS: There has been previous bunion surgery. 2 screws are seen in  the distal first metatarsal and there has been resection of the medial  head. Osteotomy appears to be healing.     No new abnormality is seen.                                                                        IMPRESSION: Stable postsurgical changes.     LISSETTE HAHN MD  ============================================================      ASSESSMENT:      (M79.671) Foot pain, right      (I83.93) Varicose veins of both lower extremities        PLAN:  -Patient evaluated and examined. Treatment options discussed with no educational barriers noted.  -Reviewed today's radiographs with patient -- her osteotomy site continues to heal  -Continue Cica Care and compression socks.   -Patient advised to wear a toe spacer on her surgical foot between her hallux and 2nd digit to maintain correction.  -Patient's bunion could potentially recur in the future. If she notices it starting to return, she should follow-up with podiatry for insert and/or spinting options. She has CMOs that fit well to her foot (made by Dr. Chi's office 2+ years ago). Continue wearing CMOs for correction of biomechanics.  -All of patients questions were answered and she was in agreement with the above treatment plan        RTC as needed    Netta Soni DPM

## 2019-07-01 ENCOUNTER — ANCILLARY PROCEDURE (OUTPATIENT)
Dept: MAMMOGRAPHY | Facility: OTHER | Age: 54
End: 2019-07-01
Attending: FAMILY MEDICINE
Payer: COMMERCIAL

## 2019-07-01 DIAGNOSIS — Z12.31 ENCOUNTER FOR SCREENING MAMMOGRAM FOR BREAST CANCER: ICD-10-CM

## 2019-07-01 PROCEDURE — 77063 BREAST TOMOSYNTHESIS BI: CPT | Mod: TC

## 2019-07-01 PROCEDURE — 77067 SCR MAMMO BI INCL CAD: CPT | Mod: TC

## 2019-07-01 NOTE — PROGRESS NOTES
SUBJECTIVE:   CC: Luly Freeman is an 54 year old woman who presents for preventive health visit.     Healthy Habits:    Do you get at least three servings of calcium containing foods daily (dairy, green leafy vegetables, etc.)? yes    Amount of exercise or daily activities, outside of work: none    Problems taking medications regularly No    Medication side effects: No    Have you had an eye exam in the past two years? yes    Do you see a dentist twice per year? yes    Do you have sleep apnea, excessive snoring or daytime drowsiness?no    Fatigue   Tired occasionally. Sleeping pretty well most of the time. Hypothyroidism runs in the family    Nose lesion  Lesion on left side of nose, present for a couple of months, becomes scaly and sometimes peels off.     Today's PHQ-2 Score:   PHQ-2 ( 1999 [employer]) 4/15/2019 3/15/2019   Q1: Little interest or pleasure in doing things 0 0   Q2: Feeling down, depressed or hopeless 0 0   PHQ-2 Score 0 0       Abuse: Current or Past(Physical, Sexual or Emotional)- No  Do you feel safe in your environment? Yes    Social History     Tobacco Use     Smoking status: Never Smoker     Smokeless tobacco: Never Used     Tobacco comment: no passive exposure   Substance Use Topics     Alcohol use: Yes     Comment: occasionally     If you drink alcohol do you typically have >3 drinks per day or >7 drinks per week? No                     Reviewed orders with patient.  Reviewed health maintenance and updated orders accordingly - Yes  BP Readings from Last 3 Encounters:   07/08/19 104/60   05/03/19 110/70   04/15/19 98/70    Wt Readings from Last 3 Encounters:   07/08/19 78 kg (172 lb)   04/15/19 75.3 kg (166 lb)   03/15/19 74.4 kg (164 lb)                  Patient Active Problem List   Diagnosis     Multiple respiratory allergies     ACP (advance care planning)     Past Surgical History:   Procedure Laterality Date     ABDOMEN SURGERY  2011     BREAST SURGERY  3/2007     BUNIONECTOMY Right  10/22/2018    Procedure: RIGHT FOOT 1ST METATARSAL DISTAL CHEVERON OSTEOTOMY AND LATERAL SOFT FISSUE RELEASE;  Surgeon: Netta Soni DPM;  [facility]     BUNIONECTOMY RT/LT  2008     COLONOSCOPY N/A 10/30/2017    Procedure: COLONOSCOPY;  WHOLE COLON COLONOSCOPY ;  Surgeon: Erma Jenkins MD;  [facility]: [facility]     ENT SURGERY  10/2013    nose surgery, Dr. Elliott     EXCISE MASS LOWER EXTREMITY Right 2017    Procedure: EXCISE MASS LOWER EXTREMITY;  EXCISION SOFT TISSUE MASS RIGHT LOWER LEG;  Surgeon: Erma Jenkins MD;  Location: [facility]     EYE SURGERY  2018    Lasik surgery            GYN SURGERY      partial hysterectomy     laparoscopic supracervical hysterectomy      Fibroids, ovaries intact     Left Breast Bx  3/2007    Fibrocystic breast disease     PE tube placement       ventilation tube, left         Social History     Tobacco Use     Smoking status: Never Smoker     Smokeless tobacco: Never Used     Tobacco comment: no passive exposure   Substance Use Topics     Alcohol use: Yes     Comment: occasionally     Family History   Problem Relation Age of Onset     C.A.D. Father      Other Cancer Other         pancreatic cancer     Other Cancer Cousin         Pancreatic cancer     Thyroid Disease Paternal Grandmother      Diabetes No family hx of      Cerebrovascular Disease No family hx of      Other Cancer No family hx of          Current Outpatient Medications   Medication Sig Dispense Refill     ALBUTEROL 108 (90 BASE) MCG/ACT inhaler INHALE 2 PUFFS BY MOUTH EVERY 6 HOURS AS NEEDED FOR SHORTNESS OF BREATH 8.5 g 0     estradiol (ESTRACE) 0.5 MG tablet Take 1 tablet (0.5 mg) by mouth daily 90 tablet 3     Allergies   Allergen Reactions     Seasonal Allergies        Mammogram done, negative    Pertinent mammograms are reviewed under the imaging tab.  History of abnormal Pap smear: Status post hysterectomy. Pap still indicated. Supracervical hysterectomy.     PAP /  HPV Latest Ref Rng & Units 2017   PAP - NIL NIL   HPV 16 DNA NEG Negative -   HPV 18 DNA NEG Negative -   OTHER HR HPV NEG Negative -     Reviewed and updated as needed this visit by clinical staff         Reviewed and updated as needed this visit by Provider        Past Medical History:   Diagnosis Date     Allergic rhinitis 2011     Depressive disorder  ??     Lump or mass in breast 2007     PONV (postoperative nausea and vomiting)      Recurrent sinusitis 2011     Varicose veins 2011      Past Surgical History:   Procedure Laterality Date     ABDOMEN SURGERY  2011     BREAST SURGERY  3/2007     BUNIONECTOMY Right 10/22/2018    Procedure: RIGHT FOOT 1ST METATARSAL DISTAL CHEVERON OSTEOTOMY AND LATERAL SOFT FISSUE RELEASE;  Surgeon: Netta Soni DPM;  [facility]     BUNIONECTOMY RT/LT  2008     COLONOSCOPY N/A 10/30/2017    Procedure: COLONOSCOPY;  WHOLE COLON COLONOSCOPY ;  Surgeon: Erma Jenkins MD;  [facility]     ENT SURGERY  10/2013    nose surgery, Dr. Elliott     EXCISE MASS LOWER EXTREMITY Right 2017    Procedure: EXCISE MASS LOWER EXTREMITY;  EXCISION SOFT TISSUE MASS RIGHT LOWER LEG;  Surgeon: Erma Jenkins MD;  [facility]     EYE SURGERY  2018    Lasik surgery            GYN SURGERY      partial hysterectomy     laparoscopic supracervical hysterectomy      Fibroids, ovaries intact     Left Breast Bx  3/2007    Fibrocystic breast disease     PE tube placement       ventilation tube, left       OB History   No data available       ROS:  CONSTITUTIONAL: NEGATIVE for fever, chills, change in weight  INTEGUMENTARY/SKIN: NEGATIVE for worrisome rashes, moles or lesions  EYES: NEGATIVE for vision changes or irritation  ENT: NEGATIVE for ear, mouth and throat problems  RESP: NEGATIVE for significant cough or SOB  BREAST: NEGATIVE for masses, tenderness or discharge  CV: NEGATIVE for chest pain, palpitations  "or peripheral edema  GI: NEGATIVE for nausea, abdominal pain, heartburn, or change in bowel habits  : NEGATIVE for unusual urinary or vaginal symptoms. No vaginal bleeding.  MUSCULOSKELETAL: NEGATIVE for significant arthralgias or myalgia  NEURO: NEGATIVE for weakness, dizziness or paresthesias  ENDOCRINE: NEGATIVE for temperature intolerance, skin/hair changes  HEME/ALLERGY/IMMUNE: NEGATIVE for bleeding problems  PSYCHIATRIC: NEGATIVE for changes in mood or affect     She has weaned off off hormones and is doing well with minimal hot flashes    OBJECTIVE:   /60   Pulse 64   Resp 19   Ht 1.727 m (5' 8\")   Wt 78 kg (172 lb)   BMI 26.15 kg/m    EXAM:  GENERAL APPEARANCE: healthy, alert and no distress  EYES: Eyes grossly normal to inspection, PERRL and conjunctivae and sclerae normal  HENT: ear canals and TM's normal, nose and mouth without ulcers or lesions, oropharynx clear and oral mucous membranes moist  NECK: no adenopathy, no asymmetry, masses, or scars and thyroid normal to palpation  RESP: lungs clear to auscultation - no rales, rhonchi or wheezes  BREAST: normal without masses, tenderness or nipple discharge and no palpable axillary masses or adenopathy  CV: regular rate and rhythm, normal S1 S2, no S3 or S4, no murmur, click or rub, no peripheral edema and peripheral pulses strong  ABDOMEN: soft, nontender, no hepatosplenomegaly, no masses and bowel sounds normal  MS: no musculoskeletal defects are noted and gait is age appropriate without ataxia  SKIN: 4 mm slightly erythematous area of the left nose noted. Scattered benign moles of the trunk  NEURO: Normal strength and tone, sensory exam grossly normal, mentation intact and speech normal  PSYCH: mentation appears normal and affect normal/bright        ASSESSMENT/PLAN:   1. Routine general medical examination at a health care facility  Mammogram done. Pap negative 2017, no history of abnormal pap.     2. Fatigue, unspecified type  Check TSH  - " "TSH with free T4 reflex    3. Screening for lipoid disorders  Obtain labs today  - Lipid Profile    4. Encounter for screening examination for impaired glucose regulation and diabetes mellitus  Check glucose today  - Glucose    5. Lesion of nose  Discussed trying Effudex, however will refer to Dr Yancey for evalatuion  - DERMATOLOGY REFERRAL    6. Immunization due  shingrix is given today      COUNSELING:   Reviewed preventive health counseling, as reflected in patient instructions       Regular exercise       Healthy diet/nutrition    Estimated body mass index is 25.24 kg/m  as calculated from the following:    Height as of 3/15/19: 1.727 m (5' 8\").    Weight as of 4/15/19: 75.3 kg (166 lb).    Weight management plan: Discussed healthy diet and exercise guidelines     reports that she has never smoked. She has never used smokeless tobacco.      Counseling Resources:  ATP IV Guidelines  Pooled Cohorts Equation Calculator  Breast Cancer Risk Calculator  FRAX Risk Assessment  ICSI Preventive Guidelines  Dietary Guidelines for Americans, 2010  USDA's MyPlate  ASA Prophylaxis  Lung CA Screening    Tere Herrera MD  [facility] [place]  "

## 2019-07-08 ENCOUNTER — OFFICE VISIT (OUTPATIENT)
Dept: FAMILY MEDICINE | Facility: OTHER | Age: 54
End: 2019-07-08
Attending: FAMILY MEDICINE
Payer: COMMERCIAL

## 2019-07-08 VITALS
DIASTOLIC BLOOD PRESSURE: 60 MMHG | WEIGHT: 172 LBS | BODY MASS INDEX: 26.07 KG/M2 | RESPIRATION RATE: 19 BRPM | SYSTOLIC BLOOD PRESSURE: 104 MMHG | HEIGHT: 68 IN | HEART RATE: 64 BPM

## 2019-07-08 DIAGNOSIS — J34.89 LESION OF NOSE: ICD-10-CM

## 2019-07-08 DIAGNOSIS — Z13.220 SCREENING FOR LIPOID DISORDERS: ICD-10-CM

## 2019-07-08 DIAGNOSIS — R53.83 FATIGUE, UNSPECIFIED TYPE: ICD-10-CM

## 2019-07-08 DIAGNOSIS — Z00.00 ROUTINE GENERAL MEDICAL EXAMINATION AT A HEALTH CARE FACILITY: Primary | ICD-10-CM

## 2019-07-08 DIAGNOSIS — Z23 IMMUNIZATION DUE: ICD-10-CM

## 2019-07-08 DIAGNOSIS — Z13.1 ENCOUNTER FOR SCREENING EXAMINATION FOR IMPAIRED GLUCOSE REGULATION AND DIABETES MELLITUS: ICD-10-CM

## 2019-07-08 LAB
CHOLEST SERPL-MCNC: 162 MG/DL
GLUCOSE SERPL-MCNC: 96 MG/DL (ref 70–99)
HDLC SERPL-MCNC: 93 MG/DL
LDLC SERPL CALC-MCNC: 56 MG/DL
NONHDLC SERPL-MCNC: 69 MG/DL
TRIGL SERPL-MCNC: 63 MG/DL
TSH SERPL DL<=0.005 MIU/L-ACNC: 1.02 MU/L (ref 0.4–4)

## 2019-07-08 PROCEDURE — 84443 ASSAY THYROID STIM HORMONE: CPT | Performed by: FAMILY MEDICINE

## 2019-07-08 PROCEDURE — 80061 LIPID PANEL: CPT | Performed by: FAMILY MEDICINE

## 2019-07-08 PROCEDURE — 36415 COLL VENOUS BLD VENIPUNCTURE: CPT | Performed by: FAMILY MEDICINE

## 2019-07-08 PROCEDURE — 90750 HZV VACC RECOMBINANT IM: CPT | Performed by: FAMILY MEDICINE

## 2019-07-08 PROCEDURE — 82947 ASSAY GLUCOSE BLOOD QUANT: CPT | Performed by: FAMILY MEDICINE

## 2019-07-08 PROCEDURE — 99396 PREV VISIT EST AGE 40-64: CPT | Mod: 25 | Performed by: FAMILY MEDICINE

## 2019-07-08 PROCEDURE — 90471 IMMUNIZATION ADMIN: CPT | Performed by: FAMILY MEDICINE

## 2019-07-08 ASSESSMENT — MIFFLIN-ST. JEOR: SCORE: 1438.69

## 2019-07-08 ASSESSMENT — PAIN SCALES - GENERAL: PAINLEVEL: NO PAIN (0)

## 2019-07-08 NOTE — LETTER
July 8, 2019      Luly Freeman  [address]  [place] [address]        Dear ,    We are writing to inform you of your test results.    Your test results fall within the expected range(s) or remain unchanged from previous results.  Please continue with current treatment plan.    Resulted Orders   TSH with free T4 reflex   Result Value Ref Range    TSH 1.02 0.40 - 4.00 mU/L   Lipid Profile   Result Value Ref Range    Cholesterol 162 <200 mg/dL    Triglycerides 63 <150 mg/dL    HDL Cholesterol 93 >49 mg/dL    LDL Cholesterol Calculated 56 <100 mg/dL      Comment:      Desirable:       <100 mg/dl    Non HDL Cholesterol 69 <130 mg/dL   Glucose   Result Value Ref Range    Glucose 96 70 - 99 mg/dL       If you have any questions or concerns, please call the clinic at the number listed above.       Sincerely,        Tere Herrera MD

## 2019-07-08 NOTE — NURSING NOTE
"Chief Complaint   Patient presents with     Physical       Initial /60   Pulse 64   Resp 19   Ht 1.727 m (5' 8\")   Wt 78 kg (172 lb)   BMI 26.15 kg/m   Estimated body mass index is 26.15 kg/m  as calculated from the following:    Height as of this encounter: 1.727 m (5' 8\").    Weight as of this encounter: 78 kg (172 lb).  Medication Reconciliation: complete    "

## 2019-10-03 DIAGNOSIS — N95.1 MENOPAUSAL SYNDROME (HOT FLASHES): ICD-10-CM

## 2019-10-04 RX ORDER — ONDANSETRON 4 MG/1
TABLET, FILM COATED ORAL
Qty: 15 TABLET | Refills: 0 | OUTPATIENT
Start: 2019-10-04

## 2019-10-07 RX ORDER — ESTRADIOL 0.5 MG/1
TABLET ORAL
Qty: 90 TABLET | Refills: 0 | Status: SHIPPED | OUTPATIENT
Start: 2019-10-07 | End: 2020-01-09

## 2019-10-07 NOTE — TELEPHONE ENCOUNTER
estradiol (ESTRACE) 0.5 MG tablet      Last Written Prescription Date:  4-15-19  Last Fill Quantity: 90,   # refills: 3  Last Office Visit: 7-8-19  Future Office visit:       Signed as historical. Please advise. Thank you.

## 2019-10-11 ENCOUNTER — ALLIED HEALTH/NURSE VISIT (OUTPATIENT)
Dept: ALLERGY | Facility: OTHER | Age: 54
End: 2019-10-11
Attending: FAMILY MEDICINE
Payer: COMMERCIAL

## 2019-10-11 DIAGNOSIS — Z23 ENCOUNTER FOR IMMUNIZATION: Primary | ICD-10-CM

## 2019-10-11 PROCEDURE — 90750 HZV VACC RECOMBINANT IM: CPT

## 2019-10-11 PROCEDURE — 90471 IMMUNIZATION ADMIN: CPT

## 2019-10-25 NOTE — ANESTHESIA PREPROCEDURE EVALUATION
Anesthesia Evaluation     . Pt has had prior anesthetic. Type: General    History of anesthetic complications   - PONV        ROS/MED HX    ENT/Pulmonary:  - neg pulmonary ROS     Neurologic:  - neg neurologic ROS     Cardiovascular:  - neg cardiovascular ROS       METS/Exercise Tolerance:     Hematologic:  - neg hematologic  ROS       Musculoskeletal:   (+) arthritis, , , -       GI/Hepatic:  - neg GI/hepatic ROS       Renal/Genitourinary:  - ROS Renal section negative       Endo:  - neg endo ROS       Psychiatric:  - neg psychiatric ROS       Infectious Disease:  - neg infectious disease ROS       Malignancy:      - no malignancy   Other:    - neg other ROS                 Physical Exam  Normal systems: pulmonary and dental    Airway   Mallampati: II  TM distance: >3 FB  Neck ROM: full    Dental     Cardiovascular   Rhythm and rate: regular and normal      Pulmonary    breath sounds clear to auscultation                    Anesthesia Plan      History & Physical Review  History and physical reviewed and following examination; no interval change.    ASA Status:  2 .    NPO Status:  > 8 hours    Plan for MAC with Intravenous induction. Reason for MAC:  Difficulty with conscious sedation (QS)  PONV prophylaxis:  Ondansetron (or other 5HT-3)       Postoperative Care      Consents  Anesthetic plan, risks, benefits and alternatives discussed with:  Patient..                          .   Reason for Call:  Pre-Op Approval    Detailed comments: Patients wife, Huyen, called asking if Dr. Pedraza will approve patients last pre-op physical for his upcoming colonoscopy on 10/30.  Wife asked that I specifically send Dr. Pedraza the message as she knows the patient very well and his recent history.  If approved, we can fax pre-op to [phone].  Please call patient or patient wife back to advise.  (We have permission per Snapshot to speak with Huyen).    Phone Number Patient can be reached at: Home number on file [phone] (home)    Best Time: Any    Can we leave a detailed message on this number? YES    Call taken on 10/25/2019 at 9:16 AM by Mary Burns  3

## 2019-12-04 ENCOUNTER — OFFICE VISIT (OUTPATIENT)
Dept: DERMATOLOGY | Facility: OTHER | Age: 54
End: 2019-12-04
Attending: FAMILY MEDICINE
Payer: COMMERCIAL

## 2019-12-04 VITALS
DIASTOLIC BLOOD PRESSURE: 71 MMHG | HEIGHT: 68 IN | WEIGHT: 170 LBS | HEART RATE: 90 BPM | BODY MASS INDEX: 25.76 KG/M2 | OXYGEN SATURATION: 98 % | SYSTOLIC BLOOD PRESSURE: 116 MMHG

## 2019-12-04 DIAGNOSIS — L57.0 ACTINIC KERATOSIS: Primary | ICD-10-CM

## 2019-12-04 DIAGNOSIS — J34.89 LESION OF NOSE: ICD-10-CM

## 2019-12-04 PROCEDURE — 99202 OFFICE O/P NEW SF 15 MIN: CPT | Performed by: DERMATOLOGY

## 2019-12-04 RX ORDER — FLUOROURACIL 50 MG/G
CREAM TOPICAL
Qty: 40 G | Refills: 1 | Status: SHIPPED | OUTPATIENT
Start: 2019-12-04 | End: 2020-01-03

## 2019-12-04 ASSESSMENT — PAIN SCALES - GENERAL: PAINLEVEL: NO PAIN (0)

## 2019-12-04 ASSESSMENT — MIFFLIN-ST. JEOR: SCORE: 1429.61

## 2019-12-04 NOTE — PATIENT INSTRUCTIONS
The lesions on your face are likely actinic keratoses - very early sun damage spots. We will treat them with an anti-cancer skin cream called Efudex. It will cause redness and irritation - use once a day for two weeks.  Call if questions.

## 2019-12-04 NOTE — LETTER
12/4/2019       RE: Luly Freeman  [address]  [place] [address]     Dear Colleague,    Thank you for referring your patient, Luly Freeman, to the [facility]. Please see a copy of my visit note below.    Dictated     Again, thank you for allowing me to participate in the care of your patient.      Sincerely,    TIMOTHY Yancey MD

## 2019-12-04 NOTE — NURSING NOTE
"Chief Complaint   Patient presents with     Derm Problem     lesion on nose       Initial /71 (BP Location: Left arm, Cuff Size: Adult Regular)   Pulse 90   Ht 1.727 m (5' 8\")   Wt 77.1 kg (170 lb)   SpO2 98%   BMI 25.85 kg/m   Estimated body mass index is 25.85 kg/m  as calculated from the following:    Height as of this encounter: 1.727 m (5' 8\").    Weight as of this encounter: 77.1 kg (170 lb).  Medication Reconciliation: complete  Asuncion Wells LPN  "

## 2019-12-04 NOTE — PROGRESS NOTES
Visit Date:   2019      S:  First visit for Luly, who has developed some lesions on the tip and side of the nose and on the  left sideburn area in the last month or two.  These lesions are pink, slightly scaly and persistent.  She works as a  and is familiar with some facial skin lesions and problems.        O:  Shows a healthy lady in no distress.  The lesions are very minimal, but do suggest early actinic keratoses because of their persistence and location and red color.   She is a very active outdoor  person.  She and her  motorcycle, she was a  and she did lot of gardening work in her youth without much sun protection.      P:  5% 5-FU applied daily for 14 days to these lesions.  Advised that this could be an expensive topical. If so, I asked her to call me and I can tell her how to obtain it less expensively.  I chose 5FU because it eradicates actinic keratoses but does not leave hypopigmentation which is a risk with cryotherapy.      Medications and allergies reviewed.     I did look also examine her back and  it showed some scattered nevi, some cherry angiomas, but no evidence of any sun damage or unusual lesions.  We discussed what melanoma looks like.  Her  has had some apparent facial skin cancers, probably basal cells.  We discussed those as well.  Return on a p.r.n. basis because of the minimal lesions today.         TIMOTHY CHAMPAGNE MD             D: 2019   T: 2019   MT: NTS      Name:     LULY RIVERA   MRN:      [id number]        Account:      [id number]   :      1965           Visit Date:   2019      Document: [id number]       cc: Tere Herrera MD

## 2020-01-03 ENCOUNTER — HOSPITAL ENCOUNTER (EMERGENCY)
Facility: HOSPITAL | Age: 55
Discharge: HOME | End: 2020-01-03
Attending: NURSE PRACTITIONER | Admitting: NURSE PRACTITIONER
Payer: COMMERCIAL

## 2020-01-03 VITALS
DIASTOLIC BLOOD PRESSURE: 71 MMHG | TEMPERATURE: 97.5 F | SYSTOLIC BLOOD PRESSURE: 118 MMHG | OXYGEN SATURATION: 100 % | RESPIRATION RATE: 16 BRPM

## 2020-01-03 DIAGNOSIS — M79.631 PAIN OF RIGHT FOREARM: Primary | ICD-10-CM

## 2020-01-03 PROCEDURE — G0463 HOSPITAL OUTPT CLINIC VISIT: HCPCS

## 2020-01-03 PROCEDURE — 99213 OFFICE O/P EST LOW 20 MIN: CPT | Mod: Z6 | Performed by: NURSE PRACTITIONER

## 2020-01-03 ASSESSMENT — ENCOUNTER SYMPTOMS
MYALGIAS: 1
JOINT SWELLING: 1

## 2020-01-03 NOTE — ED AVS SNAPSHOT
[facility]  [address] [address]  Phone:  [phone]                                    Luly Freeman   MRN: [id number]    Department:  [facility]   Date of Visit:  1/3/2020           After Visit Summary Signature Page    I have received my discharge instructions, and my questions have been answered. I have discussed any challenges I see with this plan with the nurse or doctor.    ..........................................................................................................................................  Patient/Patient Representative Signature      ..........................................................................................................................................  Patient Representative Print Name and Relationship to Patient    ..................................................               ................................................  Date                                   Time    ..........................................................................................................................................  Reviewed by Signature/Title    ...................................................              ..............................................  Date                                               Time          22EPIC Rev 08/18

## 2020-01-03 NOTE — DISCHARGE INSTRUCTIONS
Apply ice and take ibuprofen as needed for the pain.  Can use the brace as needed and continue using arm as tolerated.    Schedule follow-up appointment with your doctor in 1 week if no improvement in symptoms.    Return to emergency department for worsening or concerning symptoms.
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
## 2020-01-09 DIAGNOSIS — N95.1 MENOPAUSAL SYNDROME (HOT FLASHES): ICD-10-CM

## 2020-01-09 NOTE — TELEPHONE ENCOUNTER
estradiol (ESTRACE) 0.5 MG tablet      Last Written Prescription Date:  10/7/2019  Last Fill Quantity: 90,   # refills: 0  Last Office Visit: 07/08/2019  Future Office visit:

## 2020-01-13 ENCOUNTER — E-VISIT (OUTPATIENT)
Dept: FAMILY MEDICINE | Facility: OTHER | Age: 55
End: 2020-01-13
Payer: COMMERCIAL

## 2020-01-13 ENCOUNTER — OFFICE VISIT (OUTPATIENT)
Dept: FAMILY MEDICINE | Facility: OTHER | Age: 55
End: 2020-01-13
Attending: FAMILY MEDICINE
Payer: COMMERCIAL

## 2020-01-13 VITALS
DIASTOLIC BLOOD PRESSURE: 67 MMHG | WEIGHT: 170 LBS | RESPIRATION RATE: 18 BRPM | BODY MASS INDEX: 25.76 KG/M2 | OXYGEN SATURATION: 99 % | HEART RATE: 97 BPM | TEMPERATURE: 97.9 F | SYSTOLIC BLOOD PRESSURE: 102 MMHG | HEIGHT: 68 IN

## 2020-01-13 DIAGNOSIS — J06.9 URI (UPPER RESPIRATORY INFECTION): Primary | ICD-10-CM

## 2020-01-13 DIAGNOSIS — J01.10 ACUTE NON-RECURRENT FRONTAL SINUSITIS: Primary | ICD-10-CM

## 2020-01-13 DIAGNOSIS — J02.9 SORE THROAT: ICD-10-CM

## 2020-01-13 LAB
DEPRECATED S PYO AG THROAT QL EIA: NORMAL
SPECIMEN SOURCE: NORMAL

## 2020-01-13 PROCEDURE — 99213 OFFICE O/P EST LOW 20 MIN: CPT | Performed by: FAMILY MEDICINE

## 2020-01-13 PROCEDURE — 87081 CULTURE SCREEN ONLY: CPT | Performed by: FAMILY MEDICINE

## 2020-01-13 PROCEDURE — 87880 STREP A ASSAY W/OPTIC: CPT | Performed by: FAMILY MEDICINE

## 2020-01-13 RX ORDER — ESTRADIOL 0.5 MG/1
TABLET ORAL
Qty: 90 TABLET | Refills: 0 | Status: SHIPPED | OUTPATIENT
Start: 2020-01-13 | End: 2020-04-09

## 2020-01-13 RX ORDER — CEFPROZIL 500 MG/1
500 TABLET, FILM COATED ORAL 2 TIMES DAILY
Qty: 20 TABLET | Refills: 0 | Status: SHIPPED | OUTPATIENT
Start: 2020-01-13 | End: 2020-02-17

## 2020-01-13 ASSESSMENT — PAIN SCALES - GENERAL: PAINLEVEL: NO PAIN (0)

## 2020-01-13 ASSESSMENT — MIFFLIN-ST. JEOR: SCORE: 1429.61

## 2020-01-13 NOTE — NURSING NOTE
"Chief Complaint   Patient presents with     URI       Initial /67 (BP Location: Left arm, Patient Position: Sitting, Cuff Size: Adult Regular)   Pulse 97   Temp 97.9  F (36.6  C) (Tympanic)   Resp 18   Ht 1.727 m (5' 8\")   Wt 77.1 kg (170 lb)   SpO2 99%   BMI 25.85 kg/m   Estimated body mass index is 25.85 kg/m  as calculated from the following:    Height as of this encounter: 1.727 m (5' 8\").    Weight as of this encounter: 77.1 kg (170 lb).  Medication Reconciliation: complete  Maureen Caldwell LPN  "

## 2020-01-13 NOTE — PROGRESS NOTES
Subjective     Luly Freeman is a 54 year old female who presents to clinic today for the following health issues:    HPI   RESPIRATORY SYMPTOMS      Duration: 1 week    Description  nasal congestion, rhinorrhea, sore throat, facial pain/pressure, cough, fever, chills, ear pain bilateral, headache, fatigue/malaise and hoarse voice    Severity: moderate    Accompanying signs and symptoms: see above    History (predisposing factors):  none    Precipitating or alleviating factors: None    Therapies tried and outcome:  rest and fluids oral decongestant advil nasal spray/wash - not effective  Symptoms started 2020 when she was visiting [place] with the sudden on set of fever, fatigue, aches and a cough. She had had sinus pressure two weeks prior to that. She was seen in an Urgent Care, told she had influenza but wasn't tested for that or strep. She was told she was out of the timeline to receive medication though it was 48 hours so she wasn't treated. Her original symptoms have resolved but now she has the above sinus symptoms      Patient Active Problem List   Diagnosis     Multiple respiratory allergies     ACP (advance care planning)     Past Surgical History:   Procedure Laterality Date     ABDOMEN SURGERY       BREAST SURGERY  3/2007     BUNIONECTOMY Right 10/22/2018    Procedure: RIGHT FOOT 1ST METATARSAL DISTAL CHEVERON OSTEOTOMY AND LATERAL SOFT FISSUE RELEASE;  Surgeon: Netta Soni DPM;  [facility]     BUNIONECTOMY RT/LT  2008     COLONOSCOPY N/A 10/30/2017    Procedure: COLONOSCOPY;  WHOLE COLON COLONOSCOPY ;  Surgeon: Erma Jenkins MD;  [facility]: [facility]     ENT SURGERY  10/2013    nose surgery, Dr. Elliott     EXCISE MASS LOWER EXTREMITY Right 2017    Procedure: EXCISE MASS LOWER EXTREMITY;  EXCISION SOFT TISSUE MASS RIGHT LOWER LEG;  Surgeon: Erma Jenkins MD;  [facility]: [facility]     EYE SURGERY  2018    Lasik surgery            GYN SURGERY       "partial hysterectomy     laparoscopic supracervical hysterectomy  2011    Fibroids, ovaries intact     Left Breast Bx  3/2007    Fibrocystic breast disease     PE tube placement       ventilation tube, left  2011       Social History     Tobacco Use     Smoking status: Never Smoker     Smokeless tobacco: Never Used     Tobacco comment: no passive exposure   Substance Use Topics     Alcohol use: Yes     Comment: occasionally     Family History   Problem Relation Age of Onset     C.A.D. Father      Other Cancer Other         pancreatic cancer     Other Cancer Cousin         Pancreatic cancer     Thyroid Disease Paternal Grandmother      Diabetes No family hx of      Cerebrovascular Disease No family hx of      Other Cancer No family hx of          Current Outpatient Medications   Medication Sig Dispense Refill     cefPROZIL (CEFZIL) 500 MG tablet Take 1 tablet (500 mg) by mouth 2 times daily for 10 days 20 tablet 0     estradiol (ESTRACE) 0.5 MG tablet TAKE 1 TABLET(0.5 MG) BY MOUTH DAILY 90 tablet 0     Allergies   Allergen Reactions     Seasonal Allergies      BP Readings from Last 3 Encounters:   01/13/20 102/67   01/03/20 118/71   12/04/19 116/71    Wt Readings from Last 3 Encounters:   01/13/20 77.1 kg (170 lb)   12/04/19 77.1 kg (170 lb)   07/08/19 78 kg (172 lb)                      Reviewed and updated as needed this visit by Provider         Review of Systems   ROS COMP: Constitutional, HEENT, cardiovascular, pulmonary, gi and gu systems are negative, except as otherwise noted.      Objective    Pulse 97   Temp 97.9  F (36.6  C) (Tympanic)   Resp 18   Ht 1.727 m (5' 8\")   Wt 77.1 kg (170 lb)   SpO2 99%   BMI 25.85 kg/m    Body mass index is 25.85 kg/m .  Physical Exam   GENERAL: congested, fatigued,  alert and no distress  EYES: Eyes grossly normal to inspection, PERRL and conjunctivae and sclerae normal  HENT: ear canals and TM's normal, nose and mouth without ulcers or lesions  NECK: no adenopathy, no " "asymmetry, masses, or scars and thyroid normal to palpation  RESP: lungs clear to auscultation - no rales, rhonchi or wheezes  CV: regular rate and rhythm, normal S1 S2, no S3 or S4, no murmur, click or rub, no peripheral edema and peripheral pulses strong  MS: no gross musculoskeletal defects noted, no edema  NEURO: Normal strength and tone, mentation intact and speech normal  PSYCH: mentation appears normal and fatigued            Assessment & Plan     1. Acute non-recurrent frontal sinusitis  Treat with this bid for 10 days, follow up if not improving  - cefPROZIL (CEFZIL) 500 MG tablet; Take 1 tablet (500 mg) by mouth 2 times daily for 10 days  Dispense: 20 tablet; Refill: 0  - Beta strep group A culture    2. Sore throat  Rule out strep  - Rapid strep screen     BMI:   Estimated body mass index is 25.85 kg/m  as calculated from the following:    Height as of this encounter: 1.727 m (5' 8\").    Weight as of this encounter: 77.1 kg (170 lb).               Return if symptoms worsen or fail to improve.    Tere Herrera MD  [facility] [place]      "

## 2020-01-13 NOTE — TELEPHONE ENCOUNTER
SUBJECTIVE:  See evisit documentation     OBJECTIVE:  See visit documentation    ASSESSMENT/PLAN:  Per encounter diagnoses and orders. Provider E-Visit time total (minutes): recommendation is made that she be seen

## 2020-01-13 NOTE — PATIENT INSTRUCTIONS
Thank you for choosing us for your care. I think an in-clinic visit would be best next steps based on your symptoms. Please schedule a clinic appointment; you won t be charged for this e-visit.      You can schedule an appointment right here in [facility], or call [phone]    Luly, this may have turned into a sinus infection. Because you are having so much pain I think I should see you to evaluate further. I will have Kimberly call to work you into the schedule for today or tomorrow.

## 2020-01-15 LAB
BACTERIA SPEC CULT: NORMAL
SPECIMEN SOURCE: NORMAL

## 2020-02-17 ENCOUNTER — OFFICE VISIT (OUTPATIENT)
Dept: FAMILY MEDICINE | Facility: OTHER | Age: 55
End: 2020-02-17
Attending: FAMILY MEDICINE
Payer: COMMERCIAL

## 2020-02-17 VITALS
HEIGHT: 68 IN | DIASTOLIC BLOOD PRESSURE: 70 MMHG | BODY MASS INDEX: 24.86 KG/M2 | OXYGEN SATURATION: 98 % | SYSTOLIC BLOOD PRESSURE: 108 MMHG | TEMPERATURE: 97.7 F | HEART RATE: 73 BPM | WEIGHT: 164 LBS

## 2020-02-17 DIAGNOSIS — J01.01 ACUTE RECURRENT MAXILLARY SINUSITIS: Primary | ICD-10-CM

## 2020-02-17 PROCEDURE — 99213 OFFICE O/P EST LOW 20 MIN: CPT | Performed by: FAMILY MEDICINE

## 2020-02-17 ASSESSMENT — MIFFLIN-ST. JEOR: SCORE: 1402.4

## 2020-02-17 ASSESSMENT — PAIN SCALES - GENERAL: PAINLEVEL: MILD PAIN (2)

## 2020-02-17 NOTE — NURSING NOTE
"Chief Complaint   Patient presents with     URI       Initial /70   Pulse 73   Temp 97.7  F (36.5  C)   Ht 1.727 m (5' 8\")   Wt 74.4 kg (164 lb)   SpO2 98%   BMI 24.94 kg/m   Estimated body mass index is 24.94 kg/m  as calculated from the following:    Height as of this encounter: 1.727 m (5' 8\").    Weight as of this encounter: 74.4 kg (164 lb).  Medication Reconciliation: complete  Gaby Bobo  "

## 2020-02-17 NOTE — PROGRESS NOTES
Subjective     Luly Freeman is a 54 year old female who presents to clinic today for the following health issues:    HPI   RESPIRATORY SYMPTOMS      Duration: 3 weeks or so     Description  sore throat, facial pain/pressure, ear pain left, headache and fatigue/malaise    Severity: moderate    Accompanying signs and symptoms: patient states when she bends over she gets a sharp pain in left eye and ear.     History (predisposing factors):  none    Precipitating or alleviating factors: None    Therapies tried and outcome:  Afrin, andrea pot, a week of allegra. -not helping much.         Patient Active Problem List   Diagnosis     Multiple respiratory allergies     ACP (advance care planning)     Past Surgical History:   Procedure Laterality Date     ABDOMEN SURGERY       BREAST SURGERY  3/2007     BUNIONECTOMY Right 10/22/2018    Procedure: RIGHT FOOT 1ST METATARSAL DISTAL CHEVERON OSTEOTOMY AND LATERAL SOFT FISSUE RELEASE;  Surgeon: Netta Soni DPM;  [facility]     BUNIONECTOMY RT/LT  2008     COLONOSCOPY N/A 10/30/2017    Procedure: COLONOSCOPY;  WHOLE COLON COLONOSCOPY ;  Surgeon: Erma Jenkins MD;  [facility]: [facility]     ENT SURGERY  10/2013    nose surgery, Dr. Elliott     EXCISE MASS LOWER EXTREMITY Right 2017    Procedure: EXCISE MASS LOWER EXTREMITY;  EXCISION SOFT TISSUE MASS RIGHT LOWER LEG;  Surgeon: Erma Jenkins MD;  [facility]: [facility]     EYE SURGERY  2018    Lasik surgery            GYN SURGERY      partial hysterectomy     laparoscopic supracervical hysterectomy      Fibroids, ovaries intact     Left Breast Bx  3/2007    Fibrocystic breast disease     PE tube placement       ventilation tube, left         Social History     Tobacco Use     Smoking status: Never Smoker     Smokeless tobacco: Never Used     Tobacco comment: no passive exposure   Substance Use Topics     Alcohol use: Yes     Comment: occasionally     Family History   Problem Relation Age  "of Onset     C.A.D. Father      Other Cancer Other         pancreatic cancer     Other Cancer Cousin         Pancreatic cancer     Thyroid Disease Paternal Grandmother      Diabetes No family hx of      Cerebrovascular Disease No family hx of      Other Cancer No family hx of          Current Outpatient Medications   Medication Sig Dispense Refill     amoxicillin-clavulanate (AUGMENTIN) 875-125 MG tablet Take 1 tablet by mouth 2 times daily for 10 days 20 tablet 0     estradiol (ESTRACE) 0.5 MG tablet TAKE 1 TABLET(0.5 MG) BY MOUTH DAILY 90 tablet 0     Allergies   Allergen Reactions     Seasonal Allergies      BP Readings from Last 3 Encounters:   02/17/20 108/70   01/13/20 102/67   01/03/20 118/71    Wt Readings from Last 3 Encounters:   02/17/20 74.4 kg (164 lb)   01/13/20 77.1 kg (170 lb)   12/04/19 77.1 kg (170 lb)                      Reviewed and updated as needed this visit by Provider         Review of Systems   ROS COMP: Constitutional, HEENT, cardiovascular, pulmonary, gi and gu systems are negative, except as otherwise noted.      Objective    /70   Pulse 73   Temp 97.7  F (36.5  C)   Ht 1.727 m (5' 8\")   Wt 74.4 kg (164 lb)   SpO2 98%   BMI 24.94 kg/m    Body mass index is 24.94 kg/m .  Physical Exam   GENERAL: healthy, alert and no distress  EYES: Eyes grossly normal to inspection, PERRL and conjunctivae and sclerae normal  HENT: normal cephalic/atraumatic, ear canals and TM's normal, nose and mouth without ulcers or lesions, oropharynx clear, oral mucous membranes moist and sinuses: maxillary, frontal tenderness on left  NECK: no adenopathy, no asymmetry, masses, or scars and thyroid normal to palpation  RESP: lungs clear to auscultation - no rales, rhonchi or wheezes  CV: regular rate and rhythm, normal S1 S2, no S3 or S4, no murmur, click or rub, no peripheral edema and peripheral pulses strong  NEURO: Normal strength and tone, mentation intact and speech normal  PSYCH: mentation appears " normal and fatigued            Assessment & Plan     1. Acute recurrent maxillary sinusitis  Treated with cefzil in January. Will treat with augmentin bid for 10 days amoxicillin-clavulanate (AUGMENTIN) 875-125 MG tablet; Take 1 tablet by mouth 2 times daily for 10 days  Dispense: 20 tablet; Refill: 0           Return if symptoms worsen or fail to improve.    Tere Herrera MD  [facility]

## 2020-03-02 ENCOUNTER — HEALTH MAINTENANCE LETTER (OUTPATIENT)
Age: 55
End: 2020-03-02

## 2020-04-09 DIAGNOSIS — N95.1 MENOPAUSAL SYNDROME (HOT FLASHES): ICD-10-CM

## 2020-04-09 RX ORDER — ESTRADIOL 0.5 MG/1
TABLET ORAL
Qty: 90 TABLET | Refills: 0 | Status: SHIPPED | OUTPATIENT
Start: 2020-04-09 | End: 2020-07-07

## 2020-04-09 NOTE — TELEPHONE ENCOUNTER
estradiol (ESTRACE) 0.5 MG tablet       Last Written Prescription Date:  1/13/20  Last Fill Quantity: 90,   # refills: 0  Last Office Visit: 2/17/20  Future Office visit:       Routing refill request to provider for review/approval because:  Drug not on the [facility], [facility] or [facility] refill protocol or controlled substance

## 2020-07-03 DIAGNOSIS — N95.1 MENOPAUSAL SYNDROME (HOT FLASHES): ICD-10-CM

## 2020-07-07 RX ORDER — ESTRADIOL 0.5 MG/1
TABLET ORAL
Qty: 90 TABLET | Refills: 0 | Status: SHIPPED | OUTPATIENT
Start: 2020-07-07 | End: 2020-11-09

## 2020-07-13 ENCOUNTER — E-VISIT (OUTPATIENT)
Dept: FAMILY MEDICINE | Facility: OTHER | Age: 55
End: 2020-07-13
Payer: COMMERCIAL

## 2020-07-13 DIAGNOSIS — J01.00 ACUTE NON-RECURRENT MAXILLARY SINUSITIS: Primary | ICD-10-CM

## 2020-07-13 PROCEDURE — 99421 OL DIG E/M SVC 5-10 MIN: CPT | Performed by: FAMILY MEDICINE

## 2020-07-13 RX ORDER — FLUTICASONE PROPIONATE 50 MCG
1 SPRAY, SUSPENSION (ML) NASAL DAILY
Qty: 9.9 ML | Refills: 0 | Status: SHIPPED | OUTPATIENT
Start: 2020-07-13 | End: 2021-11-23

## 2020-07-13 NOTE — PATIENT INSTRUCTIONS
Thank you for choosing us for your care. Based on your symptoms and length of illness, I do not think that you need an antibiotic prescription at this time.  Please follow the care advise I ve provided and use the prescribed medication to help relieve your symptoms. View your full visit summary for details by clicking on the link below.     If you re not feeling better within 5-7 days, please respond to this message and we can consider if an antibiotic prescription is needed.  You can schedule an appointment right here in [facility], or call [phone]  If the visit is for the same symptoms as your e-visit, we ll refund the cost of your e-visit if seen within seven days  You may want to try warm salt water gargles or rinses to feel better or help prevent another bout in the future. Mix 1 teaspoon of salt in 8 ounces of water, gargle, and spit. Do this several times a day for several days. Do not swallow the mixture.    You may want to try a nasal lavage (also known as nasal irrigation). You can find over-the-counter products, such as Neti-Pot, at retail locations or make your own at home. Instructions for homemade nasal lavage and more information on the process are available online at http://www.aafp.org/afp/2009/1115/p1121.html.

## 2020-07-13 NOTE — TELEPHONE ENCOUNTER
S/  Sinus pressure, drainage and sore throat for 1-3 days. No fever.     AP/    Luly was seen today for sinus problem.    Diagnoses and all orders for this visit:    Acute non-recurrent maxillary sinusitis  Symptoms < 10 days, mild. Symptomatic cares, if persisting > 10 days, consider abx.   -     fluticasone (FLONASE) 50 MCG/ACT nasal spray; Spray 1 spray in nostril daily      Gina Grullon MD      Provider E-Visit time total (minutes): 5

## 2020-08-28 ENCOUNTER — TRANSFERRED RECORDS (OUTPATIENT)
Dept: HEALTH INFORMATION MANAGEMENT | Facility: CLINIC | Age: 55
End: 2020-08-28

## 2020-11-23 ENCOUNTER — E-VISIT (OUTPATIENT)
Dept: FAMILY MEDICINE | Facility: OTHER | Age: 55
End: 2020-11-23
Payer: COMMERCIAL

## 2020-11-23 DIAGNOSIS — R09.81 CONGESTION OF PARANASAL SINUS: Primary | ICD-10-CM

## 2020-11-23 PROCEDURE — 99421 OL DIG E/M SVC 5-10 MIN: CPT | Performed by: FAMILY MEDICINE

## 2020-11-23 NOTE — PATIENT INSTRUCTIONS
Luly,  This sure sound like your sinus infections however, Covid 19 can present in the same way so I have ordered a Covid test for you to rule this out. I have also sent in a prescription for Augmentin for you to have if this is ruled out so wait to start until you have a negative Covid test. You will be called to schedule the test. Let me know if you have questions or concerns

## 2020-11-23 NOTE — TELEPHONE ENCOUNTER
ELECTRONIC VISIT DOCUMENTATION:    SUBJECTIVE:  Note above accurately captures the substance of my conversation with the patient.    ASSESSMENT/ PLAN:  There are no diagnoses linked to this encounter.    Provider E-Visit time total (minutes): 6

## 2020-11-25 ENCOUNTER — OFFICE VISIT (OUTPATIENT)
Dept: FAMILY MEDICINE | Facility: OTHER | Age: 55
End: 2020-11-25
Attending: FAMILY MEDICINE
Payer: COMMERCIAL

## 2020-11-25 DIAGNOSIS — R09.81 CONGESTION OF PARANASAL SINUS: ICD-10-CM

## 2020-11-25 PROCEDURE — U0003 INFECTIOUS AGENT DETECTION BY NUCLEIC ACID (DNA OR RNA); SEVERE ACUTE RESPIRATORY SYNDROME CORONAVIRUS 2 (SARS-COV-2) (CORONAVIRUS DISEASE [COVID-19]), AMPLIFIED PROBE TECHNIQUE, MAKING USE OF HIGH THROUGHPUT TECHNOLOGIES AS DESCRIBED BY CMS-2020-01-R: HCPCS | Performed by: FAMILY MEDICINE

## 2020-11-27 LAB
SARS-COV-2 RNA SPEC QL NAA+PROBE: NOT DETECTED
SPECIMEN SOURCE: NORMAL

## 2020-12-03 NOTE — PROGRESS NOTES
"Subjective     Luly Freeman is a 55 year old female who presents to clinic today for the following health issues:    HPI         Musculoskeletal problem/pain  Onset/Duration: last Monday, gets massage every 3 weeks and her massage therapist noted something she never felt before and suggested she follow up  Description  Location: neck   Joint Swelling: no  Redness: no  Pain: no  Warmth: no  Intensity:  mild  Progression of Symptoms:  same  Accompanying signs and symptoms:   Fevers: no  Numbness/tingling/weakness: no  History  Trauma to the area: no  Recent illness:  no  Previous similar problem: no  Previous evaluation:  no  Precipitating or alleviating factors:  Aggravating factors include: none  Therapies tried and outcome: nothing    Her therapist noted a prominence over the C6 area of her cervical spine that became more prominent over three weeks' time. She is not having pain with this       Review of Systems   Constitutional, HEENT, cardiovascular, pulmonary, gi and gu systems are negative, except as otherwise noted.      Objective    /74   Pulse 76   Temp 98.4  F (36.9  C) (Tympanic)   Resp 18   Ht 1.727 m (5' 8\")   Wt 72.1 kg (159 lb)   SpO2 98%   BMI 24.18 kg/m    Body mass index is 24.18 kg/m .  Physical Exam   GENERAL APPEARANCE: healthy, alert and no distress  NECK: no adenopathy, no asymmetry, masses, or scars and thyroid normal to palpation  MS: slight prominence over the C6 area, nontender  SKIN: no suspicious lesions or rashes  NEURO: Normal strength and tone, mentation intact and speech normal  PSYCH: mentation appears normal and affect normal/bright    Xray - shows degenerative change at C5C6 no other changes or masses        Assessment & Plan     Bony prominence  Normal anatomy on x ray with mild OA   follow              Return if symptoms worsen or fail to improve.    Tere Herrera MD  [facility] [place]    "

## 2020-12-07 ENCOUNTER — ANCILLARY PROCEDURE (OUTPATIENT)
Dept: GENERAL RADIOLOGY | Facility: OTHER | Age: 55
End: 2020-12-07
Attending: FAMILY MEDICINE
Payer: COMMERCIAL

## 2020-12-07 ENCOUNTER — OFFICE VISIT (OUTPATIENT)
Dept: FAMILY MEDICINE | Facility: OTHER | Age: 55
End: 2020-12-07
Attending: FAMILY MEDICINE
Payer: COMMERCIAL

## 2020-12-07 VITALS
SYSTOLIC BLOOD PRESSURE: 110 MMHG | DIASTOLIC BLOOD PRESSURE: 74 MMHG | BODY MASS INDEX: 24.1 KG/M2 | HEIGHT: 68 IN | HEART RATE: 76 BPM | WEIGHT: 159 LBS | TEMPERATURE: 98.4 F | RESPIRATION RATE: 18 BRPM | OXYGEN SATURATION: 98 %

## 2020-12-07 DIAGNOSIS — M89.8X9 BONY PROMINENCE: Primary | ICD-10-CM

## 2020-12-07 DIAGNOSIS — M89.8X9 BONY PROMINENCE: ICD-10-CM

## 2020-12-07 PROCEDURE — 99213 OFFICE O/P EST LOW 20 MIN: CPT | Performed by: FAMILY MEDICINE

## 2020-12-07 PROCEDURE — 72050 X-RAY EXAM NECK SPINE 4/5VWS: CPT | Mod: TC | Performed by: RADIOLOGY

## 2020-12-07 ASSESSMENT — ANXIETY QUESTIONNAIRES
IF YOU CHECKED OFF ANY PROBLEMS ON THIS QUESTIONNAIRE, HOW DIFFICULT HAVE THESE PROBLEMS MADE IT FOR YOU TO DO YOUR WORK, TAKE CARE OF THINGS AT HOME, OR GET ALONG WITH OTHER PEOPLE: NOT DIFFICULT AT ALL
3. WORRYING TOO MUCH ABOUT DIFFERENT THINGS: NOT AT ALL
2. NOT BEING ABLE TO STOP OR CONTROL WORRYING: NOT AT ALL
5. BEING SO RESTLESS THAT IT IS HARD TO SIT STILL: NOT AT ALL
GAD7 TOTAL SCORE: 0
1. FEELING NERVOUS, ANXIOUS, OR ON EDGE: NOT AT ALL
7. FEELING AFRAID AS IF SOMETHING AWFUL MIGHT HAPPEN: NOT AT ALL
6. BECOMING EASILY ANNOYED OR IRRITABLE: NOT AT ALL

## 2020-12-07 ASSESSMENT — MIFFLIN-ST. JEOR: SCORE: 1374.72

## 2020-12-07 ASSESSMENT — PAIN SCALES - GENERAL: PAINLEVEL: NO PAIN (0)

## 2020-12-07 ASSESSMENT — PATIENT HEALTH QUESTIONNAIRE - PHQ9
5. POOR APPETITE OR OVEREATING: NOT AT ALL
SUM OF ALL RESPONSES TO PHQ QUESTIONS 1-9: 0

## 2020-12-07 NOTE — NURSING NOTE
"Chief Complaint   Patient presents with     Musculoskeletal Problem       Initial /74   Pulse 76   Temp 98.4  F (36.9  C) (Tympanic)   Resp 18   Ht 1.727 m (5' 8\")   Wt 72.1 kg (159 lb)   SpO2 98%   BMI 24.18 kg/m   Estimated body mass index is 24.18 kg/m  as calculated from the following:    Height as of this encounter: 1.727 m (5' 8\").    Weight as of this encounter: 72.1 kg (159 lb).  Medication Reconciliation: complete  Kimberly Ham LPN    "

## 2020-12-08 ASSESSMENT — ANXIETY QUESTIONNAIRES: GAD7 TOTAL SCORE: 0

## 2020-12-20 ENCOUNTER — HEALTH MAINTENANCE LETTER (OUTPATIENT)
Age: 55
End: 2020-12-20

## 2020-12-30 ENCOUNTER — TRANSFERRED RECORDS (OUTPATIENT)
Dept: HEALTH INFORMATION MANAGEMENT | Facility: CLINIC | Age: 55
End: 2020-12-30

## 2021-02-28 ENCOUNTER — HEALTH MAINTENANCE LETTER (OUTPATIENT)
Age: 56
End: 2021-02-28

## 2021-08-10 ENCOUNTER — TRANSFERRED RECORDS (OUTPATIENT)
Dept: HEALTH INFORMATION MANAGEMENT | Facility: CLINIC | Age: 56
End: 2021-08-10

## 2021-08-27 ENCOUNTER — TELEPHONE (OUTPATIENT)
Dept: FAMILY MEDICINE | Facility: OTHER | Age: 56
End: 2021-08-27

## 2021-08-27 DIAGNOSIS — Z20.822 COVID-19 RULED OUT: Primary | ICD-10-CM

## 2021-09-01 ENCOUNTER — OFFICE VISIT (OUTPATIENT)
Dept: FAMILY MEDICINE | Facility: OTHER | Age: 56
End: 2021-09-01
Attending: FAMILY MEDICINE
Payer: COMMERCIAL

## 2021-09-01 DIAGNOSIS — Z20.822 COVID-19 RULED OUT: ICD-10-CM

## 2021-09-01 PROCEDURE — U0005 INFEC AGEN DETEC AMPLI PROBE: HCPCS

## 2021-09-01 PROCEDURE — U0003 INFECTIOUS AGENT DETECTION BY NUCLEIC ACID (DNA OR RNA); SEVERE ACUTE RESPIRATORY SYNDROME CORONAVIRUS 2 (SARS-COV-2) (CORONAVIRUS DISEASE [COVID-19]), AMPLIFIED PROBE TECHNIQUE, MAKING USE OF HIGH THROUGHPUT TECHNOLOGIES AS DESCRIBED BY CMS-2020-01-R: HCPCS

## 2021-09-03 LAB — SARS-COV-2 RNA RESP QL NAA+PROBE: NEGATIVE

## 2021-10-03 ENCOUNTER — HEALTH MAINTENANCE LETTER (OUTPATIENT)
Age: 56
End: 2021-10-03

## 2021-10-27 ENCOUNTER — TRANSFERRED RECORDS (OUTPATIENT)
Dept: HEALTH INFORMATION MANAGEMENT | Facility: HOSPITAL | Age: 56
End: 2021-10-27

## 2021-11-23 ENCOUNTER — NURSE TRIAGE (OUTPATIENT)
Dept: FAMILY MEDICINE | Facility: OTHER | Age: 56
End: 2021-11-23
Payer: COMMERCIAL

## 2021-11-23 ENCOUNTER — OFFICE VISIT (OUTPATIENT)
Dept: FAMILY MEDICINE | Facility: OTHER | Age: 56
End: 2021-11-23
Attending: FAMILY MEDICINE
Payer: COMMERCIAL

## 2021-11-23 VITALS
WEIGHT: 159 LBS | HEART RATE: 74 BPM | DIASTOLIC BLOOD PRESSURE: 70 MMHG | RESPIRATION RATE: 19 BRPM | OXYGEN SATURATION: 98 % | SYSTOLIC BLOOD PRESSURE: 118 MMHG | HEIGHT: 68 IN | TEMPERATURE: 97.5 F | BODY MASS INDEX: 24.1 KG/M2

## 2021-11-23 DIAGNOSIS — L73.9 FOLLICULITIS: Primary | ICD-10-CM

## 2021-11-23 PROCEDURE — 99213 OFFICE O/P EST LOW 20 MIN: CPT | Performed by: FAMILY MEDICINE

## 2021-11-23 RX ORDER — TRIAMCINOLONE ACETONIDE 1 MG/G
CREAM TOPICAL 2 TIMES DAILY
Qty: 45 G | Refills: 1 | Status: SHIPPED | OUTPATIENT
Start: 2021-11-23

## 2021-11-23 ASSESSMENT — PAIN SCALES - GENERAL: PAINLEVEL: NO PAIN (0)

## 2021-11-23 ASSESSMENT — MIFFLIN-ST. JEOR: SCORE: 1369.72

## 2021-11-23 NOTE — PROGRESS NOTES
"  Assessment & Plan     Folliculitis  Most likely from the hot tub  Augmentin bid for 10 days and triamcinolone cream bid   - amoxicillin-clavulanate (AUGMENTIN) 875-125 MG tablet; Take 1 tablet by mouth 2 times daily for 10 days  - triamcinolone (KENALOG) 0.1 % external cream; Apply topically 2 times daily      22 minutes spent on the date of the encounter doing chart review, patient visit and documentation            Return if symptoms worsen or fail to improve.    Tere Herrera MD  [facility] - RUTHIE Mauricio is a 56 year old who presents for the following health issues     HPI     Rash  Onset/Duration: over 3 weeks   Description  Location: bilateral upper thighs were looking like welts, small areas of dry skin on left arm  Character: red  Itching: moderate  Intensity:  mild  Progression of Symptoms:  same  Accompanying signs and symptoms:   Fever: no  Body aches or joint pain: no  Sore throat symptoms: no  Recent cold symptoms: no  History:           Previous episodes of similar rash: yes about a year ago  New exposures:  None  Recent travel: no  Exposure to similar rash: no  Precipitating or alleviating factors:   Therapies tried and outcome: none    She travelled to [place] in October and rented a house. She does have a hot tub at home that she was in. The rash started after the trip to [place]        Review of Systems   Constitutional, HEENT, cardiovascular, pulmonary, gi and gu systems are negative, except as otherwise noted.      Objective    /70   Pulse 74   Temp 97.5  F (36.4  C) (Tympanic)   Resp 19   Ht 1.727 m (5' 8\")   Wt 72.1 kg (159 lb)   SpO2 98%   BMI 24.18 kg/m    Body mass index is 24.18 kg/m .  Physical Exam   GENERAL: healthy, alert and no distress  SKIN: areas of erythema surrounding follicles of the lower legs, thighs, and arms, no lesions of the hands or webbing of fingers   NEURO: Normal strength and tone, mentation intact and speech normal  PSYCH: " mentation appears normal, affect normal/bright

## 2021-11-23 NOTE — NURSING NOTE
"Chief Complaint   Patient presents with     Hives       Initial /70   Pulse 74   Temp 97.5  F (36.4  C) (Tympanic)   Resp 19   Ht 1.727 m (5' 8\")   Wt 72.1 kg (159 lb)   SpO2 98%   BMI 24.18 kg/m   Estimated body mass index is 24.18 kg/m  as calculated from the following:    Height as of this encounter: 1.727 m (5' 8\").    Weight as of this encounter: 72.1 kg (159 lb).  Medication Reconciliation: complete  Kimberly Ham LPN    "

## 2021-11-23 NOTE — TELEPHONE ENCOUNTER
"    Reason for Disposition    Patient wants to be seen    Answer Assessment - Initial Assessment Questions  1. APPEARANCE: \"What does the rash look like?\"       hives  2. LOCATION: \"Where is the rash located?\"       Legs thighs, arm  3. NUMBER: \"How many hives are there?\"       multiple  4. SIZE: \"How big are the hives?\" (inches, cm, compare to coins) \"Do they all look the same or is there lots of variation in shape and size?\"       Same size  5. ONSET: \"When did the hives begin?\" (Hours or days ago)       Dry patches 2 weeks ago and hives started one week ago and seems worse in the mornings  6. ITCHING: \"Does it itch?\" If so, ask: \"How bad is the itch?\"     - MILD: doesn't interfere with normal activities    - MODERATE-SEVERE: interferes with work, school, sleep, or other activities       moderate  7. RECURRENT PROBLEM: \"Have you had hives before?\" If so, ask: \"When was the last time?\" and \"What happened that time?\"       no  8. TRIGGERS: \"Were you exposed to any new food, plant, cosmetic product or animal just before the hives began?\"      unknown  9. OTHER SYMPTOMS: \"Do you have any other symptoms?\" (e.g., fever, tongue swelling, difficulty breathing, abdominal pain)      no  10. PREGNANCY: \"Is there any chance you are pregnant?\" \"When was your last menstrual period?\"        no    Protocols used: HIVES-A-OH      "

## 2021-11-29 ENCOUNTER — OFFICE VISIT (OUTPATIENT)
Dept: FAMILY MEDICINE | Facility: OTHER | Age: 56
End: 2021-11-29
Attending: FAMILY MEDICINE
Payer: COMMERCIAL

## 2021-11-29 ENCOUNTER — NURSE TRIAGE (OUTPATIENT)
Dept: FAMILY MEDICINE | Facility: OTHER | Age: 56
End: 2021-11-29
Payer: COMMERCIAL

## 2021-11-29 DIAGNOSIS — Z20.822 SUSPECTED 2019 NOVEL CORONAVIRUS INFECTION: Primary | ICD-10-CM

## 2021-11-29 DIAGNOSIS — Z20.822 SUSPECTED 2019 NOVEL CORONAVIRUS INFECTION: ICD-10-CM

## 2021-11-29 LAB — SARS-COV-2 RNA RESP QL NAA+PROBE: NORMAL

## 2021-11-29 PROCEDURE — U0003 INFECTIOUS AGENT DETECTION BY NUCLEIC ACID (DNA OR RNA); SEVERE ACUTE RESPIRATORY SYNDROME CORONAVIRUS 2 (SARS-COV-2) (CORONAVIRUS DISEASE [COVID-19]), AMPLIFIED PROBE TECHNIQUE, MAKING USE OF HIGH THROUGHPUT TECHNOLOGIES AS DESCRIBED BY CMS-2020-01-R: HCPCS | Mod: 90

## 2021-11-29 PROCEDURE — U0005 INFEC AGEN DETEC AMPLI PROBE: HCPCS | Mod: 90

## 2021-11-29 NOTE — TELEPHONE ENCOUNTER
Reason for Disposition    COVID-19 Home Isolation, questions about    COVID-19 Testing, questions about    Additional Information    Negative: SEVERE difficulty breathing (e.g., struggling for each breath, speaks in single words)    Negative: Difficult to awaken or acting confused (e.g., disoriented, slurred speech)    Negative: Bluish (or gray) lips or face now    Negative: Shock suspected (e.g., cold/pale/clammy skin, too weak to stand, low BP, rapid pulse)    Negative: Sounds like a life-threatening emergency to the triager    Negative: [1] COVID-19 exposure AND [2] no symptoms    Negative: COVID-19 vaccine reaction suspected (e.g., fever, headache, muscle aches) occurring 1 to 3 days after getting vaccine    Negative: COVID-19 vaccine, questions about    Negative: [1] Lives with someone known to have influenza (flu test positive) AND [2] flu-like symptoms (e.g., cough, runny nose, sore throat, SOB; with or without fever)    Negative: [1] Adult with possible COVID-19 symptoms AND [2] triager concerned about severity of symptoms or other causes    Negative: COVID-19 and breastfeeding, questions about    Negative: SEVERE or constant chest pain or pressure (Exception: mild central chest pain, present only when coughing)    Negative: MODERATE difficulty breathing (e.g., speaks in phrases, SOB even at rest, pulse 100-120)    Negative: [1] Headache AND [2] stiff neck (can't touch chin to chest)    Negative: MILD difficulty breathing (e.g., minimal/no SOB at rest, SOB with walking, pulse <100)    Negative: Chest pain or pressure    Negative: Patient sounds very sick or weak to the triager    Negative: Fever > 103 F (39.4 C)    Negative: [1] Fever > 101 F (38.3 C) AND [2] age > 60 years    Negative: [1] Fever > 100.0 F (37.8 C) AND [2] bedridden (e.g., nursing home patient, CVA, chronic illness, recovering from surgery)    Negative: HIGH RISK for severe COVID complications (e.g., age > 64 years, obesity with BMI >  "25, pregnant, chronic lung disease or other chronic medical condition)  (Exception: Already seen by PCP and no new or worsening symptoms.)    Negative: [1] HIGH RISK patient AND [2] influenza is widespread in the community AND [3] ONE OR MORE respiratory symptoms: cough, sore throat, runny or stuffy nose    Negative: [1] HIGH RISK patient AND [2] influenza exposure within the last 7 days AND [3] ONE OR MORE respiratory symptoms: cough, sore throat, runny or stuffy nose    Negative: [1] COVID-19 infection suspected by caller or triager AND [2] mild symptoms (cough, fever, or others) AND [3] negative COVID-19 rapid test    Negative: Fever present > 3 days (72 hours)    Negative: [1] Fever returns after gone for over 24 hours AND [2] symptoms worse or not improved    Negative: [1] Continuous (nonstop) coughing interferes with work or school AND [2] no improvement using cough treatment per protocol    Negative: Cough present > 3 weeks    Negative: [1] COVID-19 diagnosed by positive lab test AND [2] NO symptoms (e.g., cough, fever, others)    Negative: [1] COVID-19 diagnosed by positive lab test AND [2] mild symptoms (e.g., cough, fever, others) AND [3] no complications or SOB    Negative: [1] COVID-19 diagnosed by doctor (or NP/PA) AND [2] mild symptoms (e.g., cough, fever, others) AND [3] no complications or SOB    Negative: [1] COVID-19 diagnosed AND [2] has mild nausea, vomiting or diarrhea    Answer Assessment - Initial Assessment Questions  1. COVID-19 DIAGNOSIS: \"Who made your Coronavirus (COVID-19) diagnosis?\" \"Was it confirmed by a positive lab test?\" If not diagnosed by a HCP, ask \"Are there lots of cases (community spread) where you live?\" (See public health department website, if unsure)      Not diagnosed  2. COVID-19 EXPOSURE: \"Was there any known exposure to COVID before the symptoms began?\" CDC Definition of close contact: within 6 feet (2 meters) for a total of 15 minutes or more over a 24-hour period. " "      no  3. ONSET: \"When did the COVID-19 symptoms start?\"       Last wednesday  4. WORST SYMPTOM: \"What is your worst symptom?\" (e.g., cough, fever, shortness of breath, muscle aches)      Loss of taste and smell, congestion  5. COUGH: \"Do you have a cough?\" If Yes, ask: \"How bad is the cough?\"        no  6. FEVER: \"Do you have a fever?\" If Yes, ask: \"What is your temperature, how was it measured, and when did it start?\"      no  7. RESPIRATORY STATUS: \"Describe your breathing?\" (e.g., shortness of breath, wheezing, unable to speak)       Not bad  8. BETTER-SAME-WORSE: \"Are you getting better, staying the same or getting worse compared to yesterday?\"  If getting worse, ask, \"In what way?\"      A little better  9. HIGH RISK DISEASE: \"Do you have any chronic medical problems?\" (e.g., asthma, heart or lung disease, weak immune system, obesity, etc.)      no  10. PREGNANCY: \"Is there any chance you are pregnant?\" \"When was your last menstrual period?\"        no  11. OTHER SYMPTOMS: \"Do you have any other symptoms?\"  (e.g., chills, fatigue, headache, loss of smell or taste, muscle pain, sore throat; new loss of smell or taste especially support the diagnosis of COVID-19)        no    Protocols used: CORONAVIRUS (COVID-19) DIAGNOSED OR [id number] 8.25.2021      "

## 2021-11-30 ENCOUNTER — MYC MEDICAL ADVICE (OUTPATIENT)
Dept: FAMILY MEDICINE | Facility: OTHER | Age: 56
End: 2021-11-30
Payer: COMMERCIAL

## 2021-11-30 LAB — SARS-COV-2 RNA RESP QL NAA+PROBE: DETECTED

## 2021-12-05 NOTE — PROGRESS NOTES
SUBJECTIVE:   CC: Luly Freeman is an 56 year old woman who presents for preventive health visit.     Patient has been advised of split billing requirements and indicates understanding: Yes  Healthy Habits:     Getting at least 3 servings of Calcium per day:  Yes    Bi-annual eye exam:  NO    Dental care twice a year:  Yes    Sleep apnea or symptoms of sleep apnea:  None    Diet:  Regular (no restrictions)    Frequency of exercise:  6-7 days/week    Duration of exercise:  15-30 minutes    Taking medications regularly:  Yes    Medication side effects:  None    PHQ-2 Total Score: 2    Additional concerns today:  No          Right shoulder pain  She has been getting PT for this as well as her neck. She is having pain over the distal bicep with no radicular pain; pain continues despite PT. She is following with Dr Stevens for this. Pain of the bilateral upper trapezius muscles without radiation    Today's PHQ-2 Score:   PHQ-2 ( 1999 Pfizer) 12/30/2021   Q1: Little interest or pleasure in doing things 1   Q2: Feeling down, depressed or hopeless 1   PHQ-2 Score 2   PHQ-2 Total Score (12-17 Years)- Positive if 3 or more points; Administer PHQ-A if positive -   Q1: Little interest or pleasure in doing things Several days   Q2: Feeling down, depressed or hopeless Several days   PHQ-2 Score 2       Abuse: Current or Past (Physical, Sexual or Emotional) - No  Do you feel safe in your environment? Yes    Have you ever done Advance Care Planning? (For example, a Health Directive, POLST, or a discussion with a medical provider or your loved ones about your wishes): No, advance care planning information given to patient to review.  Patient declined advance care planning discussion at this time.    Social History     Tobacco Use     Smoking status: Never Smoker     Smokeless tobacco: Never Used     Tobacco comment: no passive exposure   Substance Use Topics     Alcohol use: Yes     Comment: occasionally     If you drink alcohol do  you typically have >3 drinks per day or >7 drinks per week? No    Alcohol Use 2021   Prescreen: >3 drinks/day or >7 drinks/week? No   Prescreen: >3 drinks/day or >7 drinks/week? -       Reviewed orders with patient.  Reviewed health maintenance and updated orders accordingly - Yes  BP Readings from Last 3 Encounters:   21 104/70   21 118/70   20 110/74    Wt Readings from Last 3 Encounters:   21 74.8 kg (165 lb)   21 72.1 kg (159 lb)   20 72.1 kg (159 lb)                  Patient Active Problem List   Diagnosis     Multiple respiratory allergies     ACP (advance care planning)     Past Surgical History:   Procedure Laterality Date     ABDOMEN SURGERY       BREAST SURGERY  3/2007     BUNIONECTOMY Right 10/22/2018    Procedure: RIGHT FOOT 1ST METATARSAL DISTAL CHEVERON OSTEOTOMY AND LATERAL SOFT FISSUE RELEASE;  Surgeon: Netta Soni DPM;  Location: [facility]     BUNIONECTOMY RT/LT  2008     COLONOSCOPY N/A 10/30/2017    Procedure: COLONOSCOPY;  WHOLE COLON COLONOSCOPY ;  Surgeon: Erma Jenkins MD;  Location: [facility]     ENT SURGERY  10/2013    nose surgery, Dr. Elliott     EXCISE MASS LOWER EXTREMITY Right 2017    Procedure: EXCISE MASS LOWER EXTREMITY;  EXCISION SOFT TISSUE MASS RIGHT LOWER LEG;  Surgeon: Erma Jenkins MD;  Location: [facility]     EYE SURGERY  2018    Lasik surgery            GYN SURGERY      partial hysterectomy     laparoscopic supracervical hysterectomy      Fibroids, ovaries intact     Left Breast Bx  3/2007    Fibrocystic breast disease     PE tube placement       ventilation tube, left         Social History     Tobacco Use     Smoking status: Never Smoker     Smokeless tobacco: Never Used     Tobacco comment: no passive exposure   Substance Use Topics     Alcohol use: Yes     Comment: occasionally     Family History   Problem Relation Age of Onset     C.A.D. Father      Other Cancer Other         pancreatic  cancer     Other Cancer Cousin         Pancreatic cancer     Thyroid Disease Paternal Grandmother      Diabetes No family hx of      Cerebrovascular Disease No family hx of      Other Cancer No family hx of          Current Outpatient Medications   Medication Sig Dispense Refill     estradiol (ESTRACE) 0.5 MG tablet TAKE 1 TABLET(0.5 MG) BY MOUTH DAILY 90 tablet 3     triamcinolone (KENALOG) 0.1 % external cream Apply topically 2 times daily 45 g 1     No Known Allergies    Breast Cancer Screening:  Any new diagnosis of family breast, ovarian, or bowel cancer? No    FHS-7:   Breast CA Risk Assessment (FHS-7) 12/22/2021 12/30/2021   Did any of your first-degree relatives have breast or ovarian cancer? No No   Did any of your relatives have bilateral breast cancer? - No   Did any man in your family have breast cancer? - No   Did any woman in your family have breast and ovarian cancer? - No   Did any woman in your family have breast cancer before age 50 y? - No   Do you have 2 or more relatives with breast and/or ovarian cancer? - No   Do you have 2 or more relatives with breast and/or bowel cancer? - No       Mammogram Screening: Recommended annual mammography  Pertinent mammograms are reviewed under the imaging tab.  Mammogram done 12-12-21  History of abnormal Pap smear:   NO - age 30-65 PAP every 5 years with negative HPV co-testing recommended  Last 3 Pap and HPV Results:   PAP / HPV Latest Ref Rng & Units 6/5/2017 2/24/2014   PAP (Historical) - NIL NIL   HPV16 NEG Negative -   HPV18 NEG Negative -   HRHPV NEG Negative -     PAP / HPV Latest Ref Rng & Units 6/5/2017 2/24/2014   PAP (Historical) - NIL NIL   HPV16 NEG Negative -   HPV18 NEG Negative -   HRHPV NEG Negative -     Reviewed and updated as needed this visit by clinical staff  Tobacco  Allergies  Meds   Med Hx  Surg Hx  Fam Hx  Soc Hx       Reviewed and updated as needed this visit by Provider               Past Medical History:   Diagnosis Date      Allergic rhinitis 2011     Depressive disorder  ??     Lump or mass in breast 2007     PONV (postoperative nausea and vomiting)      Recurrent sinusitis 2011     Varicose veins 2011      Past Surgical History:   Procedure Laterality Date     ABDOMEN SURGERY  2011     BREAST SURGERY  3/2007     BUNIONECTOMY Right 10/22/2018    Procedure: RIGHT FOOT 1ST METATARSAL DISTAL CHEVERON OSTEOTOMY AND LATERAL SOFT FISSUE RELEASE;  Surgeon: Netta Soni DPM;  Location: [facility]     BUNIONECTOMY RT/LT  2008     COLONOSCOPY N/A 10/30/2017    Procedure: COLONOSCOPY;  WHOLE COLON COLONOSCOPY ;  Surgeon: Erma Jenkins MD;  Location: [facility]     ENT SURGERY  10/2013    nose surgery, Dr. Elliott     EXCISE MASS LOWER EXTREMITY Right 2017    Procedure: EXCISE MASS LOWER EXTREMITY;  EXCISION SOFT TISSUE MASS RIGHT LOWER LEG;  Surgeon: Erma Jenkins MD;  Location: [facility]     EYE SURGERY  2018    Lasik surgery            GYN SURGERY      partial hysterectomy     laparoscopic supracervical hysterectomy      Fibroids, ovaries intact     Left Breast Bx  3/2007    Fibrocystic breast disease     PE tube placement       ventilation tube, left       OB History   No obstetric history on file.       Review of Systems  CONSTITUTIONAL: NEGATIVE for fever, chills, change in weight  INTEGUMENTARU/SKIN: NEGATIVE for worrisome rashes, moles or lesions  EYES: NEGATIVE for vision changes or irritation  ENT: NEGATIVE for ear, mouth and throat problems  RESP: NEGATIVE for significant cough or SOB  BREAST: NEGATIVE for masses, tenderness or discharge  CV: NEGATIVE for chest pain, palpitations or peripheral edema  GI: NEGATIVE for nausea, abdominal pain, heartburn, or change in bowel habits  : NEGATIVE for unusual urinary or vaginal symptoms. Periods are regular.  MUSCULOSKELETAL: NEGATIVE for significant arthralgias or myalgia  NEURO: NEGATIVE for weakness, dizziness or  paresthesias  PSYCHIATRIC: NEGATIVE for changes in mood or affect     OBJECTIVE:   /70 (BP Location: Left arm, Patient Position: Sitting, Cuff Size: Adult Regular)   Pulse 82   Temp 97.4  F (36.3  C) (Tympanic)   Resp 18   Wt 74.8 kg (165 lb)   SpO2 99%   BMI 25.09 kg/m    Physical Exam  GENERAL APPEARANCE: healthy, alert and no distress  EYES: Eyes grossly normal to inspection, PERRL and conjunctivae and sclerae normal  HENT: ear canals and TM's normal, nose and mouth without ulcers or lesions, oropharynx clear and oral mucous membranes moist  NECK: no adenopathy, no asymmetry, masses, or scars and thyroid normal to palpation  RESP: lungs clear to auscultation - no rales, rhonchi or wheezes  BREAST: normal without masses, tenderness or nipple discharge and no palpable axillary masses or adenopathy  CV: regular rate and rhythm, normal S1 S2, no S3 or S4, no murmur, click or rub, no peripheral edema and peripheral pulses strong  ABDOMEN: soft, nontender, no hepatosplenomegaly, no masses and bowel sounds normal  MS: no musculoskeletal defects are noted and gait is age appropriate without ataxia  SKIN: no suspicious lesions or rashes  NEURO: Normal strength and tone, sensory exam grossly normal, mentation intact and speech normal  PSYCH: mentation appears normal and affect normal/bright        ASSESSMENT/PLAN:   (Z00.00) Routine general medical examination at a health care facility  (primary encounter diagnosis)  Comment:   Plan: doing well     (N95.1) Menopausal syndrome (hot flashes)  Comment:   Plan: estradiol (ESTRACE) 0.5 MG tablet, CBC with         platelets, Comprehensive metabolic panel        Renewed, using intermittently    (Z13.220) Lipid screening  Comment:   Plan: Lipid Profile (Chol, Trig, HDL, LDL calc)        Due for lab today    (M25.511,  G89.29) Chronic right shoulder pain  Comment:   Plan: MR Shoulder Right w/o Contrast, Orthopedic          Referral        Will obtain MRI, do  "not feel that MRI of the neck is needed     (Z11.59) Encounter for hepatitis C screening test for low risk patient  Comment:   Plan: Hepatitis C Screen Reflex to HCV RNA Quant and         Genotype        Discussed will obtain      COUNSELING:  Reviewed preventive health counseling, as reflected in patient instructions       Regular exercise       Healthy diet/nutrition    Estimated body mass index is 25.09 kg/m  as calculated from the following:    Height as of 11/23/21: 1.727 m (5' 8\").    Weight as of this encounter: 74.8 kg (165 lb).        She reports that she has never smoked. She has never used smokeless tobacco.      Counseling Resources:  ATP IV Guidelines  Pooled Cohorts Equation Calculator  Breast Cancer Risk Calculator  BRCA-Related Cancer Risk Assessment: FHS-7 Tool  FRAX Risk Assessment  ICSI Preventive Guidelines  Dietary Guidelines for Americans, 2010  USDA's MyPlate  ASA Prophylaxis  Lung CA Screening    Tere Herrera MD  [facility] - [place]  "

## 2021-12-22 ENCOUNTER — ANCILLARY PROCEDURE (OUTPATIENT)
Dept: MAMMOGRAPHY | Facility: OTHER | Age: 56
End: 2021-12-22
Attending: FAMILY MEDICINE
Payer: COMMERCIAL

## 2021-12-22 ENCOUNTER — TELEPHONE (OUTPATIENT)
Dept: MAMMOGRAPHY | Facility: OTHER | Age: 56
End: 2021-12-22

## 2021-12-22 DIAGNOSIS — Z12.31 VISIT FOR SCREENING MAMMOGRAM: ICD-10-CM

## 2021-12-22 PROCEDURE — 77063 BREAST TOMOSYNTHESIS BI: CPT | Mod: TC | Performed by: STUDENT IN AN ORGANIZED HEALTH CARE EDUCATION/TRAINING PROGRAM

## 2021-12-22 PROCEDURE — 77067 SCR MAMMO BI INCL CAD: CPT | Mod: TC | Performed by: STUDENT IN AN ORGANIZED HEALTH CARE EDUCATION/TRAINING PROGRAM

## 2021-12-30 ENCOUNTER — OFFICE VISIT (OUTPATIENT)
Dept: FAMILY MEDICINE | Facility: OTHER | Age: 56
End: 2021-12-30
Attending: FAMILY MEDICINE
Payer: COMMERCIAL

## 2021-12-30 VITALS
TEMPERATURE: 97.4 F | WEIGHT: 165 LBS | OXYGEN SATURATION: 99 % | BODY MASS INDEX: 25.09 KG/M2 | DIASTOLIC BLOOD PRESSURE: 70 MMHG | HEART RATE: 82 BPM | SYSTOLIC BLOOD PRESSURE: 104 MMHG | RESPIRATION RATE: 18 BRPM

## 2021-12-30 DIAGNOSIS — Z13.220 LIPID SCREENING: ICD-10-CM

## 2021-12-30 DIAGNOSIS — G89.29 CHRONIC RIGHT SHOULDER PAIN: ICD-10-CM

## 2021-12-30 DIAGNOSIS — Z00.00 ROUTINE GENERAL MEDICAL EXAMINATION AT A HEALTH CARE FACILITY: Primary | ICD-10-CM

## 2021-12-30 DIAGNOSIS — Z11.59 ENCOUNTER FOR HEPATITIS C SCREENING TEST FOR LOW RISK PATIENT: ICD-10-CM

## 2021-12-30 DIAGNOSIS — M25.511 CHRONIC RIGHT SHOULDER PAIN: ICD-10-CM

## 2021-12-30 DIAGNOSIS — N95.1 MENOPAUSAL SYNDROME (HOT FLASHES): ICD-10-CM

## 2021-12-30 LAB
ALBUMIN SERPL-MCNC: 3.9 G/DL (ref 3.4–5)
ALP SERPL-CCNC: 62 U/L (ref 40–150)
ALT SERPL W P-5'-P-CCNC: 30 U/L (ref 0–50)
ANION GAP SERPL CALCULATED.3IONS-SCNC: 5 MMOL/L (ref 3–14)
AST SERPL W P-5'-P-CCNC: 22 U/L (ref 0–45)
BILIRUB SERPL-MCNC: 0.6 MG/DL (ref 0.2–1.3)
BUN SERPL-MCNC: 28 MG/DL (ref 7–30)
CALCIUM SERPL-MCNC: 8.9 MG/DL (ref 8.5–10.1)
CHLORIDE BLD-SCNC: 108 MMOL/L (ref 94–109)
CHOLEST SERPL-MCNC: 214 MG/DL
CO2 SERPL-SCNC: 28 MMOL/L (ref 20–32)
CREAT SERPL-MCNC: 0.93 MG/DL (ref 0.52–1.04)
ERYTHROCYTE [DISTWIDTH] IN BLOOD BY AUTOMATED COUNT: 13 % (ref 10–15)
FASTING STATUS PATIENT QL REPORTED: NO
GFR SERPL CREATININE-BSD FRML MDRD: 73 ML/MIN/1.73M2
GLUCOSE BLD-MCNC: 97 MG/DL (ref 70–99)
HCT VFR BLD AUTO: 41 % (ref 35–47)
HDLC SERPL-MCNC: 109 MG/DL
HGB BLD-MCNC: 13.5 G/DL (ref 11.7–15.7)
LDLC SERPL CALC-MCNC: 93 MG/DL
MCH RBC QN AUTO: 30.3 PG (ref 26.5–33)
MCHC RBC AUTO-ENTMCNC: 32.9 G/DL (ref 31.5–36.5)
MCV RBC AUTO: 92 FL (ref 78–100)
NONHDLC SERPL-MCNC: 105 MG/DL
PLATELET # BLD AUTO: 266 10E3/UL (ref 150–450)
POTASSIUM BLD-SCNC: 3.7 MMOL/L (ref 3.4–5.3)
PROT SERPL-MCNC: 7.9 G/DL (ref 6.8–8.8)
RBC # BLD AUTO: 4.45 10E6/UL (ref 3.8–5.2)
SODIUM SERPL-SCNC: 141 MMOL/L (ref 133–144)
TRIGL SERPL-MCNC: 62 MG/DL
WBC # BLD AUTO: 9.3 10E3/UL (ref 4–11)

## 2021-12-30 PROCEDURE — 85027 COMPLETE CBC AUTOMATED: CPT | Performed by: FAMILY MEDICINE

## 2021-12-30 PROCEDURE — 86803 HEPATITIS C AB TEST: CPT | Performed by: FAMILY MEDICINE

## 2021-12-30 PROCEDURE — 99396 PREV VISIT EST AGE 40-64: CPT | Performed by: FAMILY MEDICINE

## 2021-12-30 PROCEDURE — 36415 COLL VENOUS BLD VENIPUNCTURE: CPT | Performed by: FAMILY MEDICINE

## 2021-12-30 PROCEDURE — 80053 COMPREHEN METABOLIC PANEL: CPT | Performed by: FAMILY MEDICINE

## 2021-12-30 PROCEDURE — 80061 LIPID PANEL: CPT | Performed by: FAMILY MEDICINE

## 2021-12-30 RX ORDER — ESTRADIOL 0.5 MG/1
TABLET ORAL
Qty: 90 TABLET | Refills: 3 | Status: SHIPPED | OUTPATIENT
Start: 2021-12-30 | End: 2023-03-08

## 2021-12-30 ASSESSMENT — ANXIETY QUESTIONNAIRES
IF YOU CHECKED OFF ANY PROBLEMS ON THIS QUESTIONNAIRE, HOW DIFFICULT HAVE THESE PROBLEMS MADE IT FOR YOU TO DO YOUR WORK, TAKE CARE OF THINGS AT HOME, OR GET ALONG WITH OTHER PEOPLE: NOT DIFFICULT AT ALL
1. FEELING NERVOUS, ANXIOUS, OR ON EDGE: SEVERAL DAYS
GAD7 TOTAL SCORE: 3
2. NOT BEING ABLE TO STOP OR CONTROL WORRYING: SEVERAL DAYS
6. BECOMING EASILY ANNOYED OR IRRITABLE: NOT AT ALL
7. FEELING AFRAID AS IF SOMETHING AWFUL MIGHT HAPPEN: NOT AT ALL
5. BEING SO RESTLESS THAT IT IS HARD TO SIT STILL: NOT AT ALL
3. WORRYING TOO MUCH ABOUT DIFFERENT THINGS: SEVERAL DAYS
4. TROUBLE RELAXING: NOT AT ALL

## 2021-12-30 ASSESSMENT — PATIENT HEALTH QUESTIONNAIRE - PHQ9: SUM OF ALL RESPONSES TO PHQ QUESTIONS 1-9: 3

## 2021-12-30 ASSESSMENT — PAIN SCALES - GENERAL: PAINLEVEL: NO PAIN (0)

## 2021-12-30 NOTE — NURSING NOTE
"Chief Complaint   Patient presents with     Physical       Initial /70 (BP Location: Left arm, Patient Position: Sitting, Cuff Size: Adult Regular)   Pulse 82   Temp 97.4  F (36.3  C) (Tympanic)   Resp 18   Wt 74.8 kg (165 lb)   SpO2 99%   BMI 25.09 kg/m   Estimated body mass index is 25.09 kg/m  as calculated from the following:    Height as of 11/23/21: 1.727 m (5' 8\").    Weight as of this encounter: 74.8 kg (165 lb).  Medication Reconciliation: complete  Diana Ojeda LPN  "

## 2021-12-31 ASSESSMENT — ANXIETY QUESTIONNAIRES: GAD7 TOTAL SCORE: 3

## 2022-01-06 ENCOUNTER — HOSPITAL ENCOUNTER (OUTPATIENT)
Dept: MRI IMAGING | Facility: HOSPITAL | Age: 57
Discharge: HOME | End: 2022-01-06
Attending: FAMILY MEDICINE | Admitting: FAMILY MEDICINE
Payer: COMMERCIAL

## 2022-01-06 ENCOUNTER — TELEPHONE (OUTPATIENT)
Dept: FAMILY MEDICINE | Facility: OTHER | Age: 57
End: 2022-01-06
Payer: COMMERCIAL

## 2022-01-06 DIAGNOSIS — M25.519 CHRONIC SHOULDER PAIN, UNSPECIFIED LATERALITY: ICD-10-CM

## 2022-01-06 DIAGNOSIS — G89.29 CHRONIC RIGHT SHOULDER PAIN: ICD-10-CM

## 2022-01-06 DIAGNOSIS — M25.511 CHRONIC RIGHT SHOULDER PAIN: ICD-10-CM

## 2022-01-06 DIAGNOSIS — S43.431D TEAR OF RIGHT GLENOID LABRUM, SUBSEQUENT ENCOUNTER: Primary | ICD-10-CM

## 2022-01-06 DIAGNOSIS — G89.29 CHRONIC SHOULDER PAIN, UNSPECIFIED LATERALITY: ICD-10-CM

## 2022-01-06 PROCEDURE — 73221 MRI JOINT UPR EXTREM W/O DYE: CPT | Mod: RT

## 2022-01-24 ENCOUNTER — TRANSFERRED RECORDS (OUTPATIENT)
Dept: HEALTH INFORMATION MANAGEMENT | Facility: CLINIC | Age: 57
End: 2022-01-24

## 2022-06-12 ASSESSMENT — ANXIETY QUESTIONNAIRES
4. TROUBLE RELAXING: SEVERAL DAYS
7. FEELING AFRAID AS IF SOMETHING AWFUL MIGHT HAPPEN: NOT AT ALL
5. BEING SO RESTLESS THAT IT IS HARD TO SIT STILL: NOT AT ALL
1. FEELING NERVOUS, ANXIOUS, OR ON EDGE: SEVERAL DAYS
GAD7 TOTAL SCORE: 5
GAD7 TOTAL SCORE: 5
7. FEELING AFRAID AS IF SOMETHING AWFUL MIGHT HAPPEN: NOT AT ALL
2. NOT BEING ABLE TO STOP OR CONTROL WORRYING: SEVERAL DAYS
GAD7 TOTAL SCORE: 5
3. WORRYING TOO MUCH ABOUT DIFFERENT THINGS: SEVERAL DAYS
6. BECOMING EASILY ANNOYED OR IRRITABLE: SEVERAL DAYS

## 2022-06-12 ASSESSMENT — PATIENT HEALTH QUESTIONNAIRE - PHQ9
SUM OF ALL RESPONSES TO PHQ QUESTIONS 1-9: 9
SUM OF ALL RESPONSES TO PHQ QUESTIONS 1-9: 9

## 2022-06-14 ASSESSMENT — PATIENT HEALTH QUESTIONNAIRE - PHQ9: SUM OF ALL RESPONSES TO PHQ QUESTIONS 1-9: 9

## 2022-06-14 ASSESSMENT — ANXIETY QUESTIONNAIRES: GAD7 TOTAL SCORE: 5

## 2022-06-14 NOTE — PROGRESS NOTES
Assessment & Plan     1. Encounter to establish care    2. Intrinsic eczema  Chronic, breakthrough despite 0.1%; will trial increased dose.  She will notify me if desires to return to lower strength.  Aware to use 2 weeks on; 1 week off for dermatologic effects with chronic use.  - triamcinolone (KENALOG) 0.5 % external ointment; Apply small amount to affected area up to twice daily x 14 days; then one week off.  Dispense: 15 g; Refill: 5    3. Multiple respiratory allergies  Chronic, stable.  otc meds prn.    4. Spondylosis of cervical region without myelopathy or radiculopathy  Chronic, seen on imaging in 2016.  Instructed on neck strengthening/stretching.  Ice.  voltaren gel or NSAIDs.  If progressively worsening; will consider repeat imaging to evaluate degree of disc/nerve involvement - would require MR as plain films would not be helpful in differentiating.    5. Recurrent major depressive disorder, in remission (H)  Was worse over winter.  See below.       Depression Screening Follow Up  PHQ 6/12/2022   PHQ-9 Total Score 9   Q9: Thoughts of better off dead/self-harm past 2 weeks Several days   F/U: Thoughts of suicide or self-harm No   F/U: Safety concerns No     Follow Up      Follow Up Actions Taken  Patient declines need for action at this time as she has had recurrent symptoms over years.  Currently improved due to seasonal changes.  Has SAD lamp.  Aware to consider counselling and [facility] recommended to her as she works in [place].    Discussed the following ways the patient can remain in a safe environment:  be around others    No follow-ups on file.    Estella Gamble, [facility]    [facility]   Luly is a 57 year old who presents for the following health issues:    History of Present Illness       Reason for visit:  C-5/6 neck pain    She eats 2-3 servings of fruits and vegetables daily.She consumes 0 sweetened beverage(s) daily.She exercises with enough  effort to increase her heart rate 30 to 60 minutes per day.  She exercises with enough effort to increase her heart rate 3 or less days per week.   She is taking medications regularly.    Today's PHQ-9         PHQ-9 Total Score: 9    PHQ-9 Q9 Thoughts of better off dead/self-harm past 2 weeks :   Several days  Thoughts of suicide or self harm: (P) No  Self-harm Plan:     Self-harm Action:       Safety concerns for self or others: (P) No      Today's SIMON-7 Score: 5         Depression and Anxiety Follow-Up    How are you doing with your depression since your last visit? Was bad over the winter; improved now slightly    How are you doing with your anxiety since your last visit?  Stable/improved.    Are you having other symptoms that might be associated with depression or anxiety? No    Have you had a significant life event? No     Do you have any concerns with your use of alcohol or other drugs? No    Social History     Tobacco Use     Smoking status: Never Smoker     Smokeless tobacco: Never Used     Tobacco comment: no passive exposure   Vaping Use     Vaping Use: Never used   Substance Use Topics     Alcohol use: Yes     Comment: occasionally     Drug use: No     PHQ 12/7/2020 12/30/2021 6/12/2022   PHQ-9 Total Score 0 3 9   Q9: Thoughts of better off dead/self-harm past 2 weeks Not at all Not at all Several days   F/U: Thoughts of suicide or self-harm - - No   F/U: Safety concerns - - No     SIMON-7 SCORE 12/7/2020 12/30/2021 6/12/2022   Total Score - - 5 (mild anxiety)   Total Score 0 3 5       C5-6 pain: XR from 12/2020 showing mild degenerative disc disease.  Has been seeing Dr. Stevens with steroid injection into R shoulder as of late.    Eczema: intermittent patches of dryness/rough skin on legs primarily.  Has triamcinolone, but not always effective.    Past Medical History:   Diagnosis Date     Allergic rhinitis 06/22/2011     Depressive disorder 2008 ??     Lump or mass in breast 03/12/2007     PONV  (postoperative nausea and vomiting)      Recurrent sinusitis 2011     Varicose veins 2011     Past Surgical History:   Procedure Laterality Date     ABDOMEN SURGERY  2011     BREAST SURGERY  3/2007     BUNIONECTOMY Right 10/22/2018    Procedure: RIGHT FOOT 1ST METATARSAL DISTAL CHEVERON OSTEOTOMY AND LATERAL SOFT FISSUE RELEASE;  Surgeon: Netta Soni DPM;  Location: [facility]     BUNIONECTOMY RT/LT  2008     COLONOSCOPY N/A 10/30/2017    Procedure: COLONOSCOPY;  WHOLE COLON COLONOSCOPY ;  Surgeon: Erma Jenkins MD;  Location: [facility] OR     ENT SURGERY  10/2013    nose surgery, Dr. Elliott     EXCISE MASS LOWER EXTREMITY Right 2017    Procedure: EXCISE MASS LOWER EXTREMITY;  EXCISION SOFT TISSUE MASS RIGHT LOWER LEG;  Surgeon: Erma Jenkins MD;  Location: [facility] OR     EYE SURGERY  2018    Lasik surgery            GYN SURGERY      partial hysterectomy     laparoscopic supracervical hysterectomy      Fibroids, ovaries intact     Left Breast Bx  3/2007    Fibrocystic breast disease     PE tube placement       ventilation tube, left       Family History   Problem Relation Age of Onset     No Known Problems Mother      C.A.D. Father      Thyroid Disease Father         nodule; surgical removal     Thyroid Disease Sister      Neurologic Disorder Brother         unknown     Other - See Comments Maternal Grandfather         possible MI COD     Thyroid Disease Paternal Grandmother      Other - See Comments Paternal Grandfather         possible MI COD     Other Cancer Cousin         Pancreatic cancer; 6 month     Other Cancer Maternal Aunt         pancreatic cancer; on year 11 ()     Diabetes No family hx of      Cerebrovascular Disease No family hx of      Other Cancer No family hx of      Social History     Socioeconomic History     Marital status:      Spouse name: None     Number of children: None     Years of education: None     Highest education level: None  "  Occupational History     Occupation: cosmotologist     Comment: Full-time   Tobacco Use     Smoking status: Never Smoker     Smokeless tobacco: Never Used     Tobacco comment: no passive exposure   Vaping Use     Vaping Use: Never used   Substance and Sexual Activity     Alcohol use: Yes     Comment: occasionally     Drug use: No     Sexual activity: Yes     Partners: Male     Birth control/protection: Female Surgical   Other Topics Concern     Caffeine Concern Yes     Comment: coffee, 1 cup daily     Parent/sibling w/ CABG, MI or angioplasty before 65F 55M? No       Review of Systems   CONSTITUTIONAL: NEGATIVE for fever, chills, change in weight  ENT/MOUTH: NEGATIVE for ear, mouth and throat problems  RESP: NEGATIVE for significant cough or SOB  CV: NEGATIVE for chest pain, palpitations or peripheral edema      Objective    /68   Pulse 72   Temp (!) 96.7  F (35.9  C) (Tympanic)   Resp 16   Ht 1.727 m (5' 8\")   Wt 77.1 kg (170 lb)   SpO2 99%   BMI 25.85 kg/m    Body mass index is 25.85 kg/m .  Physical Exam   GENERAL: healthy, alert and no distress  EYES: Eyes grossly normal to inspection, PERRL and conjunctivae and sclerae normal  HENT: ear canals and TM's normal, nose and mouth without ulcers or lesions  NECK: no adenopathy, no asymmetry, masses, or scars and thyroid normal to palpation  RESP: lungs clear to auscultation - no rales, rhonchi or wheezes  CV: regular rate and rhythm, normal S1 S2, no S3 or S4, no murmur, click or rub, no peripheral edema and peripheral pulses strong  MS: no gross musculoskeletal defects noted, no edema  SKIN: xerosis - lower legs  NEURO: Normal strength and tone, mentation intact and speech normal  PSYCH: mentation appears normal, affect normal/bright    No results found for any visits on 06/15/22.        "

## 2022-06-15 ENCOUNTER — OFFICE VISIT (OUTPATIENT)
Dept: FAMILY MEDICINE | Facility: OTHER | Age: 57
End: 2022-06-15
Attending: FAMILY MEDICINE
Payer: COMMERCIAL

## 2022-06-15 VITALS
RESPIRATION RATE: 16 BRPM | BODY MASS INDEX: 25.76 KG/M2 | SYSTOLIC BLOOD PRESSURE: 114 MMHG | WEIGHT: 170 LBS | HEART RATE: 72 BPM | OXYGEN SATURATION: 99 % | TEMPERATURE: 96.7 F | HEIGHT: 68 IN | DIASTOLIC BLOOD PRESSURE: 68 MMHG

## 2022-06-15 DIAGNOSIS — M47.812 SPONDYLOSIS OF CERVICAL REGION WITHOUT MYELOPATHY OR RADICULOPATHY: ICD-10-CM

## 2022-06-15 DIAGNOSIS — J30.9 MULTIPLE RESPIRATORY ALLERGIES: ICD-10-CM

## 2022-06-15 DIAGNOSIS — Z76.89 ENCOUNTER TO ESTABLISH CARE: Primary | ICD-10-CM

## 2022-06-15 DIAGNOSIS — F33.40 RECURRENT MAJOR DEPRESSIVE DISORDER, IN REMISSION (H): ICD-10-CM

## 2022-06-15 DIAGNOSIS — L20.84 INTRINSIC ECZEMA: ICD-10-CM

## 2022-06-15 PROCEDURE — 99204 OFFICE O/P NEW MOD 45 MIN: CPT | Performed by: FAMILY MEDICINE

## 2022-06-15 RX ORDER — TRIAMCINOLONE ACETONIDE 5 MG/G
OINTMENT TOPICAL
Qty: 15 G | Refills: 5 | Status: SHIPPED | OUTPATIENT
Start: 2022-06-15 | End: 2022-11-30

## 2022-06-15 RX ORDER — TRIAMCINOLONE ACETONIDE 1 MG/G
CREAM TOPICAL 2 TIMES DAILY
Qty: 45 G | Refills: 1 | Status: CANCELLED | OUTPATIENT
Start: 2022-06-15

## 2022-06-15 ASSESSMENT — PAIN SCALES - GENERAL: PAINLEVEL: NO PAIN (0)

## 2022-06-15 NOTE — NURSING NOTE
"Chief Complaint   Patient presents with     Recheck Medication       Initial /68   Pulse 72   Temp (!) 96.7  F (35.9  C) (Tympanic)   Resp 16   Ht 1.727 m (5' 8\")   Wt 77.1 kg (170 lb)   SpO2 99%   BMI 25.85 kg/m   Estimated body mass index is 25.85 kg/m  as calculated from the following:    Height as of this encounter: 1.727 m (5' 8\").    Weight as of this encounter: 77.1 kg (170 lb).  Medication Reconciliation: complete    Maria Teresa Lama LPN     Advanced Care Directive reviewed.     "

## 2022-06-16 PROBLEM — F33.40 RECURRENT MAJOR DEPRESSIVE DISORDER, IN REMISSION (H): Status: ACTIVE | Noted: 2022-06-16

## 2022-09-04 ENCOUNTER — HEALTH MAINTENANCE LETTER (OUTPATIENT)
Age: 57
End: 2022-09-04

## 2022-11-26 NOTE — PROGRESS NOTES
"  Assessment & Plan     1. Venous insufficiency of left lower extremity  2. Left leg pain  Chronic, worsening.  Likely venous insufficiency with family/personal history, slow progression and constellation of symptoms.  Although she is reluctant to describe her sensation as pain; it is waking her up at night occasionally, therefore mild pain is appropriate to describe.  Not consistent with blood clot due to duration, location and progression/lack of significant symptoms.  Not likely a bone lesion without TTP to bony area.  But did discuss other possibilities in the office today.  Will plan to proceed with US mapping of LEs to evaluate for venous insufficiency as she has already tried conservative measures.  - US Lower Extremity Venous Mapping Bilateral; Future    MDM:  Review of the result(s) of each unique test - prior venous mapping done in 2014  Ordering of each unique test    BMI:   Estimated body mass index is 26.76 kg/m  as calculated from the following:    Height as of 6/15/22: 1.727 m (5' 8\").    Weight as of this encounter: 79.8 kg (176 lb).   Weight management plan: Discussed healthy diet and exercise guidelines    Follow up pending US results.    Estella Gamble, [facility]      [facility]   Luly is a 57 year old, presenting for the following health issues:  Derm Problem (Lump on left leg )      History of Present Illness       Reason for visit:  Lump on my lower leg  Symptom onset:  More than a month  Symptoms include:  Aching in leg.  Symptom intensity:  Mild  Symptom progression:  Worsening  Had these symptoms before:  No    She eats 2-3 servings of fruits and vegetables daily.She consumes 0 sweetened beverage(s) daily.She exercises with enough effort to increase her heart rate 20 to 29 minutes per day.  She exercises with enough effort to increase her heart rate 3 or less days per week.   She is taking medications regularly.    Today's PHQ-9         PHQ-9 Total Score: " "7    PHQ-9 Q9 Thoughts of better off dead/self-harm past 2 weeks :   Not at all    How difficult have these problems made it for you to do your work, take care of things at home, or get along with other people: Not difficult at all  Today's SIMON-7 Score: 5     Luly is here to have a lump evaluated on her leg.   First noted ~1 year ago.  Describes as always there; but can wax and wane if she is wearing compression stockings vs low socks, positional, etc.  Not red/hot/swollen.  But can feel like something wraps around to the back.  Reluctant to call it pain; as it is just an annoying ache that she is aware of.  Doesn't stop her from activities.  Can bother her and wake her at night.  Can occasionally feel \"warm.\"  Has routinely worn compression stockings for years (both RLE and LLE), more consistent wearing in the winter due to ease of compliance.  Has had Rx strength compression stockings, but prefers some OTC products.    Has had venous insufficiency on R; and was planning a stripping procedure but then had a portion of a vein removed that helped improve pain on R side.  This was more focused around the calf.  + family history of varicose veins; brother had first vein procedure in his 30s.        Objective    /80   Pulse 81   Temp 96.8  F (36  C) (Tympanic)   Resp 16   Wt 79.8 kg (176 lb)   LMP  (LMP Unknown)   SpO2 100%   BMI 26.76 kg/m    Body mass index is 26.76 kg/m .  Physical Exam   GENERAL: healthy, alert and no distress  MS: normal muscle tone, normal range of motion.  More fullness to proximal ankle above malleoli; but not necessarily edematous - more soft tissue/fatty fullness.  No skin changes to area.  No definite lipoma or subcutaneous mass/tumor.  No TTP of bony tibia.  SKIN: no suspicious lesions or rashes  NEURO: Normal strength and tone, mentation intact and speech normal    No results found for any visits on 11/30/22.  "

## 2022-11-30 ENCOUNTER — OFFICE VISIT (OUTPATIENT)
Dept: FAMILY MEDICINE | Facility: OTHER | Age: 57
End: 2022-11-30
Attending: FAMILY MEDICINE
Payer: COMMERCIAL

## 2022-11-30 VITALS
WEIGHT: 176 LBS | OXYGEN SATURATION: 100 % | TEMPERATURE: 96.8 F | DIASTOLIC BLOOD PRESSURE: 80 MMHG | HEART RATE: 81 BPM | RESPIRATION RATE: 16 BRPM | SYSTOLIC BLOOD PRESSURE: 128 MMHG | BODY MASS INDEX: 26.76 KG/M2

## 2022-11-30 DIAGNOSIS — M79.605 LEFT LEG PAIN: ICD-10-CM

## 2022-11-30 DIAGNOSIS — I87.2 VENOUS INSUFFICIENCY OF LEFT LOWER EXTREMITY: Primary | ICD-10-CM

## 2022-11-30 PROCEDURE — 99213 OFFICE O/P EST LOW 20 MIN: CPT | Performed by: FAMILY MEDICINE

## 2022-11-30 ASSESSMENT — ANXIETY QUESTIONNAIRES
6. BECOMING EASILY ANNOYED OR IRRITABLE: NOT AT ALL
8. IF YOU CHECKED OFF ANY PROBLEMS, HOW DIFFICULT HAVE THESE MADE IT FOR YOU TO DO YOUR WORK, TAKE CARE OF THINGS AT HOME, OR GET ALONG WITH OTHER PEOPLE?: NOT DIFFICULT AT ALL
2. NOT BEING ABLE TO STOP OR CONTROL WORRYING: SEVERAL DAYS
GAD7 TOTAL SCORE: 5
GAD7 TOTAL SCORE: 5
IF YOU CHECKED OFF ANY PROBLEMS ON THIS QUESTIONNAIRE, HOW DIFFICULT HAVE THESE PROBLEMS MADE IT FOR YOU TO DO YOUR WORK, TAKE CARE OF THINGS AT HOME, OR GET ALONG WITH OTHER PEOPLE: NOT DIFFICULT AT ALL
3. WORRYING TOO MUCH ABOUT DIFFERENT THINGS: SEVERAL DAYS
7. FEELING AFRAID AS IF SOMETHING AWFUL MIGHT HAPPEN: SEVERAL DAYS
4. TROUBLE RELAXING: SEVERAL DAYS
5. BEING SO RESTLESS THAT IT IS HARD TO SIT STILL: NOT AT ALL
7. FEELING AFRAID AS IF SOMETHING AWFUL MIGHT HAPPEN: SEVERAL DAYS
1. FEELING NERVOUS, ANXIOUS, OR ON EDGE: SEVERAL DAYS
GAD7 TOTAL SCORE: 5

## 2022-11-30 ASSESSMENT — PATIENT HEALTH QUESTIONNAIRE - PHQ9
10. IF YOU CHECKED OFF ANY PROBLEMS, HOW DIFFICULT HAVE THESE PROBLEMS MADE IT FOR YOU TO DO YOUR WORK, TAKE CARE OF THINGS AT HOME, OR GET ALONG WITH OTHER PEOPLE: NOT DIFFICULT AT ALL
SUM OF ALL RESPONSES TO PHQ QUESTIONS 1-9: 7
SUM OF ALL RESPONSES TO PHQ QUESTIONS 1-9: 7

## 2022-11-30 ASSESSMENT — PAIN SCALES - GENERAL: PAINLEVEL: NO PAIN (0)

## 2022-11-30 NOTE — NURSING NOTE
"Chief Complaint   Patient presents with     Derm Problem     Lump on left leg        Initial /80   Pulse 81   Temp 96.8  F (36  C) (Tympanic)   Resp 16   Wt 79.8 kg (176 lb)   LMP  (LMP Unknown)   SpO2 100%   BMI 26.76 kg/m   Estimated body mass index is 26.76 kg/m  as calculated from the following:    Height as of 6/15/22: 1.727 m (5' 8\").    Weight as of this encounter: 79.8 kg (176 lb).  Medication Reconciliation: complete    Maria Teresa Lama LPN     Advanced Care Directive reviewed.     "

## 2022-11-30 NOTE — LETTER
My Depression Action Plan  Name: Luly Freeman   Date of Birth 1965  Date: 11/30/2022    My doctor: Estella Gamble   My clinic: [facility]  [address]  [place] [id number] [phone]          GREEN    ZONE   Good Control    What it looks like:     Things are going generally well. You have normal ups and downs. You may even feel depressed from time to time, but bad moods usually last less than a day.   What you need to do:  1. Continue to care for yourself (see self care plan)  2. Check your depression survival kit and update it as needed  3. Follow your physician s recommendations including any medication.  4. Do not stop taking medication unless you consult with your physician first.           YELLOW         ZONE Getting Worse    What it looks like:     Depression is starting to interfere with your life.     It may be hard to get out of bed; you may be starting to isolate yourself from others.    Symptoms of depression are starting to last most all day and this has happened for several days.     You may have suicidal thoughts but they are not constant.   What you need to do:     1. Call your care team. Your response to treatment will improve if you keep your care team informed of your progress. Yellow periods are signs an adjustment may need to be made.     2. Continue your self-care.  Just get dressed and ready for the day.  Don't give yourself time to talk yourself out of it.    3. Talk to someone in your support network.    4. Open up your Depression Self-Care Plan/Wellness Kit.           RED    ZONE Medical Alert - Get Help    What it looks like:     Depression is seriously interfering with your life.     You may experience these or other symptoms: You can t get out of bed most days, can t work or engage in other necessary activities, you have trouble taking care of basic hygiene, or basic responsibilities, thoughts of suicide or death that will not go  away, self-injurious behavior.     What you need to do:  1. Call your care team and request a same-day appointment. If they are not available (weekends or after hours) call your local crisis line, emergency room or 911.          Depression Self-Care Plan / Wellness Kit    Many people find that medication and therapy are helpful treatments for managing depression. In addition, making small changes to your everyday life can help to boost your mood and improve your wellbeing. Below are some tips for you to consider. Be sure to talk with your medical provider and/or behavioral health consultant if your symptoms are worsening or not improving.     Sleep   Sleep hygiene  means all of the habits that support good, restful sleep. It includes maintaining a consistent bedtime and wake time, using your bedroom only for sleeping or sex, and keeping the bedroom dark and free of distractions like a computer, smartphone, or television.     Develop a Healthy Routine  Maintain good hygiene. Get out of bed in the morning, make your bed, brush your teeth, take a shower, and get dressed. Don t spend too much time viewing media that makes you feel stressed. Find time to relax each day.    Exercise  Get some form of exercise every day. This will help reduce pain and release endorphins, the  feel good  chemicals in your brain. It can be as simple as just going for a walk or doing some gardening, anything that will get you moving.      Diet  Strive to eat healthy foods, including fruits and vegetables. Drink plenty of water. Avoid excessive sugar, caffeine, alcohol, and other mood-altering substances.     Stay Connected with Others  Stay in touch with friends and family members.    Manage Your Mood  Try deep breathing, massage therapy, biofeedback, or meditation. Take part in fun activities when you can. Try to find something to smile about each day.     Psychotherapy  Be open to working with a therapist if your provider recommends it.      Medication  Be sure to take your medication as prescribed. Most anti-depressants need to be taken every day. It usually takes several weeks for medications to work. Not all medicines work for all people. It is important to follow-up with your provider to make sure you have a treatment plan that is working for you. Do not stop your medication abruptly without first discussing it with your provider.    Crisis Resources   These hotlines are for both adults and children. They and are open 24 hours a day, 7 days a week unless noted otherwise.      National Suicide Prevention Lifeline   988 or 6-235-661-OUHX (7441)      Crisis Text Line    www.crisistextline.org  Text HOME to 328911 from anywhere in the [place], anytime, about any type of crisis. A live, trained crisis counselor will receive the text and respond quickly.      Ger Lifeline for LGBTQ Youth  A national crisis intervention and suicide lifeline for LGBTQ youth under 25. Provides a safe place to talk without judgement. Call 1-100.369.8095; text START to 538351 or visit www.thetrevorproject.org to talk to a trained counselor.      For [facility] crisis numbers, visit the [facility] website at:  https://[place].gov/dhs/people-we-serve/adults/health-care/mental-health/resources/crisis-contacts.jsp

## 2022-12-28 ENCOUNTER — MYC MEDICAL ADVICE (OUTPATIENT)
Dept: FAMILY MEDICINE | Facility: OTHER | Age: 57
End: 2022-12-28

## 2023-01-18 ENCOUNTER — TELEPHONE (OUTPATIENT)
Dept: FAMILY MEDICINE | Facility: OTHER | Age: 58
End: 2023-01-18
Payer: COMMERCIAL

## 2023-01-18 NOTE — TELEPHONE ENCOUNTER
Ember from [facility] notified.  Maria Teresa Lama, RICCO  1/18/2023  2:18 PM

## 2023-01-18 NOTE — TELEPHONE ENCOUNTER
OK to change US order for venous insufficiency exam.  Estella Gamble, DO on 1/18/2023 at 12:23 PM

## 2023-01-18 NOTE — TELEPHONE ENCOUNTER
[facility] in [place] needs verbal consent to adjust ultrasound order.  Order needed is for venous insufficiency studyto diagnose varicose veins, but order received was for vein mapping.  Please call [phone] to clarify order.    Celena Castellano on 1/18/2023 at 11:41 AM;

## 2023-01-30 PROCEDURE — 88305 TISSUE EXAM BY PATHOLOGIST: CPT | Mod: 26 | Performed by: DERMATOLOGY

## 2023-01-30 PROCEDURE — 88305 TISSUE EXAM BY PATHOLOGIST: CPT | Mod: TC,ORL | Performed by: DERMATOLOGY

## 2023-02-01 ENCOUNTER — LAB REQUISITION (OUTPATIENT)
Dept: LAB | Facility: CLINIC | Age: 58
End: 2023-02-01
Payer: COMMERCIAL

## 2023-02-01 DIAGNOSIS — D48.5 NEOPLASM OF UNCERTAIN BEHAVIOR OF SKIN: ICD-10-CM

## 2023-02-02 LAB
PATH REPORT.COMMENTS IMP SPEC: NORMAL
PATH REPORT.FINAL DX SPEC: NORMAL
PATH REPORT.GROSS SPEC: NORMAL
PATH REPORT.MICROSCOPIC SPEC OTHER STN: NORMAL
PATH REPORT.RELEVANT HX SPEC: NORMAL

## 2023-02-10 ENCOUNTER — HOSPITAL ENCOUNTER (OUTPATIENT)
Dept: ULTRASOUND IMAGING | Facility: OTHER | Age: 58
Discharge: HOME | End: 2023-02-10
Attending: FAMILY MEDICINE | Admitting: FAMILY MEDICINE
Payer: COMMERCIAL

## 2023-02-10 DIAGNOSIS — I87.2 VENOUS INSUFFICIENCY OF LEFT LOWER EXTREMITY: ICD-10-CM

## 2023-02-10 DIAGNOSIS — M79.605 LEFT LEG PAIN: ICD-10-CM

## 2023-02-10 PROCEDURE — 93971 EXTREMITY STUDY: CPT | Mod: LT

## 2023-02-15 ENCOUNTER — TELEPHONE (OUTPATIENT)
Dept: ULTRASOUND IMAGING | Facility: OTHER | Age: 58
End: 2023-02-15
Payer: COMMERCIAL

## 2023-02-15 DIAGNOSIS — I83.892 VARICOSE VEINS OF LEG WITH SWELLING, LEFT: Primary | ICD-10-CM

## 2023-02-15 NOTE — TELEPHONE ENCOUNTER
Compression stocking use > or = 3 mos? Yes  Has thigh high stockings in hand?   No, ordered today  Stocking order placed?   Yes, 2/15/23  Mapping complete?    Yes, 2/10/23  Clinical information printed?   Yes   Request for prior auth send to [facility]?  Yes, 2/15/23  Auth received back from [facility]?  No  Approved?     Pending request  PA required?     Pending request    Zahira TEAGUE RN  Imaging Department  Office: [phone]  Cell: [phone]   F [phone]  Imaging Schedulers: [phone]

## 2023-03-06 DIAGNOSIS — N95.1 MENOPAUSAL SYNDROME (HOT FLASHES): ICD-10-CM

## 2023-03-07 RX ORDER — ESTRADIOL 0.5 MG/1
TABLET ORAL
Qty: 90 TABLET | Refills: 3 | OUTPATIENT
Start: 2023-03-07

## 2023-03-07 NOTE — TELEPHONE ENCOUNTER
Last Office Visit: 11/3/22  Future Office Visit: 3/8/23    Medication has not been filled by a GI provider. Patient sees PCP tomorrow where refill request will be addressed    Corinne R Thayer, RN on 3/7/2023 at 9:17 AM\    Requested Prescriptions   Pending Prescriptions Disp Refills     estradiol (ESTRACE) 0.5 MG tablet 90 tablet 3     Sig: TAKE 1 TABLET(0.5 MG) BY MOUTH DAILY       Hormone Replacement Therapy Passed - 3/6/2023  4:16 PM        Passed - Blood pressure under 140/90 in past 12 months     BP Readings from Last 3 Encounters:   11/30/22 128/80   06/15/22 114/68   12/30/21 104/70                 Complex Repair And Single Advancement Flap Text: The defect edges were debeveled with a #15 scalpel blade.  The primary defect was closed partially with a complex linear closure.  Given the location of the remaining defect, shape of the defect and the proximity to free margins a single advancement flap was deemed most appropriate for complete closure of the defect.  Using a sterile surgical marker, an appropriate advancement flap was drawn incorporating the defect and placing the expected incisions within the relaxed skin tension lines where possible.    The area thus outlined was incised deep to adipose tissue with a #15 scalpel blade.  The skin margins were undermined to an appropriate distance in all directions utilizing iris scissors.

## 2023-03-07 NOTE — PROGRESS NOTES
ANNUAL PHYSICAL - FEMALE  Family Practice    HPI: Luly presents for a yearly exam.  Concerns include:  1. Still getting some hot flashes, but also attempting to wean off her estrogen; would prefer not to be on anything if she can help it.  2. Had veins evaluated with vein mapping in 2/2023 and will likely have RFA of the L greater saphenous soon.    Answers for HPI/ROS submitted by the patient on 3/8/2023  If you checked off any problems, how difficult have these problems made it for you to do your work, take care of things at home, or get along with other people?: Not difficult at all  PHQ9 TOTAL SCORE: 6  SIMON 7 TOTAL SCORE: 8  What is the reason for your visit today? : Preventative care  On average, how many sweetened beverages do you drink each day (Examples: soda, juice, sweet tea, etc.  Do NOT count diet or artificially sweetened beverages)?: 1  How many minutes a day do you exercise enough to make your heart beat faster?: 20 to 29  How many days a week do you exercise enough to make your heart beat faster?: 4  How many days per week do you miss taking your medication?: 0    No LMP recorded (lmp unknown). Patient has had a hysterectomy.   Contraception: NA, s/p partial hysterectomy.  On HRT.  Risk for STI?: No  Last pap: 6/5/2017, neg co-testing. DUE.  Any hx of abnormal paps:  No  FH of early CA?: pancreatic cancer  Cholesterol/DM concerns/screening: Normal lipids and glucose in 12/2021.  Tobacco?: No  Calcium intake: No  DEXA: None prior  Last mammo: Had completed today; results pending.   Colon screening: colonoscopy 10/30/2017, normal.  Due 2027.  Immunizations: Tdap 8/7/2017 - DUE; flu declines; Shingrix complete; PNA @ 65/66  Has had Covid series; candidate for booster.      Patient Active Problem List   Diagnosis     Multiple respiratory allergies     ACP (advance care planning)     Recurrent major depressive disorder, in remission (H)       Past Medical History:   Diagnosis Date     Allergic rhinitis  2011     Depressive disorder  ??     Lump or mass in breast 2007     PONV (postoperative nausea and vomiting)      Recurrent sinusitis 2011     Varicose veins 2011       Past Surgical History:   Procedure Laterality Date     ABDOMEN SURGERY  2011     BREAST SURGERY  3/2007     BUNIONECTOMY Right 10/22/2018    Procedure: RIGHT FOOT 1ST METATARSAL DISTAL CHEVERON OSTEOTOMY AND LATERAL SOFT FISSUE RELEASE;  Surgeon: Netta Soni DPM;  Location: [facility]     BUNIONECTOMY RT/LT  2008     COLONOSCOPY N/A 10/30/2017    Procedure: COLONOSCOPY;  WHOLE COLON COLONOSCOPY ;  Surgeon: Erma Jenkins MD;  Location: [facility] OR     ENT SURGERY  10/2013    nose surgery, Dr. Elliott     EXCISE MASS LOWER EXTREMITY Right 2017    Procedure: EXCISE MASS LOWER EXTREMITY;  EXCISION SOFT TISSUE MASS RIGHT LOWER LEG;  Surgeon: Erma Jenkins MD;  Location: [facility] OR     EYE SURGERY  2018    Lasik surgery            GYN SURGERY      partial hysterectomy     laparoscopic supracervical hysterectomy      Fibroids, ovaries intact     Left Breast Bx  3/2007    Fibrocystic breast disease     PE tube placement       ventilation tube, left         Social History     Socioeconomic History     Marital status:    Occupational History     Occupation: cosmotologist     Comment: Full-time   Tobacco Use     Smoking status: Never     Smokeless tobacco: Never     Tobacco comments:     no passive exposure   Vaping Use     Vaping Use: Never used   Substance and Sexual Activity     Alcohol use: Yes     Comment: occasionally     Drug use: No     Sexual activity: Yes     Partners: Male     Birth control/protection: Female Surgical   Other Topics Concern     Caffeine Concern Yes     Comment: coffee, 1 cup daily     Parent/sibling w/ CABG, MI or angioplasty before 65F 55M? No       Family History   Problem Relation Age of Onset     No Known Problems Mother      C.A.D. Father      Thyroid Disease  Father         nodule; surgical removal     Thyroid Disease Sister      Neurologic Disorder Brother         unknown     Other - See Comments Maternal Grandfather         possible MI COD     Thyroid Disease Paternal Grandmother      Other - See Comments Paternal Grandfather         possible MI COD     Other Cancer Cousin         Pancreatic cancer; 6 month     Other Cancer Maternal Aunt         pancreatic cancer; on year 11 (2022)     Diabetes No family hx of      Cerebrovascular Disease No family hx of      Other Cancer No family hx of        Current Outpatient Medications   Medication Sig Dispense Refill     estradiol (ESTRACE) 0.5 MG tablet TAKE 1 TABLET(0.5 MG) BY MOUTH DAILY 90 tablet 3     triamcinolone (KENALOG) 0.1 % external cream Apply topically 2 times daily 45 g 1        REVIEW OF SYSTEMS:  Refer to HPI; all other systems reviewed and negative.    PHYSICAL EXAM:  LMP  (LMP Unknown)   CONSTITUTIONAL:  Alert, cooperative, NAD.  EYES: No scleral icterus.  PERRLA.  Conjunctiva clear.  ENT/MOUTH: External ears and nose normal.  TMs normal.  Moist mucous membranes. Oropharynx clear.    ENDO: No thyromegaly or thyroid nodules.  LYMPH:  No cervical or supraclavicular LA.    BREASTS: deferred for mammogram  CARDIOVASCULAR: Regular, S1, S2.  No S3 or S4.  No murmur/gallop/rub.  No peripheral edema.  RESPIRATORY: CTA bilaterally, no wheezes, rhonchi or rales.  GI: Bowel sounds wnl.  Soft, nontender, non-distended.  No masses or HSM.  No rebound or guarding.  : Vulva: normal, no lesions or discharge  Urethral meatus: normal size and location, no lesions or discharge  Urethra: no tenderness or masses  Bladder: no fullness or tenderness  Vagina: normal appearance, no abnormal discharge, no lesions.  No evidence of cystocele or rectocele.  Cervix: normal appearance, no lesions, no abnormal discharge.  Uterus: normal size and position, mobile, non-tender  Adnexa: nopalpable masses bilaterally. No cervical motion  tenderness.  Pap smear obtained: yes  MSKEL: Grossly normal ROM.  No clubbing.  INTEGUMENTARY:Warm, dry.  No rash noted on exposed skin.  NEUROLOGIC: Facies symmetric.  Grossly normal movement and tone.  No tremor.  PSYCHIATRIC: Affect normal.  Speech fluent.      PHQ 12/30/2021 6/12/2022 11/30/2022   PHQ-9 Total Score 3 9 7   Q9: Thoughts of better off dead/self-harm past 2 weeks Not at all Several days Not at all   F/U: Thoughts of suicide or self-harm - No -   F/U: Safety concerns - No -     SIMON-7 SCORE 12/30/2021 6/12/2022 11/30/2022   Total Score - 5 (mild anxiety) 5 (mild anxiety)   Total Score 3 5 5       No results found for any visits on 03/08/23.    ASSESSMENT/PLAN:  1. Routine history and physical examination of adult  - Pap Screen with HPV - recommended age 30 - 65 years  - HPV High Risk Types DNA Cervical    2. Menopausal syndrome (hot flashes)  Chronic, waxes and wanes.  Will continue to monitor dose at same; may increase if becoming more unbearable.  - estradiol (ESTRACE) 0.5 MG tablet; TAKE 1 TABLET(0.5 MG) BY MOUTH DAILY  Dispense: 90 tablet; Refill: 4    3. Cervical cancer screening  Collected/updated today.  - Pap Screen with HPV - recommended age 30 - 65 years  - HPV High Risk Types DNA Cervical    4. Colon cancer screening  Last done: 10/30/2017; currently UTD.     5. Need for diphtheria-tetanus-pertussis (Tdap) vaccine  Last done: 8/7/2017; currently UTD.    6. Venous insufficiency of LLE  Debating; but likely will plan for RFA upcoming.    Relevant cancer screening discussed.    Counseled on healthy diet, Calcium and vitamin D intake, and exercise.    Estella Gamble, [facility]

## 2023-03-08 ENCOUNTER — OFFICE VISIT (OUTPATIENT)
Dept: FAMILY MEDICINE | Facility: OTHER | Age: 58
End: 2023-03-08
Attending: FAMILY MEDICINE
Payer: COMMERCIAL

## 2023-03-08 ENCOUNTER — ANCILLARY PROCEDURE (OUTPATIENT)
Dept: MAMMOGRAPHY | Facility: OTHER | Age: 58
End: 2023-03-08
Attending: FAMILY MEDICINE
Payer: COMMERCIAL

## 2023-03-08 ENCOUNTER — TELEPHONE (OUTPATIENT)
Dept: MAMMOGRAPHY | Facility: OTHER | Age: 58
End: 2023-03-08

## 2023-03-08 VITALS
BODY MASS INDEX: 26.38 KG/M2 | TEMPERATURE: 96.8 F | HEART RATE: 81 BPM | RESPIRATION RATE: 16 BRPM | WEIGHT: 173.5 LBS | SYSTOLIC BLOOD PRESSURE: 124 MMHG | OXYGEN SATURATION: 100 % | DIASTOLIC BLOOD PRESSURE: 72 MMHG

## 2023-03-08 DIAGNOSIS — Z12.11 COLON CANCER SCREENING: ICD-10-CM

## 2023-03-08 DIAGNOSIS — Z12.31 VISIT FOR SCREENING MAMMOGRAM: ICD-10-CM

## 2023-03-08 DIAGNOSIS — I87.2 VENOUS INSUFFICIENCY OF LEFT LOWER EXTREMITY: ICD-10-CM

## 2023-03-08 DIAGNOSIS — N95.1 MENOPAUSAL SYNDROME (HOT FLASHES): ICD-10-CM

## 2023-03-08 DIAGNOSIS — Z23 NEED FOR DIPHTHERIA-TETANUS-PERTUSSIS (TDAP) VACCINE: ICD-10-CM

## 2023-03-08 DIAGNOSIS — Z12.4 CERVICAL CANCER SCREENING: ICD-10-CM

## 2023-03-08 DIAGNOSIS — Z00.00 ROUTINE HISTORY AND PHYSICAL EXAMINATION OF ADULT: Primary | ICD-10-CM

## 2023-03-08 PROCEDURE — 87624 HPV HI-RISK TYP POOLED RSLT: CPT | Mod: ZL | Performed by: FAMILY MEDICINE

## 2023-03-08 PROCEDURE — 99396 PREV VISIT EST AGE 40-64: CPT | Performed by: FAMILY MEDICINE

## 2023-03-08 PROCEDURE — 77063 BREAST TOMOSYNTHESIS BI: CPT | Mod: TC | Performed by: RADIOLOGY

## 2023-03-08 PROCEDURE — 77067 SCR MAMMO BI INCL CAD: CPT | Mod: TC | Performed by: RADIOLOGY

## 2023-03-08 PROCEDURE — G0123 SCREEN CERV/VAG THIN LAYER: HCPCS | Performed by: FAMILY MEDICINE

## 2023-03-08 RX ORDER — ESTRADIOL 0.5 MG/1
TABLET ORAL
Qty: 90 TABLET | Refills: 4 | Status: SHIPPED | OUTPATIENT
Start: 2023-03-08 | End: 2023-09-21

## 2023-03-08 ASSESSMENT — ANXIETY QUESTIONNAIRES
6. BECOMING EASILY ANNOYED OR IRRITABLE: SEVERAL DAYS
1. FEELING NERVOUS, ANXIOUS, OR ON EDGE: MORE THAN HALF THE DAYS
7. FEELING AFRAID AS IF SOMETHING AWFUL MIGHT HAPPEN: SEVERAL DAYS
3. WORRYING TOO MUCH ABOUT DIFFERENT THINGS: SEVERAL DAYS
GAD7 TOTAL SCORE: 8
5. BEING SO RESTLESS THAT IT IS HARD TO SIT STILL: SEVERAL DAYS
4. TROUBLE RELAXING: SEVERAL DAYS
GAD7 TOTAL SCORE: 8
2. NOT BEING ABLE TO STOP OR CONTROL WORRYING: SEVERAL DAYS
GAD7 TOTAL SCORE: 8
7. FEELING AFRAID AS IF SOMETHING AWFUL MIGHT HAPPEN: SEVERAL DAYS

## 2023-03-08 ASSESSMENT — PAIN SCALES - GENERAL: PAINLEVEL: NO PAIN (0)

## 2023-03-08 ASSESSMENT — PATIENT HEALTH QUESTIONNAIRE - PHQ9
10. IF YOU CHECKED OFF ANY PROBLEMS, HOW DIFFICULT HAVE THESE PROBLEMS MADE IT FOR YOU TO DO YOUR WORK, TAKE CARE OF THINGS AT HOME, OR GET ALONG WITH OTHER PEOPLE: NOT DIFFICULT AT ALL
SUM OF ALL RESPONSES TO PHQ QUESTIONS 1-9: 6
SUM OF ALL RESPONSES TO PHQ QUESTIONS 1-9: 6

## 2023-03-08 NOTE — NURSING NOTE
"Chief Complaint   Patient presents with     Gyn Exam     Leg Problem       Initial /72   Pulse 81   Temp 96.8  F (36  C) (Tympanic)   Resp 16   Wt 78.7 kg (173 lb 8 oz)   LMP  (LMP Unknown)   SpO2 100%   BMI 26.38 kg/m   Estimated body mass index is 26.38 kg/m  as calculated from the following:    Height as of 6/15/22: 1.727 m (5' 8\").    Weight as of this encounter: 78.7 kg (173 lb 8 oz).  Medication Reconciliation: complete    Maria Teresa Lama LPN     [facility] Directive reviewed.     "

## 2023-03-10 LAB
BKR LAB AP GYN ADEQUACY: NORMAL
BKR LAB AP GYN INTERPRETATION: NORMAL
BKR LAB AP HPV REFLEX: NORMAL
BKR LAB AP PREVIOUS ABNORMAL: NORMAL
PATH REPORT.COMMENTS IMP SPEC: NORMAL
PATH REPORT.COMMENTS IMP SPEC: NORMAL
PATH REPORT.RELEVANT HX SPEC: NORMAL

## 2023-03-13 DIAGNOSIS — N95.1 MENOPAUSAL SYNDROME (HOT FLASHES): ICD-10-CM

## 2023-03-13 RX ORDER — ESTRADIOL 0.5 MG/1
TABLET ORAL
Qty: 90 TABLET | Refills: 4 | OUTPATIENT
Start: 2023-03-13

## 2023-03-13 NOTE — TELEPHONE ENCOUNTER
estradiol (ESTRACE) 0.5 MG tablet 90 tablet 4 3/8/2023  No   Sig: TAKE 1 TABLET(0.5 MG) BY MOUTH DAILY     Refilled on [facility] [place]    Already refilled  Katt Mahajan RN on 3/13/2023 at 11:25 AM

## 2023-03-13 NOTE — TELEPHONE ENCOUNTER
3rd fax request.     Refused 3/7, as Pt had appointment 3/8, however it wasn't addressed.    Lorrie Lockett RN .............. 3/13/2023  10:33 AM

## 2023-03-14 LAB
HUMAN PAPILLOMA VIRUS 16 DNA: NEGATIVE
HUMAN PAPILLOMA VIRUS 18 DNA: NEGATIVE
HUMAN PAPILLOMA VIRUS FINAL DIAGNOSIS: NORMAL
HUMAN PAPILLOMA VIRUS OTHER HR: NEGATIVE

## 2023-03-23 PROCEDURE — 88305 TISSUE EXAM BY PATHOLOGIST: CPT | Mod: TC,ORL | Performed by: DERMATOLOGY

## 2023-03-23 PROCEDURE — 88305 TISSUE EXAM BY PATHOLOGIST: CPT | Mod: 26 | Performed by: DERMATOLOGY

## 2023-03-24 ENCOUNTER — TELEPHONE (OUTPATIENT)
Dept: ULTRASOUND IMAGING | Facility: OTHER | Age: 58
End: 2023-03-24
Payer: COMMERCIAL

## 2023-03-24 NOTE — TELEPHONE ENCOUNTER
Call placed to patient to inform patient we received authorization for her to have the vein ablation. I called also to inquire whether she would be able to come for vein ablation on 3/28/23 - as we have another patient whose insurance is pending and may need to be rescheduled. Awaiting return phone call from patient.

## 2023-03-28 ENCOUNTER — LAB REQUISITION (OUTPATIENT)
Dept: LAB | Facility: CLINIC | Age: 58
End: 2023-03-28
Payer: COMMERCIAL

## 2023-03-28 DIAGNOSIS — D48.5 NEOPLASM OF UNCERTAIN BEHAVIOR OF SKIN: ICD-10-CM

## 2023-03-30 LAB
PATH REPORT.COMMENTS IMP SPEC: ABNORMAL
PATH REPORT.COMMENTS IMP SPEC: YES
PATH REPORT.FINAL DX SPEC: ABNORMAL
PATH REPORT.GROSS SPEC: ABNORMAL
PATH REPORT.MICROSCOPIC SPEC OTHER STN: ABNORMAL
PATH REPORT.RELEVANT HX SPEC: ABNORMAL

## 2023-04-20 ENCOUNTER — TELEPHONE (OUTPATIENT)
Dept: GENERAL RADIOLOGY | Facility: OTHER | Age: 58
End: 2023-04-20
Payer: COMMERCIAL

## 2023-04-20 NOTE — TELEPHONE ENCOUNTER
Contact made Yes.   Which attempt is this? #1.  Call date: 04/21/2023  Call time: 0900 AM  Name of procedure: ultrasound guided endovenous radiofrequency ablation of vein in left leg.  Procedure date verified: Yes, 04/27/2023.  Arrival time verified: 1145 AM   Facility location verified: Yes, instructed to enter through main clinic entrance, and proceed to Diagnostic Registration.  Plan to be here for 3-3.5 hours. Explain current visitor policy to patient.    Are you being treated for an infection (on antibiotics)? No. If yes, MUST discuss with radiologist and imaging nurse (procedure may need to be cancelled and rescheduled).      Previous sedation: Yes  Complications of sedation?  No  We use an oral sedative called valium. It is optional and the patient can decide the day of the procedure if they would like to receive it.  They will need a  if they take the oral sedative. The  does not need to wait in the clinic, he/she can come back to pick the patient up following the procedure. No H&P is required for oral sedation.    Any anticoagulants or blood thinners? No, None.  If yes, patient was instructed to follow MD orders for stopping anticoagulants or blood thinners. Labs needed (typically only if on blood thinners)? Not applicable.     Any allergies to chlorhexidine/chloraprep, lidocaine, sodium bicarbonate, ibuprofen, valium, or nitroglycercin paste? Had some skin cancer spots removed from face last week, lidocaine was injected for that procedure and patient had to take benadryl for 3 days after related to hives, will discuss with radiologist. Patient is unsure if it was for sure related to the lidocaine, but wanted to mention it.     No dietary restrictions. Patient may eat/drink prior to procedure.  Patient was encouraged to drink a lot of fluids day prior to procedure.    Patient has received compression stockings. If not yet received, instruct patient to call [facility] at [phone]  [phone].    Instructions given: expectations for procedure, plan to be here for 3-3.5 hours, need to hydrate well, need for designated , and need to bring compression stockings to procedure.  Patient states an understanding of pre-procedure instructions.  Preprocedure teaching completed: Yes  Method: verbal per phone.  People present for teaching: Patient  Response to teaching: patient demonstrates understanding of procedure, conscious sedation and plan of care, as well as post-procedure instructions.  Transportation after procedure: Yes: will have   Any questions or patient concerns? No

## 2023-04-25 ENCOUNTER — TELEPHONE (OUTPATIENT)
Dept: GENERAL RADIOLOGY | Facility: OTHER | Age: 58
End: 2023-04-25

## 2023-04-25 DIAGNOSIS — T78.40XS ALLERGIC REACTION, SEQUELA: Primary | ICD-10-CM

## 2023-04-25 DIAGNOSIS — T41.3X5S: ICD-10-CM

## 2023-04-25 NOTE — TELEPHONE ENCOUNTER
"Hello,     Patient was scheduled for vein ablation Thursday 4/27.. which we canceled..    When doing the pre-procedure call with patient for her Vein Ablation, she noted a potential allergy to lidocaine, reported that 2 years ago she felt \"flush\" with her shoulder injections and thought maybe It was the steroid, this lasted for the day.     More recently.. noted that after removal of skin cancer spots on her face about a month ago, she felt \"warm\", had hard spots where the spots were removed and broke out in all over itchy rash and had to take benadryl for 3 days. Discussed with the radiologist and he didn't feel comfortable proceeding until we know more about whether this is a true allergy to lidocaine or not. We use a lot up to 500mls of a lidocaine mixture for the radiofrequency ablations up and down the whole length of the leg.     Can you please contact the patient regarding options to figure out if she has an allergy to lidocaine? I told her I'd message you and have you contact her.     Of note, the radiologists at [facility] are in the process of training to start using a new product here for vein ablations that uses a glue.. I told the patient we would keep in touch with her regarding this option if/when we are up and running with this product. Hopeful it will be the next couple of months...     You can reach me at [phone] if you have any questions ! Thank you ! Annie Mcdonald RN on 4/25/2023 at 9:57 AM    "

## 2023-04-26 NOTE — TELEPHONE ENCOUNTER
I will have to look into options of how to confirm or discount a lidocaine allergy.  Will continue working on when I return to clinic next week.  Estella Gamble, DO

## 2023-05-05 NOTE — TELEPHONE ENCOUNTER
Please notify patient:    Referred to Allergy for possible skin/patch testing to be completed to determine allergy vs adverse reaction to lidocaine.    Schedulers to contact and set up appointment.      Estella Gamble, DO

## 2023-07-10 ENCOUNTER — TRANSFERRED RECORDS (OUTPATIENT)
Dept: HEALTH INFORMATION MANAGEMENT | Facility: OTHER | Age: 58
End: 2023-07-10
Payer: COMMERCIAL

## 2023-07-12 ENCOUNTER — TELEPHONE (OUTPATIENT)
Dept: GENERAL RADIOLOGY | Facility: OTHER | Age: 58
End: 2023-07-12
Payer: COMMERCIAL

## 2023-07-12 NOTE — TELEPHONE ENCOUNTER
Per quick review of [facility] site, patient's mapping will  8/10/23. We don't have openings for ablation prior to the 8/10 date. Message left on patient's self-identifying voicemail.

## 2023-07-13 NOTE — TELEPHONE ENCOUNTER
Spoke with patient about the need for new vein mapping as hers will  8/10 and we don't have any ablation openings before that date available. Patient very understanding about this. She would like to get that scheduled. Offered , pending scheduling. She would like the radiofrequency ablation done in September or October. Thursday afternoons work best. Information passed on to schedulers. Awaiting return response.

## 2023-07-20 ENCOUNTER — TELEPHONE (OUTPATIENT)
Dept: GENERAL RADIOLOGY | Facility: OTHER | Age: 58
End: 2023-07-20
Payer: COMMERCIAL

## 2023-07-20 NOTE — TELEPHONE ENCOUNTER
Patient scheduled for 8/18 vein mapping. She will discuss RF vs VenaSeal with the radiologist at this appt. Following her decision, prior auth will be re-sent to her insurance.

## 2023-07-20 NOTE — TELEPHONE ENCOUNTER
Call placed to patient to inform her that we have new ablation availability on 8/2/23. Awaiting return phone call.

## 2023-07-26 ENCOUNTER — TELEPHONE (OUTPATIENT)
Dept: ULTRASOUND IMAGING | Facility: OTHER | Age: 58
End: 2023-07-26
Payer: COMMERCIAL

## 2023-07-26 NOTE — TELEPHONE ENCOUNTER
"Contact made Yes.   Which attempt is this? #1.  Call date: 07/26/23  Call time: 1100 AM  Name of procedure: ultrasound guided endovenous radiofrequency ablation of vein in left leg.  Procedure date verified: Yes, 08/02/23.  Arrival time verified: 1215 PM   Facility location verified: Yes, instructed to enter through main clinic entrance, wear a mask, and proceed to Diagnostic Registration.  Plan to be here for 3-3.5 hours. Explain current visitor policy to patient.    Are you being treated for an infection (on antibiotics)? No. If yes, MUST discuss with radiologist and imaging nurse (procedure may need to be cancelled and rescheduled).     needed? No  Language: N/A    Previous sedation: Yes  Complications of sedation?  No, has taken valium for procedures in the past  We use an oral sedative called valium. It is optional and the patient can decide the day of the procedure if they would like to receive it.  Patient will need a  if they take the oral sedative. The  does not need to wait in the clinic, he/she can come back to pick the patient up following the procedure. No H&P is required for oral sedation.    Any anticoagulants or blood thinners? No, None.  If yes, patient was instructed to follow MD orders for stopping anticoagulants or blood thinners. Labs needed (if warfarin, must have same-day lab appt)? Not applicable. This procedure is considered \"LOW RISK\" per the [facility] Management of ANTITHROMBOTICS & NSAIDS GUIDELINES.    Anticoagulants (EXCEPT WARFARIN): do not withhold. If warfarin, advise patient and PCP of goal INR between 2-3. Must have ok from PCP to hold warfarin. Antiplatelets ORAL: do not withhold (includes aspirin, cilostazol, clopidogrel, dipyridamole, prasugrel, ticagrelor). NSAIDS: do not withhold.     Any allergies to chlorhexidine/chloraprep, lidocaine, sodium bicarbonate, ibuprofen, valium, or nitroglycercin paste? No, had work up for lidocaine allergy and does " not have an allergy If yes, MUST discuss with radiologist and imaging nurse before proceeding with procedure).     No Active Allergies    Current Outpatient Medications   Medication    estradiol (ESTRACE) 0.5 MG tablet    triamcinolone (KENALOG) 0.1 % external cream     No current facility-administered medications for this visit.       No dietary restrictions. Patient may eat/drink prior to procedure.  Patient was encouraged to drink a lot of fluids day prior to procedure.    Patient has received compression stockings. If not yet received, instruct patient to call [facility] at [phone].    Instructions given: expectations for procedure, plan to be here for 3-3.5 hours, need to hydrate well, need for designated , and need to bring compression stockings to procedure.  Patient states an understanding of pre-procedure instructions.  Preprocedure teaching completed: Yes  Method: verbal per phone.  People present for teaching: Patient  Response to teaching: patient demonstrates understanding of procedure, conscious sedation and plan of care, as well as post-procedure instructions.  Transportation after procedure: Yes:   Any questions or patient concerns? NO    If patient has questions you are unable to answer, please notify Imaging Nurse, and tell patient someone will follow-up with them.

## 2023-08-02 ENCOUNTER — HOSPITAL ENCOUNTER (OUTPATIENT)
Dept: GENERAL RADIOLOGY | Facility: OTHER | Age: 58
Discharge: HOME | End: 2023-08-02
Attending: RADIOLOGY | Admitting: RADIOLOGY
Payer: COMMERCIAL

## 2023-08-02 VITALS
DIASTOLIC BLOOD PRESSURE: 78 MMHG | RESPIRATION RATE: 12 BRPM | SYSTOLIC BLOOD PRESSURE: 112 MMHG | OXYGEN SATURATION: 96 % | TEMPERATURE: 98.2 F | HEART RATE: 72 BPM

## 2023-08-02 DIAGNOSIS — I83.892 VARICOSE VEINS OF LEFT LOWER EXTREMITY WITH OTHER COMPLICATIONS: ICD-10-CM

## 2023-08-02 PROCEDURE — 250N000009 HC RX 250: Performed by: STUDENT IN AN ORGANIZED HEALTH CARE EDUCATION/TRAINING PROGRAM

## 2023-08-02 PROCEDURE — 258N000003 HC RX IP 258 OP 636: Performed by: STUDENT IN AN ORGANIZED HEALTH CARE EDUCATION/TRAINING PROGRAM

## 2023-08-02 PROCEDURE — 250N000013 HC RX MED GY IP 250 OP 250 PS 637: Performed by: STUDENT IN AN ORGANIZED HEALTH CARE EDUCATION/TRAINING PROGRAM

## 2023-08-02 PROCEDURE — C1886 CATHETER, ABLATION: HCPCS

## 2023-08-02 RX ORDER — IBUPROFEN 400 MG/1
400 TABLET, FILM COATED ORAL ONCE
Status: COMPLETED | OUTPATIENT
Start: 2023-08-02 | End: 2023-08-02

## 2023-08-02 RX ORDER — DIAZEPAM 5 MG
5 TABLET ORAL ONCE
Status: COMPLETED | OUTPATIENT
Start: 2023-08-02 | End: 2023-08-02

## 2023-08-02 RX ADMIN — LIDOCAINE HYDROCHLORIDE 3 ML: 10 INJECTION, SOLUTION INFILTRATION; PERINEURAL at 13:14

## 2023-08-02 RX ADMIN — IBUPROFEN 400 MG: 400 TABLET, FILM COATED ORAL at 12:15

## 2023-08-02 RX ADMIN — DIAZEPAM 5 MG: 5 TABLET ORAL at 12:15

## 2023-08-02 RX ADMIN — LIDOCAINE HYDROCHLORIDE,EPINEPHRINE BITARTRATE: 10; .01 INJECTION, SOLUTION INFILTRATION; PERINEURAL at 13:14

## 2023-08-02 RX ADMIN — NITROGLYCERIN 15 MG: 20 OINTMENT TOPICAL at 12:30

## 2023-08-02 NOTE — PROGRESS NOTES
Prior to the start of the procedure, Left leg was cleansed with chlorhexidine in sterile fashion, then draped by rad tech. nitroglycerin paste was removed during cleansing of the leg.     Radiofrequency Ablation Procedural Information:     5 mg valium given pre-procedure. 400 mg ibuprofen given post-procedure.     Time out called by performing MD: Dr. Beard    Company: [employer]; Lot # [id number]    Catheter information: (element length) 7 cm x (overall length of catheter) 60 cm , (catheter diameter) 7 Nigerian    Micro Introducer: Synfora ClosureFast 7 Nigerian x 7 cm     Total treatment time: 3 min 0 sec    Number of cycles of treatment: 9    Length of vessel treated:  47.0 cm    Total lidocaine used: 3 mL    Total volume of tumescent used:  400 mL    Skin of extremity where heating pad was placed is WDL.      Direct pressure was held to left leg upon removal of the catheter.    Catheter insertion site was covered with sterile bandage.    Compression stocking placed on left leg.     Discharge instructions reviewed with patient. Patient verbalized understanding of discharge instructions. All questions answered.

## 2023-08-02 NOTE — DISCHARGE INSTRUCTIONS
Radiofrequency Ablation    Your follow-up appointment is 8/4/23 at 1:30 pm.  This exam will take approximately 45 minutes and you may drive yourself to this appointment.  Wear you compression stocking day and night until this appointment.  You may take them off to shower.      After your three day follow-up appointment, wear your stocking during the day and off at night for one week.    Activity:   *  Resume your normal activities today such as walking.   *  Walk at least three times per day for 20 minutes.  *  Avoid vigorous exercise or weight lifting for three days  *  No baths or hot tubs for one week  *  Avoid excessive sun or tanning booths  *  If you received Valium, avoid driving or drinking alcohol for 24 hours    Comfort:  *  You may use ice for the first 24 hours, only leave on for 20 minutes at a time    Diet:  *  Resume your normal diet    When to call your Physician:  *  Toes become discolored and numb  *  Excessive pain or increased pain with walking  *  Fever, chills, redness, drainage or warmth around your incision    It is normal to have bruising and tenderness.  You may also experience a pulling/or tugging sensation of your leg starting around the third or fourth day.      For questions, problems or concerns, contact [phone].

## 2023-08-04 ENCOUNTER — HOSPITAL ENCOUNTER (OUTPATIENT)
Dept: ULTRASOUND IMAGING | Facility: OTHER | Age: 58
Discharge: HOME | End: 2023-08-04
Attending: RADIOLOGY | Admitting: RADIOLOGY
Payer: COMMERCIAL

## 2023-08-04 DIAGNOSIS — I83.892 VARICOSE VEINS OF LEFT LOWER EXTREMITY WITH OTHER COMPLICATIONS: ICD-10-CM

## 2023-08-04 PROCEDURE — 93971 EXTREMITY STUDY: CPT | Mod: LT

## 2023-08-15 ENCOUNTER — TRANSFERRED RECORDS (OUTPATIENT)
Dept: HEALTH INFORMATION MANAGEMENT | Facility: CLINIC | Age: 58
End: 2023-08-15
Payer: COMMERCIAL

## 2023-08-28 ENCOUNTER — TELEPHONE (OUTPATIENT)
Dept: FAMILY MEDICINE | Facility: OTHER | Age: 58
End: 2023-08-28

## 2023-08-28 ENCOUNTER — HOSPITAL ENCOUNTER (OUTPATIENT)
Dept: ULTRASOUND IMAGING | Facility: OTHER | Age: 58
Discharge: HOME | End: 2023-08-28
Attending: RADIOLOGY | Admitting: RADIOLOGY
Payer: COMMERCIAL

## 2023-08-28 DIAGNOSIS — I83.892 VARICOSE VEINS OF LEFT LOWER EXTREMITY WITH OTHER COMPLICATIONS: ICD-10-CM

## 2023-08-28 PROCEDURE — 93971 EXTREMITY STUDY: CPT | Mod: LT

## 2023-08-28 NOTE — TELEPHONE ENCOUNTER
Patient had a left leg radiofrequency ablation on 8/2/23. She had a normal ablation follow-up appointment on 8/4/23. She was here today for her 4 week follow-up, with complaints of continuous aching in the left leg. Please see Dr. Cleveland's imaging report for her US lower extremity venous duplex left. He recommends she follow-up in clinic with you in 2 months and see how she is feeling (for documentation).  If she is still having problems, Dr. Cleveland would most likely suggest she have another mapping (which we would need orders for) - US LOWER EXTREMITY VENOUS MAPPING LEFT, to decide if there is another vessel refluxing that could be contributing to pain. Please let me or my partner Annie Delgadolake know if you have any questions. Thank you!!    Zahira TEAGUE RN  Imaging Department  Office: [phone]  Cell: [phone]   -[phone]  Imaging Schedulers: [phone]

## 2023-09-21 NOTE — TELEPHONE ENCOUNTER
I do not see that Luly is scheduled yet; can you contact for a follow up per message from Radiology?  Estella Gamble, DO

## 2023-10-06 ASSESSMENT — ANXIETY QUESTIONNAIRES
IF YOU CHECKED OFF ANY PROBLEMS ON THIS QUESTIONNAIRE, HOW DIFFICULT HAVE THESE PROBLEMS MADE IT FOR YOU TO DO YOUR WORK, TAKE CARE OF THINGS AT HOME, OR GET ALONG WITH OTHER PEOPLE: NOT DIFFICULT AT ALL
GAD7 TOTAL SCORE: 5
6. BECOMING EASILY ANNOYED OR IRRITABLE: NOT AT ALL
1. FEELING NERVOUS, ANXIOUS, OR ON EDGE: SEVERAL DAYS
7. FEELING AFRAID AS IF SOMETHING AWFUL MIGHT HAPPEN: NOT AT ALL
GAD7 TOTAL SCORE: 5
4. TROUBLE RELAXING: SEVERAL DAYS
3. WORRYING TOO MUCH ABOUT DIFFERENT THINGS: MORE THAN HALF THE DAYS
5. BEING SO RESTLESS THAT IT IS HARD TO SIT STILL: NOT AT ALL
2. NOT BEING ABLE TO STOP OR CONTROL WORRYING: SEVERAL DAYS

## 2023-10-12 NOTE — PROGRESS NOTES
Assessment & Plan     1. Venous insufficiency of left leg  2. Left leg pain  3. Status post laser ablation of incompetent vein  Would like to plan imaging for this region prior to further planned vein work up.  MR of L lower leg and L ankle was ordered.   Based on results; will need to proceed with repeat vein mapping and determine if there is another area of concern to target with ablation.  - MR Tibia Fibula Lower Leg Left wo Contrast; Future  - MR Ankle Left w/o Contrast; Future    MDM:  Ordering of each unique test    Follow up pending results of MR. Estella Gamble, [facility]      Subjective   Luly is a 58 year old, presenting for the following health issues:  RECHECK (Left leg radiofrequency ablation )        10/13/2023     2:46 PM   Additional Questions   Roomed by RICCO Morel   Accompanied by self       History of Present Illness       Reason for visit:  To figure out the ache in my leg    She eats 2-3 servings of fruits and vegetables daily.She consumes 0 sweetened beverage(s) daily.She exercises with enough effort to increase her heart rate 30 to 60 minutes per day.  She exercises with enough effort to increase her heart rate 3 or less days per week.   She is taking medications regularly.       Patient had a left leg radiofrequency ablation on 8/2/23. She had a normal ablation follow-up appointment on 8/4/23.  When she was seen on 8/28/23 for a 4-week follow up she was still having some vague continuous aching of the L leg.   She is here for evaluation further out from ablation.    If still having pain; the recommendation would be to repeat venous mapping to determine another source/vessel refluxing that would be contributing to symptoms.         Luly reports today: no significant change in leg pain.  Feels pressure in the anterior R distal shin and anterior ankle joint.  Like she just has to move her ankle around and she'll get a pop that will resolve the pressure  "feeling.  Doesn't affect her ability to walk/sleep.  Achy.   Has been wearing compression stockings daily at least since early to Mid Sept (~9/8/23).        Objective    /72   Pulse 81   Temp 97  F (36.1  C) (Tympanic)   Resp 14   Ht 1.727 m (5' 8\")   Wt 79 kg (174 lb 2 oz)   LMP 01/01/2011 (Approximate)   SpO2 98%   BMI 26.48 kg/m    Body mass index is 26.48 kg/m .  Physical Exam   GENERAL: healthy, alert and no distress  NECK: no adenopathy, no asymmetry, masses, or scars and thyroid normal to palpation  RESP: lungs clear to auscultation - no rales, rhonchi or wheezes  CV: regular rate and rhythm, normal S1 S2, no S3 or S4, no murmur, click or rub, no peripheral edema and peripheral pulses strong  MS: no gross musculoskeletal defects noted, no edema  SKIN: no suspicious lesions or rashes  NEURO: Normal strength and tone, mentation intact and speech normal    No results found for any visits on 10/13/23.    "

## 2023-10-13 ENCOUNTER — OFFICE VISIT (OUTPATIENT)
Dept: FAMILY MEDICINE | Facility: OTHER | Age: 58
End: 2023-10-13
Attending: FAMILY MEDICINE
Payer: COMMERCIAL

## 2023-10-13 VITALS
WEIGHT: 174.13 LBS | BODY MASS INDEX: 26.39 KG/M2 | RESPIRATION RATE: 14 BRPM | HEART RATE: 81 BPM | SYSTOLIC BLOOD PRESSURE: 104 MMHG | HEIGHT: 68 IN | DIASTOLIC BLOOD PRESSURE: 72 MMHG | TEMPERATURE: 97 F | OXYGEN SATURATION: 98 %

## 2023-10-13 DIAGNOSIS — Z98.890 STATUS POST LASER ABLATION OF INCOMPETENT VEIN: ICD-10-CM

## 2023-10-13 DIAGNOSIS — M79.605 LEFT LEG PAIN: Primary | ICD-10-CM

## 2023-10-13 DIAGNOSIS — I87.2 VENOUS INSUFFICIENCY OF LEFT LEG: ICD-10-CM

## 2023-10-13 PROCEDURE — 99213 OFFICE O/P EST LOW 20 MIN: CPT | Performed by: FAMILY MEDICINE

## 2023-10-13 ASSESSMENT — PATIENT HEALTH QUESTIONNAIRE - PHQ9
SUM OF ALL RESPONSES TO PHQ QUESTIONS 1-9: 3
10. IF YOU CHECKED OFF ANY PROBLEMS, HOW DIFFICULT HAVE THESE PROBLEMS MADE IT FOR YOU TO DO YOUR WORK, TAKE CARE OF THINGS AT HOME, OR GET ALONG WITH OTHER PEOPLE: NOT DIFFICULT AT ALL
SUM OF ALL RESPONSES TO PHQ QUESTIONS 1-9: 3

## 2023-10-13 ASSESSMENT — PAIN SCALES - GENERAL: PAINLEVEL: MILD PAIN (2)

## 2023-10-13 NOTE — NURSING NOTE
"Chief Complaint   Patient presents with    RECHECK     Left leg radiofrequency ablation        Initial /72   Pulse 81   Temp 97  F (36.1  C) (Tympanic)   Resp 14   Ht 1.727 m (5' 8\")   Wt 79 kg (174 lb 2 oz)   LMP 01/01/2011 (Approximate)   SpO2 98%   BMI 26.48 kg/m   Estimated body mass index is 26.48 kg/m  as calculated from the following:    Height as of this encounter: 1.727 m (5' 8\").    Weight as of this encounter: 79 kg (174 lb 2 oz).  Medication Review: complete    The next two questions are to help us understand your food security.  If you are feeling you need any assistance in this area, we have resources available to support you today.          10/6/2023   SDOH- Food Insecurity   Within the past 12 months, did you worry that your food would run out before you got money to buy more? N   Within the past 12 months, did the food you bought just not last and you didn t have money to get more? N         Health Care Directive:  Patient does not have a Health Care Directive or Living Will: Discussed advance care planning with patient; however, patient declined at this time.    Maria Teresa Lama LPN      "

## 2023-10-24 ENCOUNTER — MYC MEDICAL ADVICE (OUTPATIENT)
Dept: FAMILY MEDICINE | Facility: OTHER | Age: 58
End: 2023-10-24
Payer: COMMERCIAL

## 2023-11-03 ENCOUNTER — HOSPITAL ENCOUNTER (OUTPATIENT)
Dept: MRI IMAGING | Facility: OTHER | Age: 58
Discharge: HOME | End: 2023-11-03
Attending: FAMILY MEDICINE
Payer: COMMERCIAL

## 2023-11-03 DIAGNOSIS — Z98.890 STATUS POST LASER ABLATION OF INCOMPETENT VEIN: ICD-10-CM

## 2023-11-03 DIAGNOSIS — M79.605 LEFT LEG PAIN: ICD-10-CM

## 2023-11-03 DIAGNOSIS — I87.2 VENOUS INSUFFICIENCY OF LEFT LEG: ICD-10-CM

## 2023-11-03 PROCEDURE — 73721 MRI JNT OF LWR EXTRE W/O DYE: CPT | Mod: LT

## 2023-11-03 PROCEDURE — 73718 MRI LOWER EXTREMITY W/O DYE: CPT | Mod: LT

## 2024-01-27 ENCOUNTER — E-VISIT (OUTPATIENT)
Dept: URGENT CARE | Facility: CLINIC | Age: 59
End: 2024-01-27
Payer: COMMERCIAL

## 2024-01-27 DIAGNOSIS — J01.90 ACUTE BACTERIAL SINUSITIS: Primary | ICD-10-CM

## 2024-01-27 DIAGNOSIS — B96.89 ACUTE BACTERIAL SINUSITIS: Primary | ICD-10-CM

## 2024-01-27 PROCEDURE — 99421 OL DIG E/M SVC 5-10 MIN: CPT | Performed by: PHYSICIAN ASSISTANT

## 2024-02-21 ENCOUNTER — MYC MEDICAL ADVICE (OUTPATIENT)
Dept: FAMILY MEDICINE | Facility: OTHER | Age: 59
End: 2024-02-21
Payer: COMMERCIAL

## 2024-02-21 DIAGNOSIS — M79.605 LEFT LEG PAIN: ICD-10-CM

## 2024-02-21 DIAGNOSIS — Z98.890 STATUS POST LASER ABLATION OF INCOMPETENT VEIN: ICD-10-CM

## 2024-02-21 DIAGNOSIS — I87.2 VENOUS INSUFFICIENCY OF LEFT LEG: Primary | ICD-10-CM

## 2024-03-19 ENCOUNTER — TELEPHONE (OUTPATIENT)
Dept: FAMILY MEDICINE | Facility: OTHER | Age: 59
End: 2024-03-19
Payer: COMMERCIAL

## 2024-03-19 NOTE — TELEPHONE ENCOUNTER
Patient stated she has worsening pinpoint cramping/ pain and stated she want to discuss what to do with a triage nurse

## 2024-03-19 NOTE — TELEPHONE ENCOUNTER
Last 3 weeks LLQ discomfort has been more persistent. Was told it was IBS in the past. Has had vaginal spotting lately. Hysterectomy several years ago. Feels full and almost ill when eating at times. Feels like bladder is full but barley goes. No fevers. Lot's of hot flashes.     Stools have been harder lately. Writer adivsed increasing water/fiber intake and/or taking miralax.     Appt set for 3/22/24.    AMANDA GUNDERSON RN on 3/19/2024 at 10:26 AM

## 2024-03-19 NOTE — TELEPHONE ENCOUNTER
Called patient back to reschedule appt with different provider. No answer, left message.     AMANDA GUNDERSON RN on 3/19/2024 at 1:43 PM

## 2024-03-19 NOTE — TELEPHONE ENCOUNTER
Patient called back. She is unable to come in on earlier days but can do 3/22/24 at 920 with [facility]. Rescheduled.     AMANDA GUNDERSON RN on 3/19/2024 at 2:10 PM

## 2024-03-20 NOTE — PROGRESS NOTES
"  Assessment & Plan     1. Epigastric pain  3-month history with persistent symptoms.  Differential includes GERD, gastritis, PUD, gallbladder disease, related to symptoms as below, food intolerance, stress, etc.  Vitals and exam stable.  Lab work pending as below.  If labs unremarkable recommend trial course of famotidine for management of GERD/gastritis.  Encouraged bland diet with good hydration.  Follow-up if symptoms persist or worsen.  - CBC and Differential; Future  - Comprehensive Metabolic Panel; Future  - CBC and Differential  - Comprehensive Metabolic Panel    2. Abdominal bloating  3. Pelvic pain  4. Early satiety    5. Urinary problem  Persistent left-sided pelvic pain, bloating, bladder pressure, day of bleeding over the last week.  Differential includes UTI, vaginal infection, STD, structural abnormality such as ovarian cyst, scar tissue, GI cause, malignancy, etc.  Vitals and exam unremarkable.  Pelvic exam without abnormalities.  UA unremarkable for infection or hematuria.  Multiplex swab pending.  Lab work pending as below.  If unremarkable consider follow-up imaging.  - CBC and Differential; Future  - Comprehensive Metabolic Panel; Future  - ; Future  - CBC and Differential  - Comprehensive Metabolic Panel  -   - Multiplex Vaginal Panel by PCR; Future  - Multiplex Vaginal Panel by PCR  - UA Macroscopic with reflex to Microscopic and Culture      BMI  Estimated body mass index is 26.3 kg/m  as calculated from the following:    Height as of this encounter: 1.727 m (5' 8\").    Weight as of this encounter: 78.5 kg (173 lb).   Weight management plan: Discussed healthy diet and exercise guidelines      No follow-ups on file.    Chiquita Mauricio is a 58 year old, presenting for the following health issues:  Vaginal Bleeding    History of Present Illness       Reason for visit:  Lower abdominal pain. Feeling full. Spot bleeding bloated feeling  Symptom onset:  3-4 weeks ago  Symptoms " include:  Bloating. Abdominal pain  Symptom intensity:  Moderate  Symptom progression:  Worsening  Had these symptoms before:  Yes  Has tried/received treatment for these symptoms:  Yes  Previous treatment was successful:  Yes  Prior treatment description:  Diet.    She eats 0-1 servings of fruits and vegetables daily.She consumes 0 sweetened beverage(s) daily.She exercises with enough effort to increase her heart rate 20 to 29 minutes per day.  She exercises with enough effort to increase her heart rate 3 or less days per week.   She is taking medications regularly.     Here for evaluation of multiple complaints.  Patient reports she initially struggled with onset of epigastric discomfort and reflux type symptoms a couple months ago.  She started on over-the-counter Prilosec for 7 weeks without significant improvement.  Since stopping the medication she has noticed progression of the epigastric pain and feels pressure in this area as well.  Seems to be worse after eating.  No significant NSAID use but does report significant stress levels.  Patient also reports last weekend she developed  left-sided pelvic pain and pressure that has persisted through this week.  Over the weekend she did notice some bright red blood followed by light pink tinge to toilet paper when wiping.  She thought it was coming from her vaginal region.  Patient is status post supracervical hysterectomy several years ago.  Ovaries and cervix remain.  Pap smear completed 3/8/2023 and was negative.  Bleeding has since resolved.  Has noticed some sensations of bladder feeling more full with difficulties with urination.  No urinary frequency or urgency, dysuria, flank pain.  Has been noticing decreased appetite and feeling full quite quickly after eating.  No significant fever/chills, nausea, vomiting, diarrhea, constipation, blood in stool, vaginal itching, burning, discharge.      PAST MEDICAL HISTORY:   Past Medical History:   Diagnosis Date     Allergic rhinitis 2011    Depressive disorder  ??    Lump or mass in breast 2007    PONV (postoperative nausea and vomiting)     Recurrent sinusitis 2011    Varicose veins 2011       PAST SURGICAL HISTORY:   Past Surgical History:   Procedure Laterality Date    ABDOMEN SURGERY  2011    BREAST SURGERY  2007    BUNIONECTOMY Right 10/22/2018    Procedure: RIGHT FOOT 1ST METATARSAL DISTAL CHEVERON OSTEOTOMY AND LATERAL SOFT FISSUE RELEASE;  Surgeon: Netta Soni DPM;  Location: [facility]    BUNIONECTOMY RT/LT  2008    COLONOSCOPY N/A 10/30/2017    Procedure: COLONOSCOPY;  WHOLE COLON COLONOSCOPY ;  Surgeon: Erma Jenkins MD;  Location: [facility] OR    ENT SURGERY  10/2013    nose surgery, Dr. Elliott    EXCISE MASS LOWER EXTREMITY Right 2017    Procedure: EXCISE MASS LOWER EXTREMITY;  EXCISION SOFT TISSUE MASS RIGHT LOWER LEG;  Surgeon: Erma Jenkins MD;  Location: [facility] OR    EYE SURGERY  2018    Lasik surgery          GYN SURGERY      partial hysterectomy    laparoscopic supracervical hysterectomy      Fibroids, ovaries intact    Left Breast Bx  2007    Fibrocystic breast disease    PE tube placement      ventilation tube, left         FAMILY HISTORY:   Family History   Problem Relation Age of Onset    No Known Problems Mother     C.A.D. Father     Thyroid Disease Father         nodule; surgical removal    Thyroid Disease Sister     Neurologic Disorder Brother         unknown    Other - See Comments Maternal Grandfather         possible MI COD    Thyroid Disease Paternal Grandmother     Other - See Comments Paternal Grandfather         possible MI COD    Other Cancer Cousin         Pancreatic cancer; 6 month    Other Cancer Maternal Aunt         pancreatic cancer; on year 11 ()    Diabetes No family hx of     Cerebrovascular Disease No family hx of     Other Cancer No family hx of        SOCIAL HISTORY:   Social History     Tobacco Use     "Smoking status: Never    Smokeless tobacco: Never    Tobacco comments:     no passive exposure   Substance Use Topics    Alcohol use: Yes     Comment: occasionally        No Known Allergies  Current Outpatient Medications   Medication    triamcinolone (KENALOG) 0.1 % external cream     No current facility-administered medications for this visit.           Objective    /74   Pulse 71   Temp (!) 96.5  F (35.8  C)   Resp 14   Ht 1.727 m (5' 8\")   Wt 78.5 kg (173 lb)   LMP 01/01/2011 (Approximate)   SpO2 100%   BMI 26.30 kg/m    Body mass index is 26.3 kg/m .  Physical Exam   General: Pleasant, in no apparent distress.  Eyes: Sclera are white and conjunctiva are clear bilaterally. Lacrimal apparatus free of erythema, edema, and discharge bilaterally.  Ears: External ears without erythema or edema.   Cardiovascular: Regular rate and rhythm with S1 equal to S2. No murmurs, friction rubs, or gallops.   Respiratory: Lungs are resonant and clear to auscultation bilaterally. No wheezes, crackles, or rhonchi.  Abdomen: Abdomen is non-distended. Active bowel sounds heard in all four quadrants. Tenderness to palpation in epigastric region and left pelvic region. No rebound tenderness or guarding. No palpable masses noted. No hepatosplenomegaly.  Genitourinary Exam Female: External genitalia, vulva and vagina are without lesions, masses, or ulcerations. Speculum exam reveals normal appearing cervix.   Skin: No jaundice, pallor, rashes, or lesions.  Psych: Appropriate mood and affect.    Results for orders placed or performed in visit on 03/22/24   UA Macroscopic with reflex to Microscopic and Culture     Status: Abnormal    Specimen: Urine, Clean Catch   Result Value Ref Range    Color Urine Light Yellow Colorless, Straw, Light Yellow, Yellow    Appearance Urine Slightly Cloudy (A) Clear    Glucose Urine Negative Negative mg/dL    Bilirubin Urine Negative Negative    Ketones Urine Negative Negative mg/dL    Specific " Gravity Urine 1.012 1.000 - 1.030    Blood Urine Negative Negative    pH Urine 7.0 5.0 - 9.0    Protein Albumin Urine Negative Negative mg/dL    Urobilinogen Urine Normal Normal, 2.0 mg/dL    Nitrite Urine Negative Negative    Leukocyte Esterase Urine Negative Negative    Mucus Urine Present (A) None Seen /LPF    RBC Urine <1 <=2 /HPF    WBC Urine <1 <=5 /HPF    Narrative    Urine Culture not indicated   CBC and Differential     Status: None ()    Narrative    The following orders were created for panel order CBC and Differential.  Procedure                               Abnormality         Status                     ---------                               -----------         ------                     CBC with platelets and d...[[id number]]                                                   Please view results for these tests on the individual orders.       Signed Electronically by: Juanis Padilla PA-C

## 2024-03-22 ENCOUNTER — OFFICE VISIT (OUTPATIENT)
Dept: FAMILY MEDICINE | Facility: OTHER | Age: 59
End: 2024-03-22
Attending: PHYSICIAN ASSISTANT
Payer: COMMERCIAL

## 2024-03-22 VITALS
HEIGHT: 68 IN | WEIGHT: 173 LBS | OXYGEN SATURATION: 100 % | RESPIRATION RATE: 14 BRPM | DIASTOLIC BLOOD PRESSURE: 74 MMHG | SYSTOLIC BLOOD PRESSURE: 108 MMHG | HEART RATE: 71 BPM | BODY MASS INDEX: 26.22 KG/M2 | TEMPERATURE: 96.5 F

## 2024-03-22 DIAGNOSIS — R68.81 EARLY SATIETY: ICD-10-CM

## 2024-03-22 DIAGNOSIS — R10.13 EPIGASTRIC PAIN: Primary | ICD-10-CM

## 2024-03-22 DIAGNOSIS — R10.2 PELVIC PAIN: ICD-10-CM

## 2024-03-22 DIAGNOSIS — R39.89 URINARY PROBLEM: ICD-10-CM

## 2024-03-22 DIAGNOSIS — R14.0 ABDOMINAL BLOATING: ICD-10-CM

## 2024-03-22 LAB
ALBUMIN SERPL BCG-MCNC: 4.4 G/DL (ref 3.5–5.2)
ALBUMIN UR-MCNC: NEGATIVE MG/DL
ALP SERPL-CCNC: 61 U/L (ref 40–150)
ALT SERPL W P-5'-P-CCNC: 17 U/L (ref 0–50)
ANION GAP SERPL CALCULATED.3IONS-SCNC: 9 MMOL/L (ref 7–15)
APPEARANCE UR: ABNORMAL
AST SERPL W P-5'-P-CCNC: 19 U/L (ref 0–45)
BACTERIAL VAGINOSIS VAG-IMP: NEGATIVE
BASOPHILS # BLD AUTO: 0.1 10E3/UL (ref 0–0.2)
BASOPHILS NFR BLD AUTO: 1 %
BILIRUB SERPL-MCNC: 1.1 MG/DL
BILIRUB UR QL STRIP: NEGATIVE
BUN SERPL-MCNC: 19.7 MG/DL (ref 6–20)
CALCIUM SERPL-MCNC: 9.3 MG/DL (ref 8.6–10)
CANDIDA DNA VAG QL NAA+PROBE: NOT DETECTED
CANDIDA GLABRATA / CANDIDA KRUSEI DNA: NOT DETECTED
CHLORIDE SERPL-SCNC: 102 MMOL/L (ref 98–107)
COLOR UR AUTO: ABNORMAL
CREAT SERPL-MCNC: 0.79 MG/DL (ref 0.51–0.95)
DEPRECATED HCO3 PLAS-SCNC: 27 MMOL/L (ref 22–29)
EGFRCR SERPLBLD CKD-EPI 2021: 87 ML/MIN/1.73M2
EOSINOPHIL # BLD AUTO: 0.1 10E3/UL (ref 0–0.7)
EOSINOPHIL NFR BLD AUTO: 1 %
ERYTHROCYTE [DISTWIDTH] IN BLOOD BY AUTOMATED COUNT: 12.5 % (ref 10–15)
GLUCOSE SERPL-MCNC: 102 MG/DL (ref 70–99)
GLUCOSE UR STRIP-MCNC: NEGATIVE MG/DL
HCT VFR BLD AUTO: 38.7 % (ref 35–47)
HGB BLD-MCNC: 12.9 G/DL (ref 11.7–15.7)
HGB UR QL STRIP: NEGATIVE
IMM GRANULOCYTES # BLD: 0 10E3/UL
IMM GRANULOCYTES NFR BLD: 0 %
KETONES UR STRIP-MCNC: NEGATIVE MG/DL
LEUKOCYTE ESTERASE UR QL STRIP: NEGATIVE
LYMPHOCYTES # BLD AUTO: 2.4 10E3/UL (ref 0.8–5.3)
LYMPHOCYTES NFR BLD AUTO: 37 %
MCH RBC QN AUTO: 31.4 PG (ref 26.5–33)
MCHC RBC AUTO-ENTMCNC: 33.3 G/DL (ref 31.5–36.5)
MCV RBC AUTO: 94 FL (ref 78–100)
MONOCYTES # BLD AUTO: 0.5 10E3/UL (ref 0–1.3)
MONOCYTES NFR BLD AUTO: 8 %
MUCOUS THREADS #/AREA URNS LPF: PRESENT /LPF
NEUTROPHILS # BLD AUTO: 3.4 10E3/UL (ref 1.6–8.3)
NEUTROPHILS NFR BLD AUTO: 52 %
NITRATE UR QL: NEGATIVE
NRBC # BLD AUTO: 0 10E3/UL
NRBC BLD AUTO-RTO: 0 /100
PH UR STRIP: 7 [PH] (ref 5–9)
PLATELET # BLD AUTO: 227 10E3/UL (ref 150–450)
POTASSIUM SERPL-SCNC: 4 MMOL/L (ref 3.4–5.3)
PROT SERPL-MCNC: 7.1 G/DL (ref 6.4–8.3)
RBC # BLD AUTO: 4.11 10E6/UL (ref 3.8–5.2)
RBC URINE: <1 /HPF
SODIUM SERPL-SCNC: 138 MMOL/L (ref 135–145)
SP GR UR STRIP: 1.01 (ref 1–1.03)
T VAGINALIS DNA VAG QL NAA+PROBE: NOT DETECTED
UROBILINOGEN UR STRIP-MCNC: NORMAL MG/DL
WBC # BLD AUTO: 6.5 10E3/UL (ref 4–11)
WBC URINE: <1 /HPF

## 2024-03-22 PROCEDURE — 0352U MULTIPLEX VAGINAL PANEL BY PCR: CPT | Mod: ZL | Performed by: PHYSICIAN ASSISTANT

## 2024-03-22 PROCEDURE — 86304 IMMUNOASSAY TUMOR CA 125: CPT | Mod: ZL | Performed by: PHYSICIAN ASSISTANT

## 2024-03-22 PROCEDURE — 81001 URINALYSIS AUTO W/SCOPE: CPT | Mod: ZL | Performed by: PHYSICIAN ASSISTANT

## 2024-03-22 PROCEDURE — 99213 OFFICE O/P EST LOW 20 MIN: CPT | Performed by: PHYSICIAN ASSISTANT

## 2024-03-22 PROCEDURE — 36415 COLL VENOUS BLD VENIPUNCTURE: CPT | Mod: ZL | Performed by: PHYSICIAN ASSISTANT

## 2024-03-22 PROCEDURE — 85004 AUTOMATED DIFF WBC COUNT: CPT | Mod: ZL | Performed by: PHYSICIAN ASSISTANT

## 2024-03-22 PROCEDURE — 80053 COMPREHEN METABOLIC PANEL: CPT | Mod: ZL | Performed by: PHYSICIAN ASSISTANT

## 2024-03-22 ASSESSMENT — PATIENT HEALTH QUESTIONNAIRE - PHQ9
SUM OF ALL RESPONSES TO PHQ QUESTIONS 1-9: 6
10. IF YOU CHECKED OFF ANY PROBLEMS, HOW DIFFICULT HAVE THESE PROBLEMS MADE IT FOR YOU TO DO YOUR WORK, TAKE CARE OF THINGS AT HOME, OR GET ALONG WITH OTHER PEOPLE: NOT DIFFICULT AT ALL
SUM OF ALL RESPONSES TO PHQ QUESTIONS 1-9: 6

## 2024-03-22 ASSESSMENT — PAIN SCALES - GENERAL: PAINLEVEL: MILD PAIN (2)

## 2024-03-22 NOTE — NURSING NOTE
Patient presents to clinic with vaginal bleeding when she would wipe on Monday and also having  a bloating feeling that bladder is full but only goes a little.  Doris Doll, RICCO ....................  3/22/2024   9:07 AM  EXT [id number]

## 2024-03-23 LAB — CANCER AG125 SERPL-ACNC: 10 U/ML

## 2024-04-23 NOTE — PROGRESS NOTES
"  Assessment & Plan     1. Menopausal syndrome (hot flashes)  Chronic, recurrent.  Restart estrogen for more rapid improvement in symptoms; but would be interested in attempting control with venlafaxine - therefore will also start 37.5mg daily and see if she can again wean off of her estrogen as she is able to (similar instructions provided for when she did this last year).  Consideration for sleep aid if persisting.  Discussed no benefit for hormone levels as they do not direct therapies.  Regular exercise, consider meditation - menopause handouts will be sent on [employer].  - estradiol (ESTRACE) 0.5 MG tablet; Take 1 tablet (0.5 mg) by mouth daily TAKE 1 TABLET(0.5 MG) BY MOUTH DAILY  Dispense: 90 tablet; Refill: 4  - venlafaxine (EFFEXOR XR) 37.5 MG 24 hr capsule; Take 1 capsule (37.5 mg) by mouth daily  Dispense: 90 capsule; Refill: 4      MDM:  Prescription drug management    Follow up: prn.      Chiquita Mauricio is a 58 year old, presenting for the following health issues:  Insomnia and Menopausal Sx        4/25/2024     8:28 AM   Additional Questions   Roomed by RICCO Morel   Accompanied by self       History of Present Illness       Reason for visit:  Regular pap    She eats 2-3 servings of fruits and vegetables daily.She consumes 0 sweetened beverage(s) daily.She exercises with enough effort to increase her heart rate 30 to 60 minutes per day.  She exercises with enough effort to increase her heart rate 3 or less days per week.   She is taking medications regularly.      Last pap was 3/8/23, neg co-testing.    Having VMS.   Most bothered by: insomnia.  Typically only sleeping from 9p-11p and again 2-3a.   Hot flashes and night sweats - could be bearable but wondering if the insomnia is making them worse.        Objective    /76   Pulse 79   Temp (!) 96.6  F (35.9  C) (Tympanic)   Resp 16   Ht 1.727 m (5' 8\")   Wt 79.4 kg (175 lb)   LMP 01/01/2011 (Approximate)   SpO2 99%   BMI 26.61 kg/m  "   Body mass index is 26.61 kg/m .  Physical Exam   GENERAL: alert and no distress  NECK: no adenopathy, no asymmetry, masses, or scars  RESP: lungs clear to auscultation - no rales, rhonchi or wheezes  CV: regular rate and rhythm, normal S1 S2, no S3 or S4, no murmur, click or rub, no peripheral edema  PSYCH: mentation appears normal, affect normal/bright    No results found for any visits on 04/25/24.        Signed Electronically by: Estella Gamble DO

## 2024-04-25 ENCOUNTER — OFFICE VISIT (OUTPATIENT)
Dept: FAMILY MEDICINE | Facility: OTHER | Age: 59
End: 2024-04-25
Attending: FAMILY MEDICINE
Payer: COMMERCIAL

## 2024-04-25 VITALS
TEMPERATURE: 96.6 F | BODY MASS INDEX: 26.52 KG/M2 | DIASTOLIC BLOOD PRESSURE: 76 MMHG | OXYGEN SATURATION: 99 % | RESPIRATION RATE: 16 BRPM | WEIGHT: 175 LBS | HEART RATE: 79 BPM | SYSTOLIC BLOOD PRESSURE: 104 MMHG | HEIGHT: 68 IN

## 2024-04-25 DIAGNOSIS — N95.1 MENOPAUSAL SYNDROME (HOT FLASHES): ICD-10-CM

## 2024-04-25 PROCEDURE — 99214 OFFICE O/P EST MOD 30 MIN: CPT | Performed by: FAMILY MEDICINE

## 2024-04-25 RX ORDER — ESTRADIOL 0.5 MG/1
0.5 TABLET ORAL DAILY
Qty: 90 TABLET | Refills: 4 | Status: SHIPPED | OUTPATIENT
Start: 2024-04-25

## 2024-04-25 RX ORDER — VENLAFAXINE HYDROCHLORIDE 37.5 MG/1
37.5 CAPSULE, EXTENDED RELEASE ORAL DAILY
Qty: 90 CAPSULE | Refills: 4 | Status: SHIPPED | OUTPATIENT
Start: 2024-04-25

## 2024-04-25 ASSESSMENT — PATIENT HEALTH QUESTIONNAIRE - PHQ9
SUM OF ALL RESPONSES TO PHQ QUESTIONS 1-9: 6
10. IF YOU CHECKED OFF ANY PROBLEMS, HOW DIFFICULT HAVE THESE PROBLEMS MADE IT FOR YOU TO DO YOUR WORK, TAKE CARE OF THINGS AT HOME, OR GET ALONG WITH OTHER PEOPLE: SOMEWHAT DIFFICULT
SUM OF ALL RESPONSES TO PHQ QUESTIONS 1-9: 6

## 2024-04-25 ASSESSMENT — PAIN SCALES - GENERAL: PAINLEVEL: NO PAIN (0)

## 2024-04-25 NOTE — NURSING NOTE
"Chief Complaint   Patient presents with    Insomnia    Menopausal Sx     Patient originally thought she was due for a pap but she is not due until 03/2028. She would like to be seen for menopausal symptoms.     Initial /76   Pulse 79   Temp (!) 96.6  F (35.9  C) (Tympanic)   Resp 16   Ht 1.727 m (5' 8\")   Wt 79.4 kg (175 lb)   LMP 01/01/2011 (Approximate)   SpO2 99%   BMI 26.61 kg/m   Estimated body mass index is 26.61 kg/m  as calculated from the following:    Height as of this encounter: 1.727 m (5' 8\").    Weight as of this encounter: 79.4 kg (175 lb).  Medication Review: complete    The next two questions are to help us understand your food security.  If you are feeling you need any assistance in this area, we have resources available to support you today.          3/19/2024   SDOH- Food Insecurity   Within the past 12 months, did you worry that your food would run out before you got money to buy more? N   Within the past 12 months, did the food you bought just not last and you didn t have money to get more? N         Health Care Directive:  Patient does not have a Health Care Directive or Living Will: Discussed advance care planning with patient; information given to patient to review.    Maria Teresa Lama LPN      "

## 2024-04-28 ENCOUNTER — HEALTH MAINTENANCE LETTER (OUTPATIENT)
Age: 59
End: 2024-04-28

## 2024-07-06 ENCOUNTER — HOSPITAL ENCOUNTER (EMERGENCY)
Facility: HOSPITAL | Age: 59
Discharge: HOME | End: 2024-07-06
Attending: NURSE PRACTITIONER | Admitting: NURSE PRACTITIONER
Payer: COMMERCIAL

## 2024-07-06 VITALS
DIASTOLIC BLOOD PRESSURE: 76 MMHG | OXYGEN SATURATION: 99 % | RESPIRATION RATE: 15 BRPM | TEMPERATURE: 97.6 F | HEIGHT: 68 IN | WEIGHT: 176.2 LBS | SYSTOLIC BLOOD PRESSURE: 114 MMHG | BODY MASS INDEX: 26.7 KG/M2 | HEART RATE: 70 BPM

## 2024-07-06 DIAGNOSIS — J01.90 ACUTE SINUSITIS WITH SYMPTOMS > 10 DAYS: Primary | ICD-10-CM

## 2024-07-06 LAB — GROUP A STREP BY PCR: NOT DETECTED

## 2024-07-06 PROCEDURE — 87651 STREP A DNA AMP PROBE: CPT | Performed by: NURSE PRACTITIONER

## 2024-07-06 PROCEDURE — 99213 OFFICE O/P EST LOW 20 MIN: CPT | Performed by: NURSE PRACTITIONER

## 2024-07-06 PROCEDURE — G0463 HOSPITAL OUTPT CLINIC VISIT: HCPCS

## 2024-07-06 ASSESSMENT — ENCOUNTER SYMPTOMS
HEADACHES: 0
COUGH: 1
NECK STIFFNESS: 0
RHINORRHEA: 1
EYE DISCHARGE: 0
VOMITING: 0
NAUSEA: 0
SHORTNESS OF BREATH: 0
SINUS PRESSURE: 1
PSYCHIATRIC NEGATIVE: 1
SINUS PAIN: 1
SORE THROAT: 1
DIARRHEA: 0
FEVER: 0
NECK PAIN: 0
EYE REDNESS: 0
MYALGIAS: 0
TROUBLE SWALLOWING: 0
FATIGUE: 1
CHILLS: 0
ABDOMINAL PAIN: 0

## 2024-07-06 ASSESSMENT — ACTIVITIES OF DAILY LIVING (ADL): ADLS_ACUITY_SCORE: 33

## 2024-07-06 NOTE — ED TRIAGE NOTES
Pt presents with c/o cough, fatigue, sore throat, left ear pain, left facial pain. Sx started 11 days ago. Pt had been taking advil dual, last dose at midnight. Has been using andrea pot. Denies known exposures, smoking, hx of asthma, or hx of COPD.

## 2024-07-06 NOTE — DISCHARGE INSTRUCTIONS
Augmentin as ordered  - Take entire course of antibiotic even if you start to feel better.  - Antibiotics can cause stomach upset including nausea and diarrhea. Read your bottle or ask the pharmacist if antibiotic can be taken with food to help prevent nausea. If you have symptoms of diarrhea you can take an over-the-counter probiotic and/or increase foods with probiotics such as yogurt, Keeler, sauerkraut.    OTC Flonase as needed for nasal congestion    Push fluids    Alternate tylenol and ibuprofen as needed for pain or fever    Follow up with primary care provider or return to urgent care/ED with any worsening in condition or additional concerns.

## 2024-07-06 NOTE — ED PROVIDER NOTES
History     Chief Complaint   Patient presents with    Flu Symptoms     HPI  Luly Freeman is a 59 year old female who presents to urgent care today ambulatory with complaints of fatigue, congestion, rhinorrhea, sinus pain and pressure, sore throat and cough which has been ongoing for the past 11 days.  Staying hydrated.  No rashes.  Denies any fever, chills, nausea, vomiting, diarrhea, shortness of breath or chest pain.  Has been taking Advil dual and nasal irrigation with some improvement.  No other concerns.    Allergies:  No Known Allergies    Problem List:    Patient Active Problem List    Diagnosis Date Noted    Recurrent major depressive disorder, in remission (H24) 06/16/2022     Priority: Medium     Recurrent; clemons are worse      ACP (advance care planning) 06/08/2016     Priority: Medium     Advance Care Planning 6/8/2016: ACP Review of Chart / Resources Provided:  Reviewed chart for advance care plan.  Luly Freeman has no plan or code status on file. Discussed available resources and provided with information. Confirmed code status reflects current choices pending further ACP discussions.  Confirmed/documented legally designated decision makers.  Added by Judith Richey            Multiple respiratory allergies 07/01/2013     Priority: Medium        Past Medical History:    Past Medical History:   Diagnosis Date    Allergic rhinitis 06/22/2011    Depressive disorder 2008 ??    Lump or mass in breast 03/12/2007    PONV (postoperative nausea and vomiting)     Recurrent sinusitis 07/20/2011    Varicose veins 06/22/2011       Past Surgical History:    Past Surgical History:   Procedure Laterality Date    ABDOMEN SURGERY  2011    BREAST SURGERY  03/2007    BUNIONECTOMY Right 10/22/2018    Procedure: RIGHT FOOT 1ST METATARSAL DISTAL CHEVERON OSTEOTOMY AND LATERAL SOFT FISSUE RELEASE;  Surgeon: Netta Soni DPM;  Location: [facility]    BUNIONECTOMY RT/LT  04/2008    COLONOSCOPY N/A 10/30/2017     Procedure: COLONOSCOPY;  WHOLE COLON COLONOSCOPY ;  Surgeon: Erma Jenkins MD;  Location: [facility]    ENT SURGERY  10/2013    nose surgery, Dr. Elliott    EXCISE MASS LOWER EXTREMITY Right 2017    Procedure: EXCISE MASS LOWER EXTREMITY;  EXCISION SOFT TISSUE MASS RIGHT LOWER LEG;  Surgeon: Erma Jenkins MD;  Location: [facility]    EYE SURGERY  2018    Lasik surgery          GYN SURGERY      partial hysterectomy    laparoscopic supracervical hysterectomy      Fibroids, ovaries intact    Left Breast Bx  2007    Fibrocystic breast disease    PE tube placement      ventilation tube, left         Family History:    Family History   Problem Relation Age of Onset    No Known Problems Mother     C.A.D. Father     Thyroid Disease Father         nodule; surgical removal    Thyroid Disease Sister     Neurologic Disorder Brother         unknown    Other - See Comments Maternal Grandfather         possible MI COD    Thyroid Disease Paternal Grandmother     Other - See Comments Paternal Grandfather         possible MI COD    Other Cancer Cousin         Pancreatic cancer; 6 month    Other Cancer Maternal Aunt         pancreatic cancer; on year 11 ()    Diabetes No family hx of     Cerebrovascular Disease No family hx of     Other Cancer No family hx of        Social History:  Marital Status:   [2]  Social History     Tobacco Use    Smoking status: Never    Smokeless tobacco: Never    Tobacco comments:     no passive exposure   Vaping Use    Vaping status: Never Used   Substance Use Topics    Alcohol use: Yes     Comment: occasionally    Drug use: No        Medications:    amoxicillin-clavulanate (AUGMENTIN) 875-125 MG tablet  estradiol (ESTRACE) 0.5 MG tablet  venlafaxine (EFFEXOR XR) 37.5 MG 24 hr capsule  triamcinolone (KENALOG) 0.1 % external cream      Review of Systems   Constitutional:  Positive for fatigue. Negative for chills and fever.   HENT:  Positive for congestion,  "rhinorrhea, sinus pressure, sinus pain and sore throat. Negative for ear pain and trouble swallowing.    Eyes:  Negative for discharge and redness.   Respiratory:  Positive for cough. Negative for shortness of breath.    Cardiovascular:  Negative for chest pain.   Gastrointestinal:  Negative for abdominal pain, diarrhea, nausea and vomiting.   Genitourinary:  Negative for decreased urine volume.   Musculoskeletal:  Negative for gait problem, myalgias, neck pain and neck stiffness.   Skin:  Negative for rash.   Neurological:  Negative for headaches.   Psychiatric/Behavioral: Negative.       Physical Exam   BP: 114/76  Pulse: 70  Temp: 97.6  F (36.4  C)  Resp: 15  Height: 172.7 cm (5' 8\")  Weight: 79.9 kg (176 lb 3.2 oz)  SpO2: 99 %    Physical Exam  Vitals and nursing note reviewed.   Constitutional:       General: She is not in acute distress.     Appearance: Normal appearance. She is not ill-appearing or toxic-appearing.   HENT:      Right Ear: Tympanic membrane, ear canal and external ear normal.      Left Ear: Tympanic membrane, ear canal and external ear normal.      Nose: Congestion and rhinorrhea present.      Right Sinus: Maxillary sinus tenderness present. No frontal sinus tenderness.      Left Sinus: Maxillary sinus tenderness present. No frontal sinus tenderness.      Mouth/Throat:      Mouth: Mucous membranes are moist.      Pharynx: Oropharynx is clear. Posterior oropharyngeal erythema present. No oropharyngeal exudate.   Cardiovascular:      Rate and Rhythm: Normal rate and regular rhythm.      Pulses: Normal pulses.      Heart sounds: Normal heart sounds.   Pulmonary:      Effort: Pulmonary effort is normal.      Breath sounds: Normal breath sounds.   Abdominal:      General: Bowel sounds are normal.      Palpations: Abdomen is soft.      Tenderness: There is no abdominal tenderness.   Musculoskeletal:      Cervical back: Normal range of motion and neck supple. No rigidity or tenderness. "   Lymphadenopathy:      Cervical: No cervical adenopathy.   Skin:     General: Skin is warm and dry.   Neurological:      Mental Status: She is alert.   Psychiatric:         Mood and Affect: Mood normal.       ED Course     No results found for this or any previous visit (from the past 24 hour(s)).    Medications - No data to display    Assessments & Plan (with Medical Decision Making)     I have reviewed the nursing notes.    I have reviewed the findings, diagnosis, plan and need for follow up with the patient.  (J01.90) Acute sinusitis with symptoms > 10 days  (primary encounter diagnosis)  Plan:   Patient ambulatory with a nontoxic appearance.  Lungs clear throughout.  No signs of otitis media.  Throat erythema, strep test pending.  Staying hydrated.  No rashes.  Maxillary sinus tenderness present, symptoms have been ongoing for the past 11 days.  Will treat sinusitis with Augmentin.  Alternate Tylenol and ibuprofen as needed for pain or fever.  Push fluids.  Over-the-counter Flonase as needed for nasal congestion.  Follow-up with primary care provider or return to urgent care/ED with any worsening in condition or additional concerns.  Patient in agreement treatment plan.    Discharge Medication List as of 7/6/2024  8:26 AM        START taking these medications    Details   amoxicillin-clavulanate (AUGMENTIN) 875-125 MG tablet Take 1 tablet by mouth 2 times daily for 10 days, Disp-20 tablet, R-0, E-Prescribe           Final diagnoses:   Acute sinusitis with symptoms > 10 days     7/6/2024   [facility]       Cierra Turcios NP  07/06/24 [id number]

## 2024-11-30 ENCOUNTER — E-VISIT (OUTPATIENT)
Dept: URGENT CARE | Facility: CLINIC | Age: 59
End: 2024-11-30
Payer: COMMERCIAL

## 2024-11-30 DIAGNOSIS — J06.9 VIRAL URI: Primary | ICD-10-CM

## 2024-11-30 RX ORDER — GUAIFENESIN 1200 MG/1
1200 TABLET, EXTENDED RELEASE ORAL 2 TIMES DAILY
Qty: 60 TABLET | Refills: 0 | Status: SHIPPED | OUTPATIENT
Start: 2024-11-30

## 2024-11-30 NOTE — PATIENT INSTRUCTIONS
The symptoms you describe suggest a viral cause, which is much more common than a bacterial cause. Antibiotics will treat bacterial infections, but have no effect on viral infections. If possible, especially if improving, start with symptom care for the first 7-10 days, then consider seeking further treatment or taking an antibiotic. Bacterial infections generally are more severe, including symptoms such as pus, fever over 101degrees F, or rapidly worsening.    I recommend using Mucinex and if not improving in 5 days, come in for evaluation.

## 2025-01-02 ENCOUNTER — E-VISIT (OUTPATIENT)
Dept: FAMILY MEDICINE | Facility: OTHER | Age: 60
End: 2025-01-02
Payer: COMMERCIAL

## 2025-01-02 DIAGNOSIS — J02.9 PHARYNGITIS, UNSPECIFIED ETIOLOGY: Primary | ICD-10-CM

## 2025-01-02 RX ORDER — PENICILLIN V POTASSIUM 500 MG/1
500 TABLET, FILM COATED ORAL 2 TIMES DAILY
Qty: 20 TABLET | Refills: 0 | Status: SHIPPED | OUTPATIENT
Start: 2025-01-02 | End: 2025-01-12

## 2025-01-02 NOTE — PATIENT INSTRUCTIONS
Sore Throat: Care Instructions  Overview     Infection by bacteria or a virus causes most sore throats. Cigarette smoke, dry air, air pollution, allergies, and yelling can also cause a sore throat. Sore throats can be painful and annoying. Fortunately, most sore throats go away on their own. If you have a bacterial infection, your doctor may prescribe antibiotics.  Follow-up care is a key part of your treatment and safety. Be sure to make and go to all appointments, and call your doctor if you are having problems. It's also a good idea to know your test results and keep a list of the medicines you take.  How can you care for yourself at home?  If your doctor prescribed antibiotics, take them as directed. Do not stop taking them just because you feel better. You need to take the full course of antibiotics.  Gargle with warm salt water several times a day to help reduce swelling and relieve pain. Mix 1/2 teaspoon of salt in 1 cup of warm water.  Take an over-the-counter pain medicine, such as acetaminophen (Tylenol), ibuprofen (Advil, Motrin), or naproxen (Aleve). Read and follow all instructions on the label.  Be careful when taking over-the-counter cold or flu medicines and Tylenol at the same time. Many of these medicines have acetaminophen, which is Tylenol. Read the labels to make sure that you are not taking more than the recommended dose. Too much acetaminophen (Tylenol) can be harmful.  Drink plenty of fluids. Fluids may help soothe an irritated throat. Hot fluids, such as tea or soup, may help decrease throat pain.  Use over-the-counter throat lozenges to soothe pain. Regular cough drops or hard candy may also help. These should not be given to young children because of the risk of choking.  Do not smoke or allow others to smoke around you. If you need help quitting, talk to your doctor about stop-smoking programs and medicines. These can increase your chances of quitting for good.  Use a vaporizer or  "humidifier to add moisture to your bedroom. Follow the directions for cleaning the machine.  When should you call for help?   Call your doctor now or seek immediate medical care if:    You have trouble breathing.     Your sore throat gets much worse on one side.     You have new or worse trouble swallowing.     You have a new or higher fever.   Watch closely for changes in your health, and be sure to contact your doctor if you do not get better as expected.  Where can you learn more?  Go to https://www.[employer].net/patiented  Enter U420 in the search box to learn more about \"Sore Throat: Care Instructions.\"  Current as of: September 27, 2023  Content Version: 14.3    2024 [employer].   Care instructions adapted under license by your healthcare professional. If you have questions about a medical condition or this instruction, always ask your healthcare professional. [employer] disclaims any warranty or liability for your use of this information.    Thank you for choosing us for your care. I have placed an order for a prescription so that you can start treatment. View your full visit summary for details by clicking on the link below. Your pharmacist will able to address any questions you may have about the medication.     If you're not feeling better within 5-7 days, please schedule an appointment.  You can schedule an appointment right here in [facility], or call [phone]  If the visit is for the same symptoms as your eVisit, we'll refund the cost of your eVisit if seen within seven days.    "

## 2025-02-03 ENCOUNTER — MYC MEDICAL ADVICE (OUTPATIENT)
Dept: FAMILY MEDICINE | Facility: OTHER | Age: 60
End: 2025-02-03
Payer: COMMERCIAL

## 2025-02-06 ENCOUNTER — PATIENT OUTREACH (OUTPATIENT)
Dept: CARE COORDINATION | Facility: CLINIC | Age: 60
End: 2025-02-06
Payer: COMMERCIAL

## 2025-03-12 ENCOUNTER — TELEPHONE (OUTPATIENT)
Dept: MAMMOGRAPHY | Facility: OTHER | Age: 60
End: 2025-03-12

## 2025-03-12 ENCOUNTER — ANCILLARY PROCEDURE (OUTPATIENT)
Dept: MAMMOGRAPHY | Facility: OTHER | Age: 60
End: 2025-03-12
Attending: FAMILY MEDICINE
Payer: COMMERCIAL

## 2025-03-12 DIAGNOSIS — Z12.31 VISIT FOR SCREENING MAMMOGRAM: ICD-10-CM

## 2025-03-12 PROCEDURE — 77067 SCR MAMMO BI INCL CAD: CPT | Mod: TC | Performed by: RADIOLOGY

## 2025-03-12 PROCEDURE — 77063 BREAST TOMOSYNTHESIS BI: CPT | Mod: TC | Performed by: RADIOLOGY

## 2025-03-17 ENCOUNTER — OFFICE VISIT (OUTPATIENT)
Dept: FAMILY MEDICINE | Facility: OTHER | Age: 60
End: 2025-03-17
Attending: STUDENT IN AN ORGANIZED HEALTH CARE EDUCATION/TRAINING PROGRAM
Payer: COMMERCIAL

## 2025-03-17 VITALS
RESPIRATION RATE: 16 BRPM | BODY MASS INDEX: 26.51 KG/M2 | DIASTOLIC BLOOD PRESSURE: 64 MMHG | HEART RATE: 74 BPM | SYSTOLIC BLOOD PRESSURE: 108 MMHG | WEIGHT: 174.9 LBS | HEIGHT: 68 IN | OXYGEN SATURATION: 98 % | TEMPERATURE: 96.7 F

## 2025-03-17 DIAGNOSIS — Z76.89 ENCOUNTER TO ESTABLISH CARE: Primary | ICD-10-CM

## 2025-03-17 DIAGNOSIS — N95.1 MENOPAUSAL SYNDROME (HOT FLASHES): ICD-10-CM

## 2025-03-17 DIAGNOSIS — Z13.21 ENCOUNTER FOR VITAMIN DEFICIENCY SCREENING: ICD-10-CM

## 2025-03-17 DIAGNOSIS — T75.3XXD MOTION SICKNESS, SUBSEQUENT ENCOUNTER: ICD-10-CM

## 2025-03-17 DIAGNOSIS — Z00.00 LABORATORY EXAMINATION ORDERED AS PART OF A ROUTINE GENERAL MEDICAL EXAMINATION: ICD-10-CM

## 2025-03-17 DIAGNOSIS — F33.40 RECURRENT MAJOR DEPRESSIVE DISORDER, IN REMISSION: Chronic | ICD-10-CM

## 2025-03-17 LAB
ALBUMIN SERPL BCG-MCNC: 4.2 G/DL (ref 3.5–5.2)
ALP SERPL-CCNC: 55 U/L (ref 40–150)
ALT SERPL W P-5'-P-CCNC: 24 U/L (ref 0–50)
ANION GAP SERPL CALCULATED.3IONS-SCNC: 9 MMOL/L (ref 7–15)
AST SERPL W P-5'-P-CCNC: 24 U/L (ref 0–45)
BASOPHILS # BLD AUTO: 0 10E3/UL (ref 0–0.2)
BASOPHILS NFR BLD AUTO: 0 %
BILIRUB SERPL-MCNC: 0.9 MG/DL
BUN SERPL-MCNC: 15.5 MG/DL (ref 6–20)
CALCIUM SERPL-MCNC: 8.9 MG/DL (ref 8.8–10.4)
CHLORIDE SERPL-SCNC: 105 MMOL/L (ref 98–107)
CHOLEST SERPL-MCNC: 187 MG/DL
CREAT SERPL-MCNC: 0.78 MG/DL (ref 0.51–0.95)
EGFRCR SERPLBLD CKD-EPI 2021: 88 ML/MIN/1.73M2
EOSINOPHIL # BLD AUTO: 0.1 10E3/UL (ref 0–0.7)
EOSINOPHIL NFR BLD AUTO: 1 %
ERYTHROCYTE [DISTWIDTH] IN BLOOD BY AUTOMATED COUNT: 13 % (ref 10–15)
FASTING STATUS PATIENT QL REPORTED: YES
FASTING STATUS PATIENT QL REPORTED: YES
GLUCOSE SERPL-MCNC: 97 MG/DL (ref 70–99)
HCO3 SERPL-SCNC: 25 MMOL/L (ref 22–29)
HCT VFR BLD AUTO: 37.4 % (ref 35–47)
HDLC SERPL-MCNC: 89 MG/DL
HGB BLD-MCNC: 12.5 G/DL (ref 11.7–15.7)
IMM GRANULOCYTES # BLD: 0 10E3/UL
IMM GRANULOCYTES NFR BLD: 0 %
LDLC SERPL CALC-MCNC: 88 MG/DL
LYMPHOCYTES # BLD AUTO: 3.1 10E3/UL (ref 0.8–5.3)
LYMPHOCYTES NFR BLD AUTO: 40 %
MCH RBC QN AUTO: 30.7 PG (ref 26.5–33)
MCHC RBC AUTO-ENTMCNC: 33.4 G/DL (ref 31.5–36.5)
MCV RBC AUTO: 92 FL (ref 78–100)
MONOCYTES # BLD AUTO: 0.6 10E3/UL (ref 0–1.3)
MONOCYTES NFR BLD AUTO: 7 %
NEUTROPHILS # BLD AUTO: 4 10E3/UL (ref 1.6–8.3)
NEUTROPHILS NFR BLD AUTO: 52 %
NONHDLC SERPL-MCNC: 98 MG/DL
NRBC # BLD AUTO: 0 10E3/UL
NRBC BLD AUTO-RTO: 0 /100
PLATELET # BLD AUTO: 231 10E3/UL (ref 150–450)
POTASSIUM SERPL-SCNC: 3.8 MMOL/L (ref 3.4–5.3)
PROT SERPL-MCNC: 7.1 G/DL (ref 6.4–8.3)
RBC # BLD AUTO: 4.07 10E6/UL (ref 3.8–5.2)
SODIUM SERPL-SCNC: 139 MMOL/L (ref 135–145)
TRIGL SERPL-MCNC: 48 MG/DL
TSH SERPL DL<=0.005 MIU/L-ACNC: 0.76 UIU/ML (ref 0.3–4.2)
WBC # BLD AUTO: 7.7 10E3/UL (ref 4–11)

## 2025-03-17 RX ORDER — ONDANSETRON 4 MG/1
4 TABLET, FILM COATED ORAL EVERY 8 HOURS PRN
Qty: 20 TABLET | Refills: 0 | Status: SHIPPED | OUTPATIENT
Start: 2025-03-17

## 2025-03-17 ASSESSMENT — ANXIETY QUESTIONNAIRES
1. FEELING NERVOUS, ANXIOUS, OR ON EDGE: SEVERAL DAYS
4. TROUBLE RELAXING: SEVERAL DAYS
5. BEING SO RESTLESS THAT IT IS HARD TO SIT STILL: SEVERAL DAYS
3. WORRYING TOO MUCH ABOUT DIFFERENT THINGS: SEVERAL DAYS
IF YOU CHECKED OFF ANY PROBLEMS ON THIS QUESTIONNAIRE, HOW DIFFICULT HAVE THESE PROBLEMS MADE IT FOR YOU TO DO YOUR WORK, TAKE CARE OF THINGS AT HOME, OR GET ALONG WITH OTHER PEOPLE: NOT DIFFICULT AT ALL
GAD7 TOTAL SCORE: 5
GAD7 TOTAL SCORE: 5
7. FEELING AFRAID AS IF SOMETHING AWFUL MIGHT HAPPEN: NOT AT ALL
6. BECOMING EASILY ANNOYED OR IRRITABLE: NOT AT ALL
7. FEELING AFRAID AS IF SOMETHING AWFUL MIGHT HAPPEN: NOT AT ALL
GAD7 TOTAL SCORE: 5
2. NOT BEING ABLE TO STOP OR CONTROL WORRYING: SEVERAL DAYS
8. IF YOU CHECKED OFF ANY PROBLEMS, HOW DIFFICULT HAVE THESE MADE IT FOR YOU TO DO YOUR WORK, TAKE CARE OF THINGS AT HOME, OR GET ALONG WITH OTHER PEOPLE?: NOT DIFFICULT AT ALL

## 2025-03-17 ASSESSMENT — PAIN SCALES - GENERAL: PAINLEVEL_OUTOF10: NO PAIN (0)

## 2025-03-17 ASSESSMENT — PATIENT HEALTH QUESTIONNAIRE - PHQ9
SUM OF ALL RESPONSES TO PHQ QUESTIONS 1-9: 4
SUM OF ALL RESPONSES TO PHQ QUESTIONS 1-9: 4
10. IF YOU CHECKED OFF ANY PROBLEMS, HOW DIFFICULT HAVE THESE PROBLEMS MADE IT FOR YOU TO DO YOUR WORK, TAKE CARE OF THINGS AT HOME, OR GET ALONG WITH OTHER PEOPLE: NOT DIFFICULT AT ALL

## 2025-03-17 NOTE — PATIENT INSTRUCTIONS
Isometric TMJ stretches  Could do physical therapy   Could try low dose muscle relaxer - flexeril  Okay to try topical voltaren  Peppermint oil is natural alternative for muscles     Thank you for choosing [facility].   I have office hours 8:00 am to 4:30 pm on Mondays, Tuesdays, Thursdays and Fridays. I am out of the office most Wednesdays, although I do occasionally work one Wednesday per month.   Following your visit, if you had labs and diagnostic testing, once they have returned, I will review them and you will be contacted by myself or my nurse if there are concerns. You can also view the labs on [employer].   For refills, notify your pharmacy regarding what you need and the pharmacy will generate a refill request. Please plan ahead and allow 3-5 days for refill requests.  If you have an urgent/same day appointment need, please request an urgent visit when you call and our support staff will send me an Over[employer] request to try to accommodate you for the urgent visit. I like to fit in and see my own patients and hopefully save you time too!   You can also now schedule appointments on the [employer] taylor.   In the event that you need to be seen for urgent concerns and I am out of office, please see one of my colleagues for acute concerns or you may also present to Urgent Care or the ER.  I appreciate the opportunity to serve you and look forward to supporting your healthcare needs in the future.        To help keep bones strong, in addition to routine cardio exercise and weight based exercise, you should get 1000 units Vitamin D daily and 1200 mg of Calcium from diet AND supplement COMBINED. Calcium should be taken in two divided doses, max dose at one time is 600mg. These can be found in women's multivitamins or as an additional supplement. Please check the label to ensure that your vitamin has enough of each and also check what you are eating to determine calcium supplement needed.    Foods and drinks  with calcium  Food Calcium in milligrams   Milk (skim, 2%, or whole; 8 oz [240 mL]) 300   Yogurt (6 oz [168 g]) 250   Orange juice (with calcium; 8 oz [240 mL]) 300   Tofu with calcium (0.5 cup [113 g]) 435   Cheese (1 oz [28 g]) 195 to 335 (hard cheese = higher calcium)   Cottage cheese (0.5 cup [113 g]) 130   Ice cream or frozen yogurt (0.5 cup [113 g]) 100   Fortified non-dairy milks (soy, oat, almond; 8 oz [240 mL]) 300 to 450   Beans (0.5 cup cooked [113 g]) 60 to 80   Dark, leafy green vegetables (0.5 cup cooked [113 g]) 50 to 135   Almonds (24 whole) 70   Orange (1 medium) 60

## 2025-03-17 NOTE — PROGRESS NOTES
Assessment & Plan     Encounter to establish care  Medical, surgical, family, and social histories discussed updated. Reviewed active healthcare problems. Medications reviewed.     Recurrent major depressive disorder, in remission  Stable.  Predominantly seasonal.  Could consider Wellbutrin.  Did not do well on Effexor.    Menopausal syndrome (hot flashes)  Failed Effexor.  Has been responsive to estradiol, taking every other day.  No contraindications.  If contraindication were to develop, could consider Veozah.  Mammogram up-to-date, awaiting diagnostic.    Motion sickness, subsequent encounter  Does not do well on scopolamine or meclizine.  Main symptom is nausea.  Will try Zofran.  If not effective, could try alternative.  - ondansetron (ZOFRAN) 4 MG tablet; Take 1 tablet (4 mg) by mouth every 8 hours as needed for nausea.    Laboratory examination ordered as part of a routine general medical examination  - CBC with Platelets & Differential; Future  - Comprehensive metabolic panel; Future  - TSH with free T4 reflex; Future  - Lipid Profile; Future  - CBC with Platelets & Differential  - Comprehensive metabolic panel  - TSH with free T4 reflex  - Lipid Profile    Encounter for vitamin deficiency screening  - Vitamin D Deficiency; Future  - Vitamin D Deficiency        The longitudinal plan of care for the diagnosis(es)/condition(s) as documented were addressed during this visit. Due to the added complexity in care, I will continue to support Luly in the subsequent management and with ongoing continuity of care.    Chiquita Mauricio is a 59 year old, presenting for the following health issues:  Establish Care and Depression        3/17/2025     1:50 PM   Additional Questions   Roomed by Ceci Bass   Accompanied by self         3/17/2025     1:50 PM   Patient Reported Additional Medications   Patient reports taking the following new medications multivitamin     History of Present Illness       Reason for  "visit:  Establish care with a new doctor. Review some preventative care. Discuss some ongoing conditions   She is taking medications regularly.      Hot flashes - on estradiol again. Gets insomnia. Gets hot flashes. Wakes her up at night. No breast cancer. Taking estradiol every other day. Effexor didn't work well -caused mood issues. No history of migraines with aura.     TMJ - clenches jaw. Has mouthguard. Gets massages. Due to see dentist.     Wondering about motion sickness medication.  Mostly nausea. Scolpamine leads to eye dilation. Gets \"weird\" on meclizine. [place]     Depression   How are you doing with your depression since your last visit? No change  Are you having other symptoms that might be associated with depression? No  Have you had a significant life event?  No   Are you feeling anxious or having panic attacks?   No- gets anxious when over thinks  Do you have any concerns with your use of alcohol or other drugs? No    Really only issue in the winter.   Tried effexor with hot flashes - not good for her     Social History     Tobacco Use    Smoking status: Never    Smokeless tobacco: Never    Tobacco comments:     no passive exposure   Vaping Use    Vaping status: Never Used   Substance Use Topics    Alcohol use: Yes     Comment: occasionally    Drug use: No         3/22/2024     9:07 AM 4/25/2024     8:25 AM 3/17/2025    12:27 PM   PHQ   PHQ-9 Total Score 6 6 4    Q9: Thoughts of better off dead/self-harm past 2 weeks Not at all Not at all Not at all       Patient-reported         3/8/2023    11:06 AM 10/6/2023     9:57 PM 3/17/2025    12:28 PM   SIMON-7 SCORE   Total Score 8 (mild anxiety) 5 (mild anxiety) 5 (mild anxiety)   Total Score 8 5 5        Patient-reported         3/17/2025    12:27 PM   Last PHQ-9   1.  Little interest or pleasure in doing things 0   2.  Feeling down, depressed, or hopeless 1   3.  Trouble falling or staying asleep, or sleeping too much 0   4.  Feeling " "tired or having little energy 1   5.  Poor appetite or overeating 1   6.  Feeling bad about yourself 0   7.  Trouble concentrating 1   8.  Moving slowly or restless 0   Q9: Thoughts of better off dead/self-harm past 2 weeks 0   PHQ-9 Total Score 4        Patient-reported         3/17/2025    12:28 PM   SIMON-7    1. Feeling nervous, anxious, or on edge 1   2. Not being able to stop or control worrying 1   3. Worrying too much about different things 1   4. Trouble relaxing 1   5. Being so restless that it is hard to sit still 1   6. Becoming easily annoyed or irritable 0   7. Feeling afraid, as if something awful might happen 0   SIMON-7 Total Score 5    If you checked any problems, how difficult have they made it for you to do your work, take care of things at home, or get along with other people? Not difficult at all       Patient-reported           Objective    /64 (BP Location: Left arm, Patient Position: Sitting, Cuff Size: Adult Regular)   Pulse 74   Temp (!) 96.7  F (35.9  C) (Tympanic)   Resp 16   Ht 1.727 m (5' 8\")   Wt 79.3 kg (174 lb 14.4 oz)   LMP 01/01/2011 (Approximate)   SpO2 98%   BMI 26.59 kg/m    Body mass index is 26.59 kg/m .    Physical Exam  Constitutional:       General: She is not in acute distress.     Appearance: Normal appearance.   HENT:      Right Ear: Tympanic membrane and ear canal normal.      Left Ear: Tympanic membrane and ear canal normal.      Nose: Nose normal.      Mouth/Throat:      Mouth: Mucous membranes are moist.      Pharynx: Oropharynx is clear.   Eyes:      Extraocular Movements: Extraocular movements intact.      Conjunctiva/sclera: Conjunctivae normal.   Neck:      Thyroid: No thyroid mass, thyromegaly or thyroid tenderness.   Cardiovascular:      Rate and Rhythm: Normal rate and regular rhythm.      Heart sounds: No murmur heard.  Pulmonary:      Effort: Pulmonary effort is normal.      Breath sounds: Normal breath sounds. No wheezing, rhonchi or rales. "   Musculoskeletal:      Cervical back: Neck supple.      Right lower leg: No edema.      Left lower leg: No edema.   Lymphadenopathy:      Cervical: No cervical adenopathy.   Neurological:      General: No focal deficit present.      Mental Status: She is alert and oriented to person, place, and time.   Psychiatric:         Mood and Affect: Mood normal.         Behavior: Behavior normal.            Results for orders placed or performed in visit on 03/17/25   CBC with Platelets & Differential     Status: None (In process)    Narrative    The following orders were created for panel order CBC with Platelets & Differential.  Procedure                               Abnormality         Status                     ---------                               -----------         ------                     CBC with platelets and ...[[id number]]                      In process                   Please view results for these tests on the individual orders.         Signed Electronically by: Carole To MD

## 2025-03-19 LAB — VIT D+METAB SERPL-MCNC: 42 NG/ML (ref 20–50)

## 2025-03-20 ENCOUNTER — HOSPITAL ENCOUNTER (OUTPATIENT)
Dept: ULTRASOUND IMAGING | Facility: HOSPITAL | Age: 60
Discharge: HOME | End: 2025-03-20
Attending: FAMILY MEDICINE
Payer: COMMERCIAL

## 2025-03-20 ENCOUNTER — HOSPITAL ENCOUNTER (OUTPATIENT)
Dept: MAMMOGRAPHY | Facility: OTHER | Age: 60
Discharge: HOME | End: 2025-03-20
Attending: FAMILY MEDICINE
Payer: COMMERCIAL

## 2025-03-20 DIAGNOSIS — R92.8 ABNORMAL FINDING ON BREAST IMAGING: ICD-10-CM

## 2025-03-20 PROCEDURE — G0279 TOMOSYNTHESIS, MAMMO: HCPCS | Mod: TC | Performed by: RADIOLOGY

## 2025-03-20 PROCEDURE — 76642 ULTRASOUND BREAST LIMITED: CPT | Mod: LT

## 2025-03-20 PROCEDURE — 77065 DX MAMMO INCL CAD UNI: CPT | Mod: TC | Performed by: RADIOLOGY

## 2025-04-01 ENCOUNTER — HOSPITAL ENCOUNTER (OUTPATIENT)
Dept: MAMMOGRAPHY | Facility: HOSPITAL | Age: 60
Discharge: HOME | End: 2025-04-01
Attending: SURGERY
Payer: COMMERCIAL

## 2025-04-01 ENCOUNTER — HOSPITAL ENCOUNTER (OUTPATIENT)
Facility: HOSPITAL | Age: 60
Discharge: HOME | End: 2025-04-01
Attending: STUDENT IN AN ORGANIZED HEALTH CARE EDUCATION/TRAINING PROGRAM | Admitting: STUDENT IN AN ORGANIZED HEALTH CARE EDUCATION/TRAINING PROGRAM
Payer: COMMERCIAL

## 2025-04-01 ENCOUNTER — HOSPITAL ENCOUNTER (OUTPATIENT)
Dept: MAMMOGRAPHY | Facility: HOSPITAL | Age: 60
Discharge: HOME | End: 2025-04-01
Attending: STUDENT IN AN ORGANIZED HEALTH CARE EDUCATION/TRAINING PROGRAM
Payer: COMMERCIAL

## 2025-04-01 DIAGNOSIS — R92.1 CALCIFICATION OF LEFT BREAST: ICD-10-CM

## 2025-04-01 PROCEDURE — 999N000065 MA POST PROCEDURE LEFT

## 2025-04-01 PROCEDURE — 76098 X-RAY EXAM SURGICAL SPECIMEN: CPT

## 2025-04-01 PROCEDURE — 88305 TISSUE EXAM BY PATHOLOGIST: CPT | Mod: TC | Performed by: SURGERY

## 2025-04-01 PROCEDURE — 250N000011 HC RX IP 250 OP 636: Performed by: STUDENT IN AN ORGANIZED HEALTH CARE EDUCATION/TRAINING PROGRAM

## 2025-04-01 PROCEDURE — 88305 TISSUE EXAM BY PATHOLOGIST: CPT | Mod: 26 | Performed by: PATHOLOGY

## 2025-04-01 PROCEDURE — 250N000009 HC RX 250: Performed by: STUDENT IN AN ORGANIZED HEALTH CARE EDUCATION/TRAINING PROGRAM

## 2025-04-01 PROCEDURE — 19081 BX BREAST 1ST LESION STRTCTC: CPT | Mod: LT

## 2025-04-01 RX ORDER — LIDOCAINE HYDROCHLORIDE 10 MG/ML
5-10 INJECTION, SOLUTION EPIDURAL; INFILTRATION; INTRACAUDAL; PERINEURAL
Status: COMPLETED | OUTPATIENT
Start: 2025-04-01 | End: 2025-04-01

## 2025-04-01 RX ADMIN — LIDOCAINE HYDROCHLORIDE,EPINEPHRINE BITARTRATE 20 ML: 20; .01 INJECTION, SOLUTION INFILTRATION; PERINEURAL at 09:46

## 2025-04-01 RX ADMIN — LIDOCAINE HYDROCHLORIDE 3 ML: 10 INJECTION, SOLUTION EPIDURAL; INFILTRATION; INTRACAUDAL; PERINEURAL at 09:45

## 2025-04-01 ASSESSMENT — ACTIVITIES OF DAILY LIVING (ADL)
ADLS_ACUITY_SCORE: 41
ADLS_ACUITY_SCORE: 41

## 2025-04-01 NOTE — PROGRESS NOTES
Patient here for stereotactic guided biopsy of left breast.  Procedure reviewed with patient by writer and radiologist, questions answered.  Time out performed prior to biopsy.  Biopsy completed by radiologist, clip placed.  Pressure held to biopsy site for 13 minutes.  Medipore dressing and steri strips  applied.   Post clip mammogram completed.  Sports bra and ice pack applied over dressing.  Discharge instructions reviewed with patient, patient verbalizes understanding of instructions.  Discharged to home in stable condition with no evidence of bleeding from biopsy site.      Cecilia GLASER RN

## 2025-04-02 ENCOUNTER — TELEPHONE (OUTPATIENT)
Dept: MAMMOGRAPHY | Facility: OTHER | Age: 60
End: 2025-04-02

## 2025-04-02 LAB
PATH REPORT.COMMENTS IMP SPEC: NORMAL
PATH REPORT.FINAL DX SPEC: NORMAL
PATH REPORT.GROSS SPEC: NORMAL
PATH REPORT.MICROSCOPIC SPEC OTHER STN: NORMAL
PATH REPORT.RELEVANT HX SPEC: NORMAL
PHOTO IMAGE: NORMAL

## 2025-04-02 NOTE — TELEPHONE ENCOUNTER
Called patient to check on status post stereotactic breast biopsy.  Patient reports a little bit of pain moreso tender/sore.  Patient is using Tylenol.  Patient reports no bruising or bleeding.      Cecilia GLASER RN

## 2025-04-08 DIAGNOSIS — H91.93 DECREASED HEARING OF BOTH EARS: Primary | ICD-10-CM

## 2025-04-15 ENCOUNTER — OFFICE VISIT (OUTPATIENT)
Dept: OTOLARYNGOLOGY | Facility: OTHER | Age: 60
End: 2025-04-15
Attending: AUDIOLOGIST
Payer: COMMERCIAL

## 2025-04-15 ENCOUNTER — OFFICE VISIT (OUTPATIENT)
Dept: AUDIOLOGY | Facility: OTHER | Age: 60
End: 2025-04-15
Attending: AUDIOLOGIST
Payer: COMMERCIAL

## 2025-04-15 VITALS
WEIGHT: 174.82 LBS | RESPIRATION RATE: 16 BRPM | HEIGHT: 68 IN | DIASTOLIC BLOOD PRESSURE: 76 MMHG | SYSTOLIC BLOOD PRESSURE: 119 MMHG | TEMPERATURE: 96.7 F | HEART RATE: 68 BPM | OXYGEN SATURATION: 99 % | BODY MASS INDEX: 26.5 KG/M2

## 2025-04-15 DIAGNOSIS — H93.8X2 SENSATION OF PLUGGED EAR ON LEFT SIDE: Primary | ICD-10-CM

## 2025-04-15 DIAGNOSIS — H93.8X2 SENSATION OF FULLNESS IN EAR, LEFT: Primary | ICD-10-CM

## 2025-04-15 DIAGNOSIS — H90.42 SENSORINEURAL HEARING LOSS (SNHL) OF LEFT EAR WITH UNRESTRICTED HEARING OF RIGHT EAR: ICD-10-CM

## 2025-04-15 DIAGNOSIS — H91.93 DECREASED HEARING OF BOTH EARS: ICD-10-CM

## 2025-04-15 DIAGNOSIS — M26.609 TMJ (TEMPOROMANDIBULAR JOINT SYNDROME): ICD-10-CM

## 2025-04-15 DIAGNOSIS — Z98.890 HX OF MYRINGOTOMY: ICD-10-CM

## 2025-04-15 ASSESSMENT — PAIN SCALES - GENERAL: PAINLEVEL_OUTOF10: NO PAIN (0)

## 2025-04-15 NOTE — PROGRESS NOTES
Audiology Evaluation Completed. Please refer SCANNED AUDIOGRAM and/or TYMPANOGRAM for BACKGROUND, RESULTS, RECOMMENDATIONS.      Minnie GLASER, [facility]  Audiologist #[id number]

## 2025-04-15 NOTE — PROGRESS NOTES
Otolaryngology Consultation    Patient: Luly Freeman  : 1965    Patient presents with:  Hearing Problem  Dizziness: HEA/Dizzy      HPI:  Luly Freeman is a 59 year old female seen today for hearing evaluation, ear fullness. She has felt there is fullness. SHe has felt ongoing fullness to her left ear along with some facial fullness throughout the left TMJ/ region.   She reports it has been the left ear persistent for quie sometime. She feels popping while sharp pain and then plugs.   She does report some dizziness unsteadiness, for 3-4 months. Symptoms are not bothersome during the day and does not bother her at bedtime. Mostly in the AM upon first awakening.    She has felt aching/ pressure along left ear, persistent.   She had significant pain where was not able to use opening of her jaw about 1 month ago. She had worked with massage and had improvement.     Sinuses have been improved since completing the allergy therapy.   Her allergies seem to be manageable. Reports treated and managed without     No worrisome migraines.       Hx of left tube placement following c/o otalgia. Tube was placed in  with Dr. Elliott, subsequently removed in office in . No COM.     Denies COM.   Denies otalgia, otorrhea  Denies worrisome tinnitus  Denies fluctuating hearing loss or tinnitus.   Denies vertigo or facial paraesthesia.   Denies dysphagia.   She has felt aural fullness  No otologic family hx.   No chronic noise exposure.       Audiogram 4/15/25  Type Ad tympanograms  Thresholds are normal range right ear and normal sloping to mild sensorineural hearing loss  SRT=PTA  WRS-  Right- 100%@55 dB  Left- 100% @55 dB  Current Outpatient Rx   Medication Sig Dispense Refill    estradiol (ESTRACE) 0.5 MG tablet Take 1 tablet (0.5 mg) by mouth daily TAKE 1 TABLET(0.5 MG) BY MOUTH DAILY 90 tablet 4    Multiple Vitamins-Minerals (MULTIVITAMIN WOMENS 50+ ADV PO) Take by mouth. Per patient      ondansetron (ZOFRAN) 4  "MG tablet Take 1 tablet (4 mg) by mouth every 8 hours as needed for nausea. 20 tablet 0       Allergies: Patient has no known allergies.     Past Medical History:   Diagnosis Date    Allergic rhinitis 06/22/2011    Depressive disorder 2008 ??    Lump or mass in breast 03/12/2007    PONV (postoperative nausea and vomiting)     Recurrent sinusitis 07/20/2011    Varicose veins 06/22/2011       Past Surgical History:   Procedure Laterality Date    BREAST SURGERY  03/2007    BUNIONECTOMY Right 10/22/2018    Procedure: RIGHT FOOT 1ST METATARSAL DISTAL CHEVERON OSTEOTOMY AND LATERAL SOFT FISSUE RELEASE;  Surgeon: Netta Soni DPM;  Location: [facility]    BUNIONECTOMY RT/LT  04/2008    COLONOSCOPY N/A 10/30/2017    Procedure: COLONOSCOPY;  WHOLE COLON COLONOSCOPY ;  Surgeon: Erma Jenkins MD;  Location: [facility]    ENT SURGERY  10/2013    nose surgery, Dr. Elliott    EXCISE MASS LOWER EXTREMITY Right 05/22/2017    Procedure: EXCISE MASS LOWER EXTREMITY;  EXCISION SOFT TISSUE MASS RIGHT LOWER LEG;  Surgeon: Erma Jenkins MD;  Location: [facility]    EYE SURGERY  05/2018    Lasik surgery    GYN SURGERY  2011    still has cervix; partial hysterectomy    laparoscopic supracervical hysterectomy  2011    Fibroids, ovaries intact    Left Breast Bx  03/2007    Fibrocystic breast disease    PE tube placement      ventilation tube, left  2011       ENT family history reviewed    Social History     Tobacco Use    Smoking status: Never    Smokeless tobacco: Never    Tobacco comments:     no passive exposure   Vaping Use    Vaping status: Never Used   Substance Use Topics    Alcohol use: Yes     Comment: occasionally    Drug use: No       Review of Systems  ROS: 10 point ROS neg other than the symptoms noted above in the HPI     Physical Exam  /76 (BP Location: Left arm, Patient Position: Sitting, Cuff Size: Adult Regular)   Pulse 68   Temp (!) 96.7  F (35.9  C) (Tympanic)   Resp 16   Ht 1.727 m (5' 7.99\")   Wt 79.3 " kg (174 lb 13.2 oz)   LMP 01/01/2011 (Approximate)   SpO2 99%   BMI 26.59 kg/m      General - The patient is well nourished and well developed, and appears to have good nutritional status.  Alert and oriented to person and place, answers questions and cooperates with examination appropriately.   Head and Face - Normocephalic and atraumatic, with no gross asymmetry noted.  The facial nerve is intact, with strong symmetric movements.  Voice and Breathing - The patient was breathing comfortably without the use of accessory muscles. There was no wheezing, stridor, or stertor.  The patients voice was clear and strong, and had appropriate pitch and quality.  Ears -T ears examined with otoscope and under otologic microscopy he external auditory canals are patent, the tympanic membranes are intact without effusion, retraction or mass.  Bony landmarks are intact.  Eyes - Extraocular movements intact, and the pupils were reactive to light.  Sclera were not icteric or injected, conjunctiva were pink and moist.  Mouth - Examination of the oral cavity showed pink, healthy oral mucosa. No lesions or ulcerations noted.  The tongue was mobile and midline, and the dentition were in good condition.    Throat - The walls of the oropharynx were smooth, pink, moist, symmetric, and had no lesions or ulcerations.  The tonsillar pillars and soft palate were symmetric.  The uvula was midline on elevation.    Neck - Normal midline excursion of the laryngotracheal complex during swallowing.  Full range of motion on passive movement.  Palpation of the occipital, submental, submandibular, internal jugular chain, and supraclavicular nodes did not demonstrate any abnormal lymph nodes or masses.  Palpation of the thyroid was soft and smooth, with no nodules or goiter appreciated.  The trachea was mobile and midline.  Nose - External contour is symmetric, no gross deflection or scars.  Nasal mucosa is pink and moist with no abnormal mucus.     Bilateral TMJ tightness.  Oral tenderness on left masseter    Impression and Plan- Luly Freeman is a 59 year old female with:    ICD-10-CM    1. Sensation of fullness in ear, left  H93.8X2 CT Temporal Bones wo Contrast      2. TMJ (temporomandibular joint syndrome)  M26.609 Physical Therapy  Referral     CT Temporal Bones wo Contrast      3. Hx of myringotomy/ tube left  Z98.890 CT Temporal Bones wo Contrast            Complete CT of temporal bone, mastoid, and middle ear.   Referral to physical therapy for TMJ  If there is no improvement consider additional options.  Briefly discussed option of completing myringotomy monitor for symptom relief.  She has a history of left and possibly tube over several years ago but does not recall if there is notable improvement.    She was reassured today there is no effusion or retraction.  Recommended TMJ physical therapy a dental guard  Follow-up in 2 to 3 months for repeat exam if there is no improvement we will need nasal endoscopy    Follow up in 2-3 months        Mary Ashley PA-C  ENT  [facility], [place]

## 2025-04-15 NOTE — PATIENT INSTRUCTIONS
Complete CT of temporal bone, mastoid, and middle ear.   Referral to physical therapy for TMJ  If there is no improvement consider additional options.   Ear does appear well, no fluid or infection seen on exam  Follow up in 2-3 months      Thank you for allowing [facility] and our ENT team to participate in your care.  If your medications are too expensive, please give the nurse a call.  We can possibly change this medication.  If you have a scheduling or an appointment question please contact our Health Unit Coordinator at their direct line [phone].   ALL nursing questions or concerns can be directed to your ENT nurse, Daniela at: [phone].

## 2025-04-15 NOTE — LETTER
4/15/2025      Luly Freeamn  [address]  [place] [address]      Dear Colleague,    Thank you for referring your patient, Luly Freeman, to the [facility] [place]. Please see a copy of my visit note below.    Otolaryngology Consultation    Patient: Luly Freeman  : 1965    Patient presents with:  Hearing Problem  Dizziness: HEA/Dizzy      HPI:  Luly Freeman is a 59 year old female seen today for hearing evaluation, ear fullness. She has felt there is fullness. SHe has felt ongoing fullness to her left ear along with some facial fullness throughout the left TMJ/ region.   She reports it has been the left ear persistent for quie sometime. She feels popping while sharp pain and then plugs.   She does report some dizziness unsteadiness, for 3-4 months. Symptoms are not bothersome during the day and does not bother her at bedtime. Mostly in the AM upon first awakening.    She has felt aching/ pressure along left ear, persistent.   She had significant pain where was not able to use opening of her jaw about 1 month ago. She had worked with massage and had improvement.     Sinuses have been improved since completing the allergy therapy.   Her allergies seem to be manageable. Reports treated and managed without     No worrisome migraines.       Hx of left tube placement following c/o otalgia. Tube was placed in  with Dr. Elliott, subsequently removed in office in . No COM.     Denies COM.   Denies otalgia, otorrhea  Denies worrisome tinnitus  Denies fluctuating hearing loss or tinnitus.   Denies vertigo or facial paraesthesia.   Denies dysphagia.   She has felt aural fullness  No otologic family hx.   No chronic noise exposure.       Audiogram 4/15/25  Type Ad tympanograms  Thresholds are normal range right ear and normal sloping to mild sensorineural hearing loss  SRT=PTA  WRS-  Right- 100%@55 dB  Left- 100% @55 dB  Current Outpatient Rx   Medication Sig Dispense Refill     estradiol  (ESTRACE) 0.5 MG tablet Take 1 tablet (0.5 mg) by mouth daily TAKE 1 TABLET(0.5 MG) BY MOUTH DAILY 90 tablet 4     Multiple Vitamins-Minerals (MULTIVITAMIN WOMENS 50+ ADV PO) Take by mouth. Per patient       ondansetron (ZOFRAN) 4 MG tablet Take 1 tablet (4 mg) by mouth every 8 hours as needed for nausea. 20 tablet 0       Allergies: Patient has no known allergies.     Past Medical History:   Diagnosis Date     Allergic rhinitis 06/22/2011     Depressive disorder 2008 ??     Lump or mass in breast 03/12/2007     PONV (postoperative nausea and vomiting)      Recurrent sinusitis 07/20/2011     Varicose veins 06/22/2011       Past Surgical History:   Procedure Laterality Date     BREAST SURGERY  03/2007     BUNIONECTOMY Right 10/22/2018    Procedure: RIGHT FOOT 1ST METATARSAL DISTAL CHEVERON OSTEOTOMY AND LATERAL SOFT FISSUE RELEASE;  Surgeon: Netta Soni DPM;  Location: [facility]     BUNIONECTOMY RT/LT  04/2008     COLONOSCOPY N/A 10/30/2017    Procedure: COLONOSCOPY;  WHOLE COLON COLONOSCOPY ;  Surgeon: Erma Jenkins MD;  Location: [facility]     ENT SURGERY  10/2013    nose surgery, Dr. Elliott     EXCISE MASS LOWER EXTREMITY Right 05/22/2017    Procedure: EXCISE MASS LOWER EXTREMITY;  EXCISION SOFT TISSUE MASS RIGHT LOWER LEG;  Surgeon: Erma Jenkins MD;  Location: [facility]     EYE SURGERY  05/2018    Lasik surgery     GYN SURGERY  2011    still has cervix; partial hysterectomy     laparoscopic supracervical hysterectomy  2011    Fibroids, ovaries intact     Left Breast Bx  03/2007    Fibrocystic breast disease     PE tube placement       ventilation tube, left  2011       ENT family history reviewed    Social History     Tobacco Use     Smoking status: Never     Smokeless tobacco: Never     Tobacco comments:     no passive exposure   Vaping Use     Vaping status: Never Used   Substance Use Topics     Alcohol use: Yes     Comment: occasionally     Drug use: No       Review of Systems  ROS: 10 point ROS neg  "other than the symptoms noted above in the HPI     Physical Exam  /76 (BP Location: Left arm, Patient Position: Sitting, Cuff Size: Adult Regular)   Pulse 68   Temp (!) 96.7  F (35.9  C) (Tympanic)   Resp 16   Ht 1.727 m (5' 7.99\")   Wt 79.3 kg (174 lb 13.2 oz)   LMP 01/01/2011 (Approximate)   SpO2 99%   BMI 26.59 kg/m      General - The patient is well nourished and well developed, and appears to have good nutritional status.  Alert and oriented to person and place, answers questions and cooperates with examination appropriately.   Head and Face - Normocephalic and atraumatic, with no gross asymmetry noted.  The facial nerve is intact, with strong symmetric movements.  Voice and Breathing - The patient was breathing comfortably without the use of accessory muscles. There was no wheezing, stridor, or stertor.  The patients voice was clear and strong, and had appropriate pitch and quality.  Ears -T ears examined with otoscope and under otologic microscopy he external auditory canals are patent, the tympanic membranes are intact without effusion, retraction or mass.  Bony landmarks are intact.  Eyes - Extraocular movements intact, and the pupils were reactive to light.  Sclera were not icteric or injected, conjunctiva were pink and moist.  Mouth - Examination of the oral cavity showed pink, healthy oral mucosa. No lesions or ulcerations noted.  The tongue was mobile and midline, and the dentition were in good condition.    Throat - The walls of the oropharynx were smooth, pink, moist, symmetric, and had no lesions or ulcerations.  The tonsillar pillars and soft palate were symmetric.  The uvula was midline on elevation.    Neck - Normal midline excursion of the laryngotracheal complex during swallowing.  Full range of motion on passive movement.  Palpation of the occipital, submental, submandibular, internal jugular chain, and supraclavicular nodes did not demonstrate any abnormal lymph nodes or masses.  " Palpation of the thyroid was soft and smooth, with no nodules or goiter appreciated.  The trachea was mobile and midline.  Nose - External contour is symmetric, no gross deflection or scars.  Nasal mucosa is pink and moist with no abnormal mucus.    Bilateral TMJ tightness.  Oral tenderness on left masseter    Impression and Plan- Luly Freeman is a 59 year old female with:    ICD-10-CM    1. Sensation of fullness in ear, left  H93.8X2 CT Temporal Bones wo Contrast      2. TMJ (temporomandibular joint syndrome)  M26.609 Physical Therapy  Referral     CT Temporal Bones wo Contrast      3. Hx of myringotomy/ tube left  Z98.890 CT Temporal Bones wo Contrast            Complete CT of temporal bone, mastoid, and middle ear.   Referral to physical therapy for TMJ  If there is no improvement consider additional options.  Briefly discussed option of completing myringotomy monitor for symptom relief.  She has a history of left and possibly tube over several years ago but does not recall if there is notable improvement.    She was reassured today there is no effusion or retraction.  Recommended TMJ physical therapy a dental guard  Follow-up in 2 to 3 months for repeat exam if there is no improvement we will need nasal endoscopy    Follow up in 2-3 months        Mary Ashley PA-C  ENT  [facility], [place]      Again, thank you for allowing me to participate in the care of your patient.        Sincerely,        Mary Ashley PA-C    Electronically signed

## 2025-04-16 ENCOUNTER — TELEPHONE (OUTPATIENT)
Dept: OTOLARYNGOLOGY | Facility: OTHER | Age: 60
End: 2025-04-16

## 2025-04-16 NOTE — CONFIDENTIAL NOTE
April 16, 2025    Left message for patient to return call to schedule 2-3 month with Mary MASTERS.    -Claire Swanson on 4/16/2025 at 8:47 AM

## 2025-04-29 ENCOUNTER — HOSPITAL ENCOUNTER (OUTPATIENT)
Dept: CT IMAGING | Facility: HOSPITAL | Age: 60
Discharge: HOME | End: 2025-04-29
Attending: PHYSICIAN ASSISTANT | Admitting: RADIOLOGY
Payer: COMMERCIAL

## 2025-04-29 DIAGNOSIS — Z98.890 HX OF MYRINGOTOMY: ICD-10-CM

## 2025-04-29 DIAGNOSIS — M26.609 TMJ (TEMPOROMANDIBULAR JOINT SYNDROME): ICD-10-CM

## 2025-04-29 DIAGNOSIS — H93.8X2 SENSATION OF FULLNESS IN EAR, LEFT: ICD-10-CM

## 2025-04-29 PROCEDURE — 70480 CT ORBIT/EAR/FOSSA W/O DYE: CPT

## 2025-04-29 PROCEDURE — 70480 CT ORBIT/EAR/FOSSA W/O DYE: CPT | Mod: 26 | Performed by: RADIOLOGY

## 2025-06-29 DIAGNOSIS — N95.1 MENOPAUSAL SYNDROME (HOT FLASHES): ICD-10-CM

## 2025-07-01 RX ORDER — ESTRADIOL 0.5 MG/1
0.5 TABLET ORAL DAILY
Qty: 90 TABLET | Refills: 4 | OUTPATIENT
Start: 2025-07-01

## 2025-07-01 NOTE — TELEPHONE ENCOUNTER
[id number] sent Rx request for the following:      Requested Prescriptions   Pending Prescriptions Disp Refills    estradiol (ESTRACE) 0.5 MG tablet [Pharmacy Med Name: ESTRADIOL 0.5MG TABLET] 90 tablet 4     Sig: TAKE 1 TABLET (0.5 MG) BY MOUTH DAILY       Hormone Replacement Therapy Failed - 7/1/2025 11:52 AM        Failed - Recent (12 month) or future (90 days) visit with authorizing provider's specialty (provided they have been seen in the past 15 months)     The patient must have completed an in-person or virtual visit within the past 12 months or has a future visit scheduled within the next 90 days with the authorizing provider s specialty.  Urgent care and e-visits do not qualify as an office visit for this protocol.     Menopausal syndrome (hot flashes) [N95.1]     Last Prescription Date:   4/25/24  Last Fill Qty/Refills:         90, R-4    Last Office Visit:              4/25/24 (dx discussed)    Future Office visit:             Next 5 appointments (look out 90 days)      Aug 08, 2025 8:30 AM  (Arrive by 8:15 AM)  Provider Visit with Carole To MD  [facility] [place] ([facility] [place] ) [address]  [place] [id number] [phone]           Per chart review, PCP elsewhere. Pharmacy notifed.     Jamie Miller RN on 7/1/2025 at 11:54 AM

## 2025-07-10 DIAGNOSIS — N95.1 MENOPAUSAL SYNDROME (HOT FLASHES): ICD-10-CM

## 2025-07-16 RX ORDER — ESTRADIOL 0.5 MG/1
0.5 TABLET ORAL DAILY
Qty: 90 TABLET | Refills: 4 | OUTPATIENT
Start: 2025-07-16

## 2025-07-16 NOTE — TELEPHONE ENCOUNTER
[facility] sent Rx request for the following:      Requested Prescriptions   Pending Prescriptions Disp Refills    estradiol (ESTRACE) 0.5 MG tablet [Pharmacy Med Name: ESTRADIOL 0.5MG TABLET] 90 tablet 4     Sig: TAKE 1 TABLET (0.5 MG) BY MOUTH DAILY       Hormone Replacement Therapy Failed - 7/16/2025 11:26 AM        Failed - Recent (12 month) or future (90 days) visit with authorizing provider's specialty (provided they have been seen in the past 15 months)     The patient must have completed an in-person or virtual visit within the past 12 months or has a future visit scheduled within the next 90 days with the authorizing provider s specialty.  Urgent care and e-visits do not qualify as an office visit for this protocol.          Last Prescription Date:   4/25/24  Last Fill Qty/Refills:         90, R-4    Last Office Visit:              4/25/24   Future Office visit:             Next 5 appointments (look out 90 days)      Aug 08, 2025 8:30 AM  (Arrive by 8:15 AM)  Provider Visit with Carole To MD  [facility] [place] ([facility] [place] ) [address]  [place] [address]  [phone]           Refused: PCP elsewhere.     Kamala Scott RN on 7/16/2025 at 11:29 AM

## 2025-07-17 DIAGNOSIS — N95.1 MENOPAUSAL SYNDROME (HOT FLASHES): ICD-10-CM

## 2025-07-17 RX ORDER — ESTRADIOL 0.5 MG/1
0.5 TABLET ORAL EVERY OTHER DAY
Qty: 45 TABLET | Refills: 1 | Status: SHIPPED | OUTPATIENT
Start: 2025-07-17

## 2025-07-17 NOTE — TELEPHONE ENCOUNTER
Estrace 0.5 MG      Last Written Prescription Date:  04/28/24- signed by Estella Gamble  Last Fill Quantity: 90,   # refills: 4  Last Office Visit: 03/17/25  Future Office visit:    Next 5 appointments (look out 90 days)      Aug 08, 2025 8:30 AM  (Arrive by 8:15 AM)  Provider Visit with Carole To MD  [facility] [place] ([facility] [place] ) [address]  [place] [address]  [phone]             Routing refill request to provider for review/approval because:

## 2025-07-28 ENCOUNTER — THERAPY VISIT (OUTPATIENT)
Dept: PHYSICAL THERAPY | Facility: HOSPITAL | Age: 60
End: 2025-07-28
Attending: STUDENT IN AN ORGANIZED HEALTH CARE EDUCATION/TRAINING PROGRAM
Payer: COMMERCIAL

## 2025-07-28 DIAGNOSIS — M26.609 TMJ (TEMPOROMANDIBULAR JOINT SYNDROME): Primary | ICD-10-CM

## 2025-07-28 PROCEDURE — 97110 THERAPEUTIC EXERCISES: CPT | Mod: GP

## 2025-07-28 PROCEDURE — 97530 THERAPEUTIC ACTIVITIES: CPT | Mod: GP

## 2025-07-28 PROCEDURE — 97161 PT EVAL LOW COMPLEX 20 MIN: CPT | Mod: GP

## 2025-07-28 NOTE — PROGRESS NOTES
PHYSICAL THERAPY EVALUATION  Type of Visit: Evaluation       Fall Risk Screen:  Have you fallen 2 or more times in the past year?: No  Have you fallen and had an injury in the past year?: No    Subjective         Presenting condition or subjective complaint: Jaw problems. Very bad headaches. Sometimes cant open my jaw enough to bite into toast. Occasional jaw clicking  Date of onset: 04/15/25    Relevant medical history:     PAST MEDICAL HISTORY:   Past Medical History:   Diagnosis Date    Allergic rhinitis 06/22/2011    Depressive disorder 2008 ??    Lump or mass in breast 03/12/2007    PONV (postoperative nausea and vomiting)     Recurrent sinusitis 07/20/2011    Varicose veins 06/22/2011       PAST SURGICAL HISTORY:   Past Surgical History:   Procedure Laterality Date    BREAST SURGERY  03/2007    BUNIONECTOMY Right 10/22/2018    Procedure: RIGHT FOOT 1ST METATARSAL DISTAL CHEVERON OSTEOTOMY AND LATERAL SOFT FISSUE RELEASE;  Surgeon: Netta Soni DPM;  Location: [facility]    BUNIONECTOMY RT/LT  04/2008    COLONOSCOPY N/A 10/30/2017    Procedure: COLONOSCOPY;  WHOLE COLON COLONOSCOPY ;  Surgeon: Erma Jenkins MD;  Location: [facility]    ENT SURGERY  10/2013    nose surgery, Dr. Elliott    EXCISE MASS LOWER EXTREMITY Right 05/22/2017    Procedure: EXCISE MASS LOWER EXTREMITY;  EXCISION SOFT TISSUE MASS RIGHT LOWER LEG;  Surgeon: Erma Jenkins MD;  Location: [facility]    EYE SURGERY  05/2018    Lasik surgery    GYN SURGERY  2011    still has cervix; partial hysterectomy    laparoscopic supracervical hysterectomy  2011    Fibroids, ovaries intact    Left Breast Bx  03/2007    Fibrocystic breast disease    PE tube placement      ventilation tube, left  2011       FAMILY HISTORY:   Family History   Problem Relation Age of Onset    No Known Problems Mother     C.A.D. Father     Thyroid Disease Father         nodule; surgical removal    Thyroid Disease Sister     Neurologic Disorder Brother         unknown     Other - See Comments Maternal Grandfather         possible MI COD    Thyroid Disease Paternal Grandmother     Other - See Comments Paternal Grandfather         possible MI COD    Other Cancer Cousin         Pancreatic cancer; 6 month    Other Cancer Maternal Aunt         pancreatic cancer; on year 11 (2022)    Diabetes No family hx of     Cerebrovascular Disease No family hx of     Other Cancer No family hx of        SOCIAL HISTORY:   Social History     Tobacco Use    Smoking status: Never    Smokeless tobacco: Never    Tobacco comments:     no passive exposure   Substance Use Topics    Alcohol use: Yes     Comment: occasionally         Prior diagnostic imaging/testing results: CT scan     Prior therapy history for the same diagnosis, illness or injury: No      Prior Level of Function  Transfers: Independent  Ambulation: Independent    Living Environment  Social support: With a significant other or spouse   Type of home: House; 1 level; Basement   Stairs to enter the home: Yes 3 Is there a railing: Yes     Ramp: No   Stairs inside the home: Yes   Is there a railing: Yes       Employment: Yes     Patient goals for therapy: Have less pain/earache    Pain assessment: Pain present     Objective     Headache- Yes, elevated in the morning  Jaw Noises- Yes, clicking bilaterally  Modified Diet- Yes  Parafunctal Habits- Yes, night-time bruxism  Sleep- Non-restorative  Caffeine- Coffee  Alcohol- No concern  Exercise-  15-20mins of stretching in AM  Stress- Low- good coping mechanisms      OBJECTIVE   Observation: Patient presents to department in no acute distress.     Palpation:   Deep [place]- Right ++, Left +  Temporalis- Right +, Left +  EO MP- Right ++, Left ++  SCM- Right +, Left +  Suboccipitals- Right ++, Left ++    Range of Motion:   TMJ Range of Motion -  Pain Free Opening- 32mm  Maximum Opening- 45mm+ (end feel not encouraged)  Left Laterotrusion- 5mm (mostly protrusive)  Right Laterotrusion-  10mm  Opening Pattern- Rightward deviation, early and excessive protrusion, unable to open without protrusion when coached at this time  Posture: Forward head, protracted shoulders     Neurological Assessment:   Myotomes:   Right upper extremity: within functional limits and symmetrical   Left upper extremity: within functional limits and symmetrical     Dermatomes:   Right upper extremity: within functional limits and symmetrical   Left upper extremity: within functional limits and symmetrical     UE Neurodynamics: Deferred    Cervical Spine Range of Motion   Active Motion: WFL for TMJ evaluation and treatment, tightness and restriction noted with sidebending in BL UTs  Right upper extremity range of motion: WNL   Left upper extremity range of motion: WNL     Right upper extremity strength: WNL except lower trapezius 4/5 and middle trapezius 4/5  Left upper extremity strength: WNL except lower trapezius 4/5 and middle trapezius 4/5  Deep Neck Flexor Endurance Test: <5 seconds prior to substitution       Assessment & Plan   CLINICAL IMPRESSIONS  Medical Diagnosis: TMJ (temporomandibular joint syndrome) (M26.609)    Treatment Diagnosis: TMJ (temporomandibular joint syndrome) (M26.609)   Impression/Assessment: Patient is a 60 year old female with TMJ dysfunction with associated ear pain/irritation complaints consistent with BL disc displacement with reduction and intermittent closed lock; BL myalgia of masticatory and cervical musculature, and possible TMD related/driven headaches  The following significant findings have been identified: Pain, Decreased ROM/flexibility, Impaired muscle performance, Decreased activity tolerance, and Impaired posture. These impairments interfere with their ability to perform self care tasks and recreational activities as compared to previous level of function.     Clinical Decision Making (Complexity):  Clinical Presentation: Stable/Uncomplicated  Clinical Presentation Rationale: based  on medical and personal factors listed in PT evaluation  Clinical Decision Making (Complexity): Low complexity    PLAN OF CARE  Treatment Interventions:  Modalities: Cryotherapy, Dry Needling, E-stim, Hot Pack, Mechanical Traction, Ultrasound  Interventions: Manual Therapy, Neuromuscular Re-education, Therapeutic Activity, Therapeutic Exercise, Self-Care/Home Management    Long Term Goals     PT Goal 1  Goal Identifier: STG 1  Goal Description: Patient will be independent with a short-term home exercise program.  Target Date: 08/25/25  PT Goal 2  Goal Identifier: STG 2  Goal Description: Patient will understand and demonstrate improved posture and techniques such that patient places less strain over cervical spine.  Target Date: 08/25/25  PT Goal 3  Goal Identifier: LTG 1  Goal Description: Improve score on utilized outcome measures to correlate with clinically significant change.  Target Date: 09/22/25  PT Goal 4  Goal Identifier: LTG 2  Goal Description: Patient will endorse confidence in ability to manage home exercise program independently to promote continued progression and management of jaw and neck pain symptoms following discharge from PT services.  Target Date: 09/22/25      Frequency of Treatment: 1x/week tapered to discharge  Duration of Treatment: 8 weeks    Education Assessment:   Learner/Method: Patient;Listening;Reading;Demonstration;Pictures/Video;No Barriers to Learning    Risks and benefits of evaluation/treatment have been explained.   Patient/Family/caregiver agrees with Plan of Care.     Evaluation Time:             Signing Clinician:   Velma Casey (Tom), CLARICET, NRP, ITPT  TMJ and Orofacial Pain Specialist  Certified in Dry Needling and Blood Flow Restriction

## 2025-08-04 ENCOUNTER — THERAPY VISIT (OUTPATIENT)
Dept: PHYSICAL THERAPY | Facility: HOSPITAL | Age: 60
End: 2025-08-04
Attending: STUDENT IN AN ORGANIZED HEALTH CARE EDUCATION/TRAINING PROGRAM
Payer: COMMERCIAL

## 2025-08-04 DIAGNOSIS — M26.609 TMJ (TEMPOROMANDIBULAR JOINT SYNDROME): ICD-10-CM

## 2025-08-04 PROCEDURE — 97530 THERAPEUTIC ACTIVITIES: CPT | Mod: GP

## 2025-08-04 PROCEDURE — 97110 THERAPEUTIC EXERCISES: CPT | Mod: GP

## 2025-08-04 PROCEDURE — 97140 MANUAL THERAPY 1/> REGIONS: CPT | Mod: GP

## 2025-08-11 ENCOUNTER — THERAPY VISIT (OUTPATIENT)
Dept: PHYSICAL THERAPY | Facility: HOSPITAL | Age: 60
End: 2025-08-11
Attending: STUDENT IN AN ORGANIZED HEALTH CARE EDUCATION/TRAINING PROGRAM
Payer: COMMERCIAL

## 2025-08-11 DIAGNOSIS — M26.609 TMJ (TEMPOROMANDIBULAR JOINT SYNDROME): Primary | ICD-10-CM

## 2025-08-11 PROCEDURE — 97530 THERAPEUTIC ACTIVITIES: CPT | Mod: GP

## 2025-08-11 PROCEDURE — 97110 THERAPEUTIC EXERCISES: CPT | Mod: GP

## 2025-08-11 PROCEDURE — 97140 MANUAL THERAPY 1/> REGIONS: CPT | Mod: GP

## 2025-08-18 ENCOUNTER — HOSPITAL ENCOUNTER (OUTPATIENT)
Dept: ULTRASOUND IMAGING | Facility: HOSPITAL | Age: 60
Discharge: HOME | End: 2025-08-18
Attending: STUDENT IN AN ORGANIZED HEALTH CARE EDUCATION/TRAINING PROGRAM | Admitting: RADIOLOGY
Payer: COMMERCIAL

## 2025-08-18 DIAGNOSIS — M79.605 LEFT LEG PAIN: ICD-10-CM

## 2025-08-18 DIAGNOSIS — I87.2 VENOUS INSUFFICIENCY OF LEFT LEG: ICD-10-CM

## 2025-08-18 DIAGNOSIS — Z98.890 STATUS POST LASER ABLATION OF INCOMPETENT VEIN: ICD-10-CM

## 2025-08-18 PROCEDURE — 93971 EXTREMITY STUDY: CPT | Mod: LT

## 2025-08-18 PROCEDURE — 93971 EXTREMITY STUDY: CPT | Mod: 26 | Performed by: RADIOLOGY

## 2025-09-04 ENCOUNTER — THERAPY VISIT (OUTPATIENT)
Dept: PHYSICAL THERAPY | Facility: HOSPITAL | Age: 60
End: 2025-09-04
Attending: PHYSICIAN ASSISTANT
Payer: COMMERCIAL

## 2025-09-04 DIAGNOSIS — M26.609 TMJ (TEMPOROMANDIBULAR JOINT SYNDROME): Primary | ICD-10-CM

## 2025-09-04 PROCEDURE — 97140 MANUAL THERAPY 1/> REGIONS: CPT | Mod: GP

## 2025-09-04 PROCEDURE — 97110 THERAPEUTIC EXERCISES: CPT | Mod: GP

## (undated) DEVICE — NDL-25G 1-1/2" NON-SAFETY

## (undated) DEVICE — BRACE-ANKLE MEDIUM HIGH TOP/LOW PROFILE

## (undated) DEVICE — DRILL BIT-1.7MM CALIBRATED

## (undated) DEVICE — MARKER-SKIN REG

## (undated) DEVICE — DRSG-KERLIX 6 X 6 3/4 FLUFF

## (undated) DEVICE — BLADE-SCALPEL #10

## (undated) DEVICE — IRRIGATION-H2O 1000ML

## (undated) DEVICE — TOWEL-OR DISP 4PKS

## (undated) DEVICE — BIN-MINI, MAXI, MICRO DRIVER BLADES BIN

## (undated) DEVICE — NDL-21G 1-1/2" NON-SAFETY

## (undated) DEVICE — Device

## (undated) DEVICE — DRAPE-EXTREMITY SHEET

## (undated) DEVICE — SUTURE-VICRYL 3-0 SH J416H

## (undated) DEVICE — TUBING-SUCTION 20FT

## (undated) DEVICE — SYRINGE-ASEPTO IRRIGATION

## (undated) DEVICE — BDG-ESMARK 6 INCH X 9 FT

## (undated) DEVICE — IRRIGATION-NACL 1000ML

## (undated) DEVICE — PACK-SET UP-CUSTOM

## (undated) DEVICE — SUTURE-ETHILON 4-0 FS-1 1629H

## (undated) DEVICE — CAUTERY PENCIL

## (undated) DEVICE — COVER-TABLE SHEET

## (undated) DEVICE — APPLICATOR-CHLORAPREP 26ML TINTED CHG 2%+ 70% IPA-SURGICAL

## (undated) DEVICE — GLV-7.0 PROTEXIS PI CLASSIC LF/PF

## (undated) DEVICE — DRSG-ISLAND 4IN X 5IN

## (undated) DEVICE — SENSOR-OXISENSOR II ADULT

## (undated) DEVICE — LIGHT HANDLE COVER

## (undated) DEVICE — LUBRICANT JELLY 2OZ. TUBE

## (undated) DEVICE — GOWN-SURG XL LVL 3 REINFORCED

## (undated) DEVICE — DRSG-XEROFORM 1X8

## (undated) DEVICE — CAUTERY-MEGADYNE TIP

## (undated) DEVICE — SPONGE-LAPAROTOMY PADS 18 X 18

## (undated) DEVICE — SYRINGE-20CC LUER LOCK

## (undated) DEVICE — NDL COUNTER-20-40 CT MAGNET/FOAM BLOCK

## (undated) DEVICE — GLV-6.5 BIOGEL LATEX GEN SURG

## (undated) DEVICE — DRSG-SPONGE STERILE 4 X 4

## (undated) DEVICE — CAUTERY PAD-POLYHESIVE II ADULT

## (undated) DEVICE — CUFF-DISP STERILE 18IN SINGLE BLADDER

## (undated) DEVICE — BLADE-SAW 25.0MM X 9.0MM

## (undated) DEVICE — CANISTER-SUCTION 2000CC

## (undated) DEVICE — DRAPE-C-ARM

## (undated) DEVICE — DRSG-SPONGE X-RAY 4 X 4

## (undated) DEVICE — SUTURE-ETHILON 4-0 FS-2 662G

## (undated) DEVICE — DRSG-ABDOMINAL 5 X 9

## (undated) DEVICE — SCD SLEEVE-KNEE LG

## (undated) DEVICE — GLV-7.0 BIOGEL PF/LF BLUE INDICATOR UNDERGLOVE

## (undated) DEVICE — BDG-COBAN 4 INCH

## (undated) DEVICE — BLADE-SCALPEL #15

## (undated) DEVICE — LABEL-STERILE PREPRINTED FOR OR

## (undated) DEVICE — DRSG-NON ADHERING 3 X 4 TELFA

## (undated) DEVICE — GUIDEWIRE W/TROCAR TIP 1.35MM

## (undated) DEVICE — BDG-CONFORM 3 INCH

## (undated) DEVICE — SUTURE-VICRYL 4-0 SH J315H

## (undated) DEVICE — DRSG-KERLIX ROLL 4.5 X 4.1YD

## (undated) DEVICE — GOWN-SURG XXL LVL 3 REINFORCED

## (undated) DEVICE — DRAPE-STERI 45X60CM #1010

## (undated) RX ORDER — CEFAZOLIN SODIUM 1 G/3ML
INJECTION, POWDER, FOR SOLUTION INTRAMUSCULAR; INTRAVENOUS
Status: DISPENSED
Start: 2017-05-22

## (undated) RX ORDER — FENTANYL CITRATE 50 UG/ML
INJECTION, SOLUTION INTRAMUSCULAR; INTRAVENOUS
Status: DISPENSED
Start: 2017-05-22

## (undated) RX ORDER — PROPOFOL 10 MG/ML
INJECTION, EMULSION INTRAVENOUS
Status: DISPENSED
Start: 2018-10-22

## (undated) RX ORDER — FENTANYL CITRATE 50 UG/ML
INJECTION, SOLUTION INTRAMUSCULAR; INTRAVENOUS
Status: DISPENSED
Start: 2018-10-22

## (undated) RX ORDER — LIDOCAINE HYDROCHLORIDE 10 MG/ML
INJECTION, SOLUTION INFILTRATION; PERINEURAL
Status: DISPENSED
Start: 2023-08-02

## (undated) RX ORDER — ONDANSETRON 2 MG/ML
INJECTION INTRAMUSCULAR; INTRAVENOUS
Status: DISPENSED
Start: 2017-05-22

## (undated) RX ORDER — LIDOCAINE HYDROCHLORIDE 20 MG/ML
INJECTION, SOLUTION EPIDURAL; INFILTRATION; INTRACAUDAL; PERINEURAL
Status: DISPENSED
Start: 2017-05-22

## (undated) RX ORDER — FENTANYL CITRATE 50 UG/ML
INJECTION, SOLUTION INTRAMUSCULAR; INTRAVENOUS
Status: DISPENSED
Start: 2017-10-30

## (undated) RX ORDER — DEXAMETHASONE SODIUM PHOSPHATE 10 MG/ML
INJECTION, SOLUTION INTRAMUSCULAR; INTRAVENOUS
Status: DISPENSED
Start: 2017-05-22

## (undated) RX ORDER — IBUPROFEN 400 MG/1
TABLET, FILM COATED ORAL
Status: DISPENSED
Start: 2023-08-02

## (undated) RX ORDER — PROPOFOL 10 MG/ML
INJECTION, EMULSION INTRAVENOUS
Status: DISPENSED
Start: 2017-05-22

## (undated) RX ORDER — DIAZEPAM 5 MG
TABLET ORAL
Status: DISPENSED
Start: 2023-08-02

## (undated) RX ORDER — ONDANSETRON 2 MG/ML
INJECTION INTRAMUSCULAR; INTRAVENOUS
Status: DISPENSED
Start: 2018-10-22

## (undated) RX ORDER — DEXAMETHASONE SODIUM PHOSPHATE 10 MG/ML
INJECTION, SOLUTION INTRAMUSCULAR; INTRAVENOUS
Status: DISPENSED
Start: 2017-10-30

## (undated) RX ORDER — PROPOFOL 10 MG/ML
INJECTION, EMULSION INTRAVENOUS
Status: DISPENSED
Start: 2017-10-30

## (undated) RX ORDER — ONDANSETRON 2 MG/ML
INJECTION INTRAMUSCULAR; INTRAVENOUS
Status: DISPENSED
Start: 2017-10-30

## (undated) RX ORDER — DEXAMETHASONE SODIUM PHOSPHATE 10 MG/ML
INJECTION, SOLUTION INTRAMUSCULAR; INTRAVENOUS
Status: DISPENSED
Start: 2018-10-22